# Patient Record
Sex: FEMALE | Race: WHITE | Employment: PART TIME | URBAN - METROPOLITAN AREA
[De-identification: names, ages, dates, MRNs, and addresses within clinical notes are randomized per-mention and may not be internally consistent; named-entity substitution may affect disease eponyms.]

---

## 2017-01-16 ENCOUNTER — OFFICE VISIT (OUTPATIENT)
Dept: FAMILY MEDICINE CLINIC | Age: 47
End: 2017-01-16

## 2017-01-16 VITALS
BODY MASS INDEX: 30.3 KG/M2 | DIASTOLIC BLOOD PRESSURE: 74 MMHG | WEIGHT: 171 LBS | OXYGEN SATURATION: 97 % | TEMPERATURE: 97.2 F | HEART RATE: 74 BPM | HEIGHT: 63 IN | SYSTOLIC BLOOD PRESSURE: 128 MMHG | RESPIRATION RATE: 18 BRPM

## 2017-01-16 DIAGNOSIS — F41.9 ANXIETY: ICD-10-CM

## 2017-01-16 DIAGNOSIS — G43.809 OTHER MIGRAINE WITHOUT STATUS MIGRAINOSUS, NOT INTRACTABLE: Primary | ICD-10-CM

## 2017-01-16 DIAGNOSIS — Z12.39 BREAST CANCER SCREENING, HIGH RISK PATIENT: ICD-10-CM

## 2017-01-16 DIAGNOSIS — G43.809 OTHER MIGRAINE WITHOUT STATUS MIGRAINOSUS, NOT INTRACTABLE: ICD-10-CM

## 2017-01-16 LAB
ALBUMIN SERPL-MCNC: 4.7 G/DL (ref 3.9–4.9)
ALP BLD-CCNC: 48 U/L (ref 40–130)
ALT SERPL-CCNC: 28 U/L (ref 0–33)
ANION GAP SERPL CALCULATED.3IONS-SCNC: 12 MEQ/L (ref 7–13)
AST SERPL-CCNC: 23 U/L (ref 0–35)
BASOPHILS ABSOLUTE: 0 K/UL (ref 0–0.2)
BASOPHILS RELATIVE PERCENT: 0.5 %
BILIRUB SERPL-MCNC: 0.3 MG/DL (ref 0–1.2)
BUN BLDV-MCNC: 14 MG/DL (ref 6–20)
CALCIUM SERPL-MCNC: 9.1 MG/DL (ref 8.6–10.2)
CHLORIDE BLD-SCNC: 102 MEQ/L (ref 98–107)
CHOLESTEROL, TOTAL: 191 MG/DL (ref 0–199)
CO2: 25 MEQ/L (ref 22–29)
CREAT SERPL-MCNC: 0.69 MG/DL (ref 0.5–0.9)
EOSINOPHILS ABSOLUTE: 0.2 K/UL (ref 0–0.7)
EOSINOPHILS RELATIVE PERCENT: 2.3 %
GFR AFRICAN AMERICAN: >60
GFR NON-AFRICAN AMERICAN: >60
GLOBULIN: 2.4 G/DL (ref 2.3–3.5)
GLUCOSE BLD-MCNC: 89 MG/DL (ref 74–109)
HCT VFR BLD CALC: 40.6 % (ref 37–47)
HDLC SERPL-MCNC: 77 MG/DL (ref 40–59)
HEMOGLOBIN: 13.5 G/DL (ref 12–16)
LDL CHOLESTEROL CALCULATED: 94 MG/DL (ref 0–129)
LYMPHOCYTES ABSOLUTE: 1.9 K/UL (ref 1–4.8)
LYMPHOCYTES RELATIVE PERCENT: 27.4 %
MCH RBC QN AUTO: 31 PG (ref 27–31.3)
MCHC RBC AUTO-ENTMCNC: 33.4 % (ref 33–37)
MCV RBC AUTO: 92.9 FL (ref 82–100)
MONOCYTES ABSOLUTE: 0.4 K/UL (ref 0.2–0.8)
MONOCYTES RELATIVE PERCENT: 6.4 %
NEUTROPHILS ABSOLUTE: 4.3 K/UL (ref 1.4–6.5)
NEUTROPHILS RELATIVE PERCENT: 63.4 %
PDW BLD-RTO: 13.3 % (ref 11.5–14.5)
PLATELET # BLD: 196 K/UL (ref 130–400)
POTASSIUM SERPL-SCNC: 4.2 MEQ/L (ref 3.5–5.1)
RBC # BLD: 4.37 M/UL (ref 4.2–5.4)
SODIUM BLD-SCNC: 139 MEQ/L (ref 132–144)
TOTAL PROTEIN: 7.1 G/DL (ref 6.4–8.1)
TRIGL SERPL-MCNC: 101 MG/DL (ref 0–200)
WBC # BLD: 6.8 K/UL (ref 4.8–10.8)

## 2017-01-16 PROCEDURE — G8427 DOCREV CUR MEDS BY ELIG CLIN: HCPCS | Performed by: FAMILY MEDICINE

## 2017-01-16 PROCEDURE — G8484 FLU IMMUNIZE NO ADMIN: HCPCS | Performed by: FAMILY MEDICINE

## 2017-01-16 PROCEDURE — 99213 OFFICE O/P EST LOW 20 MIN: CPT | Performed by: FAMILY MEDICINE

## 2017-01-16 PROCEDURE — G8419 CALC BMI OUT NRM PARAM NOF/U: HCPCS | Performed by: FAMILY MEDICINE

## 2017-01-16 PROCEDURE — 1036F TOBACCO NON-USER: CPT | Performed by: FAMILY MEDICINE

## 2017-01-16 RX ORDER — ACYCLOVIR 400 MG/1
400 TABLET ORAL
Qty: 50 TABLET | Refills: 0 | Status: CANCELLED | OUTPATIENT
Start: 2017-01-16 | End: 2017-01-26

## 2017-01-16 RX ORDER — ALPRAZOLAM 1 MG/1
1 TABLET ORAL 2 TIMES DAILY PRN
Qty: 60 TABLET | Refills: 2 | Status: SHIPPED | OUTPATIENT
Start: 2017-01-16 | End: 2017-06-14 | Stop reason: SDUPTHER

## 2017-01-16 RX ORDER — FLUTICASONE PROPIONATE 50 MCG
1 SPRAY, SUSPENSION (ML) NASAL DAILY
Qty: 1 BOTTLE | Refills: 5 | Status: SHIPPED | OUTPATIENT
Start: 2017-01-16

## 2017-01-16 RX ORDER — CEFDINIR 300 MG/1
300 CAPSULE ORAL 2 TIMES DAILY
Qty: 20 CAPSULE | Refills: 0 | Status: SHIPPED | OUTPATIENT
Start: 2017-01-16 | End: 2017-01-26

## 2017-01-16 RX ORDER — OXYCODONE HYDROCHLORIDE AND ACETAMINOPHEN 5; 325 MG/1; MG/1
1 TABLET ORAL EVERY 6 HOURS PRN
Qty: 30 TABLET | Refills: 0 | Status: SHIPPED | OUTPATIENT
Start: 2017-01-16 | End: 2017-03-20 | Stop reason: SDUPTHER

## 2017-01-16 ASSESSMENT — ENCOUNTER SYMPTOMS
RHINORRHEA: 0
COUGH: 0
EYES NEGATIVE: 1
CHEST TIGHTNESS: 0
RESPIRATORY NEGATIVE: 1
GASTROINTESTINAL NEGATIVE: 1

## 2017-01-17 RX ORDER — DEXLANSOPRAZOLE 60 MG/1
CAPSULE, DELAYED RELEASE ORAL
Qty: 30 CAPSULE | Refills: 5 | Status: CANCELLED | OUTPATIENT
Start: 2017-01-17

## 2017-01-17 RX ORDER — LANSOPRAZOLE 30 MG/1
30 CAPSULE, DELAYED RELEASE ORAL DAILY
Qty: 90 CAPSULE | Refills: 3 | Status: SHIPPED | OUTPATIENT
Start: 2017-01-17 | End: 2018-02-27

## 2017-01-20 ENCOUNTER — TELEPHONE (OUTPATIENT)
Dept: FAMILY MEDICINE CLINIC | Age: 47
End: 2017-01-20

## 2017-01-20 RX ORDER — VALACYCLOVIR HYDROCHLORIDE 1 G/1
1000 TABLET, FILM COATED ORAL 3 TIMES DAILY
Qty: 21 TABLET | Refills: 3 | Status: SHIPPED | OUTPATIENT
Start: 2017-01-20 | End: 2017-11-30 | Stop reason: SDUPTHER

## 2017-01-20 RX ORDER — DEXLANSOPRAZOLE 60 MG/1
60 CAPSULE, DELAYED RELEASE ORAL DAILY
Qty: 30 CAPSULE | Refills: 5 | Status: SHIPPED | OUTPATIENT
Start: 2017-01-20 | End: 2018-06-01 | Stop reason: SDUPTHER

## 2017-01-30 ENCOUNTER — HOSPITAL ENCOUNTER (OUTPATIENT)
Dept: WOMENS IMAGING | Age: 47
Discharge: HOME | End: 2017-01-30
Payer: COMMERCIAL

## 2017-01-30 DIAGNOSIS — Z12.39 BREAST CANCER SCREENING, HIGH RISK PATIENT: ICD-10-CM

## 2017-01-30 PROCEDURE — G0202 SCR MAMMO BI INCL CAD: HCPCS

## 2017-03-20 RX ORDER — OXYCODONE HYDROCHLORIDE AND ACETAMINOPHEN 5; 325 MG/1; MG/1
1 TABLET ORAL EVERY 6 HOURS PRN
Qty: 30 TABLET | Refills: 0 | Status: SHIPPED | OUTPATIENT
Start: 2017-03-20 | End: 2017-06-14 | Stop reason: SDUPTHER

## 2017-03-27 ENCOUNTER — TELEPHONE (OUTPATIENT)
Dept: OBGYN | Age: 47
End: 2017-03-27

## 2017-03-27 DIAGNOSIS — N83.201 RIGHT OVARIAN CYST: Primary | ICD-10-CM

## 2017-03-28 ENCOUNTER — TELEPHONE (OUTPATIENT)
Dept: OBGYN | Age: 47
End: 2017-03-28

## 2017-04-03 ENCOUNTER — HOSPITAL ENCOUNTER (OUTPATIENT)
Dept: ULTRASOUND IMAGING | Age: 47
Discharge: HOME | End: 2017-04-03
Payer: COMMERCIAL

## 2017-04-03 DIAGNOSIS — N83.201 RIGHT OVARIAN CYST: ICD-10-CM

## 2017-04-03 PROCEDURE — 76830 TRANSVAGINAL US NON-OB: CPT

## 2017-04-03 PROCEDURE — 76856 US EXAM PELVIC COMPLETE: CPT

## 2017-04-27 ENCOUNTER — OFFICE VISIT (OUTPATIENT)
Dept: OBGYN | Age: 47
End: 2017-04-27

## 2017-04-27 VITALS
HEIGHT: 63 IN | DIASTOLIC BLOOD PRESSURE: 76 MMHG | SYSTOLIC BLOOD PRESSURE: 118 MMHG | BODY MASS INDEX: 29.59 KG/M2 | WEIGHT: 167 LBS

## 2017-04-27 DIAGNOSIS — Z11.51 SPECIAL SCREENING EXAMINATION FOR HUMAN PAPILLOMAVIRUS (HPV): ICD-10-CM

## 2017-04-27 DIAGNOSIS — Z01.419 NORMAL GYNECOLOGIC EXAMINATION: Primary | ICD-10-CM

## 2017-04-27 DIAGNOSIS — Z12.31 SCREENING MAMMOGRAM, ENCOUNTER FOR: ICD-10-CM

## 2017-04-27 PROCEDURE — G8419 CALC BMI OUT NRM PARAM NOF/U: HCPCS | Performed by: OBSTETRICS & GYNECOLOGY

## 2017-04-27 PROCEDURE — 1036F TOBACCO NON-USER: CPT | Performed by: OBSTETRICS & GYNECOLOGY

## 2017-04-27 PROCEDURE — G8427 DOCREV CUR MEDS BY ELIG CLIN: HCPCS | Performed by: OBSTETRICS & GYNECOLOGY

## 2017-04-27 PROCEDURE — 99396 PREV VISIT EST AGE 40-64: CPT | Performed by: OBSTETRICS & GYNECOLOGY

## 2017-05-10 DIAGNOSIS — Z01.419 NORMAL GYNECOLOGIC EXAMINATION: ICD-10-CM

## 2017-05-10 DIAGNOSIS — Z11.51 SPECIAL SCREENING EXAMINATION FOR HUMAN PAPILLOMAVIRUS (HPV): ICD-10-CM

## 2017-05-12 ENCOUNTER — TELEPHONE (OUTPATIENT)
Dept: OBGYN | Age: 47
End: 2017-05-12

## 2017-05-16 DIAGNOSIS — F41.9 ANXIETY: ICD-10-CM

## 2017-05-16 RX ORDER — OXYCODONE HYDROCHLORIDE AND ACETAMINOPHEN 5; 325 MG/1; MG/1
1 TABLET ORAL EVERY 6 HOURS PRN
Qty: 30 TABLET | Refills: 0 | Status: CANCELLED | OUTPATIENT
Start: 2017-05-16

## 2017-05-16 RX ORDER — ALPRAZOLAM 1 MG/1
1 TABLET ORAL 2 TIMES DAILY PRN
Qty: 60 TABLET | Refills: 2 | Status: CANCELLED | OUTPATIENT
Start: 2017-05-16

## 2017-06-14 ENCOUNTER — OFFICE VISIT (OUTPATIENT)
Dept: FAMILY MEDICINE CLINIC | Age: 47
End: 2017-06-14

## 2017-06-14 VITALS
BODY MASS INDEX: 30.65 KG/M2 | WEIGHT: 173 LBS | RESPIRATION RATE: 18 BRPM | HEIGHT: 63 IN | SYSTOLIC BLOOD PRESSURE: 118 MMHG | DIASTOLIC BLOOD PRESSURE: 64 MMHG | TEMPERATURE: 97.2 F | HEART RATE: 74 BPM | OXYGEN SATURATION: 97 %

## 2017-06-14 DIAGNOSIS — G43.809 OTHER TYPE OF MIGRAINE: Primary | ICD-10-CM

## 2017-06-14 DIAGNOSIS — F41.9 ANXIETY: ICD-10-CM

## 2017-06-14 PROCEDURE — 99213 OFFICE O/P EST LOW 20 MIN: CPT | Performed by: FAMILY MEDICINE

## 2017-06-14 PROCEDURE — G8427 DOCREV CUR MEDS BY ELIG CLIN: HCPCS | Performed by: FAMILY MEDICINE

## 2017-06-14 PROCEDURE — G8419 CALC BMI OUT NRM PARAM NOF/U: HCPCS | Performed by: FAMILY MEDICINE

## 2017-06-14 PROCEDURE — 1036F TOBACCO NON-USER: CPT | Performed by: FAMILY MEDICINE

## 2017-06-14 RX ORDER — CITALOPRAM 20 MG/1
20 TABLET ORAL DAILY
Qty: 30 TABLET | Refills: 3 | Status: SHIPPED | OUTPATIENT
Start: 2017-06-14 | End: 2017-09-05

## 2017-06-14 RX ORDER — ALPRAZOLAM 1 MG/1
1 TABLET ORAL 2 TIMES DAILY PRN
Qty: 60 TABLET | Refills: 2 | Status: SHIPPED | OUTPATIENT
Start: 2017-06-14 | End: 2017-10-31 | Stop reason: SDUPTHER

## 2017-06-14 RX ORDER — OXYCODONE HYDROCHLORIDE AND ACETAMINOPHEN 5; 325 MG/1; MG/1
1 TABLET ORAL EVERY 6 HOURS PRN
Qty: 30 TABLET | Refills: 0 | Status: SHIPPED | OUTPATIENT
Start: 2017-06-14 | End: 2017-09-05 | Stop reason: SDUPTHER

## 2017-06-14 ASSESSMENT — ENCOUNTER SYMPTOMS
EYES NEGATIVE: 1
RHINORRHEA: 0
CHEST TIGHTNESS: 0
GASTROINTESTINAL NEGATIVE: 1
RESPIRATORY NEGATIVE: 1
COUGH: 0

## 2017-07-12 RX ORDER — VALACYCLOVIR HYDROCHLORIDE 1 G/1
1000 TABLET, FILM COATED ORAL 3 TIMES DAILY
Qty: 21 TABLET | Refills: 1 | Status: SHIPPED | OUTPATIENT
Start: 2017-07-12 | End: 2017-07-19

## 2017-09-05 ENCOUNTER — TELEPHONE (OUTPATIENT)
Dept: FAMILY MEDICINE CLINIC | Age: 47
End: 2017-09-05

## 2017-09-05 ENCOUNTER — OFFICE VISIT (OUTPATIENT)
Dept: FAMILY MEDICINE CLINIC | Age: 47
End: 2017-09-05

## 2017-09-05 VITALS
DIASTOLIC BLOOD PRESSURE: 84 MMHG | HEART RATE: 74 BPM | OXYGEN SATURATION: 98 % | WEIGHT: 175 LBS | HEIGHT: 63 IN | RESPIRATION RATE: 18 BRPM | SYSTOLIC BLOOD PRESSURE: 130 MMHG | TEMPERATURE: 98.1 F | BODY MASS INDEX: 31.01 KG/M2

## 2017-09-05 DIAGNOSIS — G43.009 NONINTRACTABLE MIGRAINE, UNSPECIFIED MIGRAINE TYPE: ICD-10-CM

## 2017-09-05 DIAGNOSIS — F32.A DEPRESSION, UNSPECIFIED DEPRESSION TYPE: ICD-10-CM

## 2017-09-05 DIAGNOSIS — F41.9 ANXIETY: Primary | ICD-10-CM

## 2017-09-05 PROCEDURE — 99213 OFFICE O/P EST LOW 20 MIN: CPT | Performed by: FAMILY MEDICINE

## 2017-09-05 RX ORDER — BUPROPION HYDROCHLORIDE 150 MG/1
150 TABLET ORAL EVERY MORNING
Qty: 30 TABLET | Refills: 3 | Status: SHIPPED | OUTPATIENT
Start: 2017-09-05 | End: 2017-09-05 | Stop reason: SDUPTHER

## 2017-09-05 RX ORDER — BUPROPION HYDROCHLORIDE 150 MG/1
150 TABLET ORAL EVERY MORNING
Qty: 30 TABLET | Refills: 3 | Status: SHIPPED | OUTPATIENT
Start: 2017-09-05 | End: 2017-10-31 | Stop reason: SDUPTHER

## 2017-09-05 RX ORDER — CELECOXIB 200 MG/1
200 CAPSULE ORAL DAILY
Qty: 60 CAPSULE | Refills: 3 | Status: SHIPPED | OUTPATIENT
Start: 2017-09-05 | End: 2018-02-27

## 2017-09-05 RX ORDER — OXYCODONE HYDROCHLORIDE AND ACETAMINOPHEN 5; 325 MG/1; MG/1
TABLET ORAL
Qty: 28 TABLET | Refills: 0 | Status: SHIPPED | OUTPATIENT
Start: 2017-09-05 | End: 2017-10-31 | Stop reason: SDUPTHER

## 2017-09-05 RX ORDER — CELECOXIB 200 MG/1
200 CAPSULE ORAL DAILY
Qty: 60 CAPSULE | Refills: 3 | Status: SHIPPED | OUTPATIENT
Start: 2017-09-05 | End: 2017-09-05 | Stop reason: SDUPTHER

## 2017-09-05 ASSESSMENT — ENCOUNTER SYMPTOMS
EYES NEGATIVE: 1
RESPIRATORY NEGATIVE: 1
GASTROINTESTINAL NEGATIVE: 1
COUGH: 0
RHINORRHEA: 0
CHEST TIGHTNESS: 0

## 2017-10-31 ENCOUNTER — OFFICE VISIT (OUTPATIENT)
Dept: FAMILY MEDICINE CLINIC | Age: 47
End: 2017-10-31

## 2017-10-31 VITALS
RESPIRATION RATE: 18 BRPM | BODY MASS INDEX: 27.94 KG/M2 | DIASTOLIC BLOOD PRESSURE: 80 MMHG | OXYGEN SATURATION: 97 % | TEMPERATURE: 97.8 F | HEIGHT: 67 IN | HEART RATE: 74 BPM | WEIGHT: 178 LBS | SYSTOLIC BLOOD PRESSURE: 132 MMHG

## 2017-10-31 DIAGNOSIS — G43.809 OTHER MIGRAINE WITHOUT STATUS MIGRAINOSUS, NOT INTRACTABLE: Primary | ICD-10-CM

## 2017-10-31 DIAGNOSIS — B07.9 VIRAL WARTS, UNSPECIFIED TYPE: ICD-10-CM

## 2017-10-31 DIAGNOSIS — F41.9 ANXIETY: ICD-10-CM

## 2017-10-31 PROBLEM — G43.909 MIGRAINE: Status: ACTIVE | Noted: 2017-10-31

## 2017-10-31 PROCEDURE — 17110 DESTRUCTION B9 LES UP TO 14: CPT | Performed by: FAMILY MEDICINE

## 2017-10-31 PROCEDURE — 99213 OFFICE O/P EST LOW 20 MIN: CPT | Performed by: FAMILY MEDICINE

## 2017-10-31 RX ORDER — OXYCODONE HYDROCHLORIDE AND ACETAMINOPHEN 5; 325 MG/1; MG/1
TABLET ORAL
Qty: 28 TABLET | Refills: 0 | Status: SHIPPED | OUTPATIENT
Start: 2017-10-31 | End: 2018-03-07 | Stop reason: SDUPTHER

## 2017-10-31 RX ORDER — ALPRAZOLAM 1 MG/1
1 TABLET ORAL 2 TIMES DAILY PRN
Qty: 60 TABLET | Refills: 2 | Status: SHIPPED | OUTPATIENT
Start: 2017-10-31 | End: 2018-03-07 | Stop reason: SDUPTHER

## 2017-10-31 RX ORDER — BUPROPION HYDROCHLORIDE 300 MG/1
300 TABLET ORAL EVERY MORNING
Qty: 90 TABLET | Refills: 3 | Status: SHIPPED | OUTPATIENT
Start: 2017-10-31 | End: 2018-03-07 | Stop reason: SDUPTHER

## 2017-10-31 ASSESSMENT — ENCOUNTER SYMPTOMS
CHEST TIGHTNESS: 0
EYES NEGATIVE: 1
COUGH: 0
RESPIRATORY NEGATIVE: 1
GASTROINTESTINAL NEGATIVE: 1
RHINORRHEA: 0

## 2017-10-31 NOTE — PROGRESS NOTES
Patient is seen in follow up for   Chief Complaint   Patient presents with    Medication Check     3 mon CSM for xanax & percocet     Skin Lesion     finger rt hand      HPIhere for wart on her finger for a year needs treated and for refills on percocet for headaches one every three days and takes xanax at night for anxiety and sleep. Failed topamax and immitrex. Past Medical History:   Diagnosis Date    Allergic rhinitis     Anxiety     Depression     GERD (gastroesophageal reflux disease)     Herpes simplex virus type 1 (HSV-1) dermatitis     oral only. Patient Active Problem List    Diagnosis Date Noted    Migraine 10/31/2017    Breast mass 07/06/2015    Anxiety     Allergic rhinitis     GERD (gastroesophageal reflux disease)     Depression      Past Surgical History:   Procedure Laterality Date    BREAST LUMPECTOMY Left 12/2014    DILATION AND CURETT[facility]US  09/21/2016     [facility]    TONSILLECTOMY AND ADENOIDECTOMY      UPPER GASTROINTESTINAL ENDOSCOPY  9/9/08    DR Breonna Lozoya    UPPER GASTROINTESTINAL ENDOSCOPY  8/27/10    DR CUMMINS     No family history on file. Social History     Social History    Marital status:      Spouse name: N/A    Number of children: N/A    Years of education: N/A     Social History Main Topics    Smoking status: Former Smoker    Smokeless tobacco: Never Used    Alcohol use 0.0 oz/week      Comment: occ    Drug use: No    Sexual activity: Yes     Partners: Male     Other Topics Concern    None     Social History Narrative    None     Current Outpatient Prescriptions   Medication Sig Dispense Refill    oxyCODONE-acetaminophen (PERCOCET) 5-325 MG per tablet Take one as needed for migraine. . 28 tablet 0    buPROPion (WELLBUTRIN XL) 300 MG extended release tablet Take 1 tablet by mouth every morning 90 tablet 3    ALPRAZolam (XANAX) 1 MG tablet Take 1 tablet by mouth 2 times daily as needed for Sleep or Anxiety 60 tablet 2    celecoxib (CELEBREX) 200 MG capsule Take 1 capsule by mouth daily 60 capsule 3    dexlansoprazole (DEXILANT) 60 MG CPDR delayed release capsule Take 1 capsule by mouth daily 30 capsule 5    lansoprazole (PREVACID) 30 MG delayed release capsule Take 1 capsule by mouth daily 90 capsule 3    fluticasone (FLONASE) 50 MCG/ACT nasal spray 1 spray by Nasal route daily 1 Bottle 5     No current facility-administered medications for this visit. Current Outpatient Prescriptions on File Prior to Visit   Medication Sig Dispense Refill    celecoxib (CELEBREX) 200 MG capsule Take 1 capsule by mouth daily 60 capsule 3    dexlansoprazole (DEXILANT) 60 MG CPDR delayed release capsule Take 1 capsule by mouth daily 30 capsule 5    lansoprazole (PREVACID) 30 MG delayed release capsule Take 1 capsule by mouth daily 90 capsule 3    fluticasone (FLONASE) 50 MCG/ACT nasal spray 1 spray by Nasal route daily 1 Bottle 5     No current facility-administered medications on file prior to visit. Allergies   Allergen Reactions    Doxycycline Other (See Comments)     Heart burn    Prochlorperazine Edisylate      ANXIETY     Health Maintenance   Topic Date Due    HIV screen  02/14/1987    DTaP/Tdap/Td vaccine (1 - Tdap) 02/14/1991    Flu vaccine (1) 09/01/2017    Cervical cancer screen  05/04/2020    Lipid screen  01/16/2022       Review of Systems    Review of Systems   Constitutional: Negative for activity change, appetite change, fatigue and fever. HENT: Negative for congestion and rhinorrhea. Eyes: Negative. Respiratory: Negative. Negative for cough and chest tightness. Cardiovascular: Negative. Gastrointestinal: Negative. Endocrine: Negative. Genitourinary: Negative. Musculoskeletal: Negative. Skin: Negative. Neurological: Positive for headaches. Negative for dizziness, light-headedness and numbness. Hematological: Negative. Psychiatric/Behavioral: Negative.         Physical Exam  Vitals:

## 2017-11-30 ENCOUNTER — TELEPHONE (OUTPATIENT)
Dept: FAMILY MEDICINE CLINIC | Age: 47
End: 2017-11-30

## 2017-11-30 RX ORDER — VALACYCLOVIR HYDROCHLORIDE 1 G/1
1000 TABLET, FILM COATED ORAL 3 TIMES DAILY
Qty: 21 TABLET | Refills: 3 | Status: SHIPPED | OUTPATIENT
Start: 2017-11-30 | End: 2017-12-07

## 2017-11-30 RX ORDER — VALACYCLOVIR HYDROCHLORIDE 1 G/1
1000 TABLET, FILM COATED ORAL 3 TIMES DAILY
Qty: 21 TABLET | Refills: 3 | Status: SHIPPED | OUTPATIENT
Start: 2017-11-30 | End: 2017-11-30 | Stop reason: SDUPTHER

## 2018-02-12 ENCOUNTER — TELEPHONE (OUTPATIENT)
Dept: FAMILY MEDICINE CLINIC | Age: 48
End: 2018-02-12

## 2018-02-12 DIAGNOSIS — Z12.39 BREAST CANCER SCREENING, HIGH RISK PATIENT: Primary | ICD-10-CM

## 2018-02-21 ENCOUNTER — HOSPITAL ENCOUNTER (OUTPATIENT)
Dept: WOMENS IMAGING | Age: 48
Discharge: HOME | End: 2018-02-23
Payer: COMMERCIAL

## 2018-02-21 DIAGNOSIS — Z12.39 BREAST CANCER SCREENING, HIGH RISK PATIENT: ICD-10-CM

## 2018-02-21 PROCEDURE — 77067 SCR MAMMO BI INCL CAD: CPT

## 2018-02-27 ENCOUNTER — OFFICE VISIT (OUTPATIENT)
Dept: OBGYN CLINIC | Age: 48
End: 2018-02-27
Payer: COMMERCIAL

## 2018-02-27 VITALS
HEIGHT: 63 IN | WEIGHT: 168 LBS | DIASTOLIC BLOOD PRESSURE: 82 MMHG | BODY MASS INDEX: 29.77 KG/M2 | SYSTOLIC BLOOD PRESSURE: 124 MMHG

## 2018-02-27 DIAGNOSIS — R92.2 DENSE BREAST: ICD-10-CM

## 2018-02-27 DIAGNOSIS — Z01.419 PAP SMEAR, AS PART OF ROUTINE GYNECOLOGICAL EXAMINATION: Primary | ICD-10-CM

## 2018-02-27 DIAGNOSIS — Z12.31 SCREENING MAMMOGRAM, ENCOUNTER FOR: ICD-10-CM

## 2018-02-27 DIAGNOSIS — Z11.51 SCREENING FOR HUMAN PAPILLOMAVIRUS: ICD-10-CM

## 2018-02-27 DIAGNOSIS — N89.8 VAGINAL DISCHARGE: ICD-10-CM

## 2018-02-27 PROCEDURE — 99396 PREV VISIT EST AGE 40-64: CPT | Performed by: OBSTETRICS & GYNECOLOGY

## 2018-02-27 PROCEDURE — 99213 OFFICE O/P EST LOW 20 MIN: CPT | Performed by: OBSTETRICS & GYNECOLOGY

## 2018-02-27 RX ORDER — VALACYCLOVIR HYDROCHLORIDE 1 G/1
1000 TABLET, FILM COATED ORAL 2 TIMES DAILY
COMMUNITY
End: 2018-12-26 | Stop reason: SDUPTHER

## 2018-02-27 ASSESSMENT — PATIENT HEALTH QUESTIONNAIRE - PHQ9
SUM OF ALL RESPONSES TO PHQ9 QUESTIONS 1 & 2: 1
2. FEELING DOWN, DEPRESSED OR HOPELESS: 1
SUM OF ALL RESPONSES TO PHQ QUESTIONS 1-9: 1
1. LITTLE INTEREST OR PLEASURE IN DOING THINGS: 0

## 2018-02-27 ASSESSMENT — ENCOUNTER SYMPTOMS
BLOOD IN STOOL: 0
EYES NEGATIVE: 1
ABDOMINAL DISTENTION: 0
VOMITING: 0
NAUSEA: 0
CONSTIPATION: 0
ABDOMINAL PAIN: 0
RECTAL PAIN: 0
ALLERGIC/IMMUNOLOGIC NEGATIVE: 1
RESPIRATORY NEGATIVE: 1
ANAL BLEEDING: 0
DIARRHEA: 0

## 2018-02-27 NOTE — PROGRESS NOTES
Subjective:      Patient ID: Sandra Spann is a 57 y.o. female    Annual exam.  No cycles s/p ablation. Pap performed and screening mammogram ordered. STD screening offered. Monthly SBE encouraged. F/U annual or prn. Pt also wished to discuss mid-cycle vaginal discharge and dense breast tissue today at her visit extending her appointment time by 15 minutes. Patient states she gets clear vaginal discharge once a month, occasionally irritating. No bleeding s/p ablation, but c/w mid-cycle ovulatory discharge by hx. Reassured WNL. Also discussed dense breast tissue, mammograms, how mammograms are generally read by comparison and SBE. All questions answered. F/U annual exam or prn. Vitals:  /82   Ht 5' 3\" (1.6 m)   Wt 168 lb (76.2 kg)   BMI 29.76 kg/m²   Past Medical History:   Diagnosis Date    Allergic rhinitis     Anxiety     Depression     GERD (gastroesophageal reflux disease)     Herpes simplex virus type 1 (HSV-1) dermatitis     oral only. Past Surgical History:   Procedure Laterality Date    BREAST LUMPECTOMY Left 12/2014    [facility]21/2016     [facility]    TONSILLECTOMY AND ADENOIDECTOMY      UPPER GASTROINTESTINAL ENDOSCOPY  9/9/08    DR Jo Alba    UPPER GASTROINTESTINAL ENDOSCOPY  8/27/10    DR Jo Alba     Allergies:  Doxycycline and Prochlorperazine edisylate  Current Outpatient Prescriptions   Medication Sig Dispense Refill    valACYclovir (VALTREX) 1 g tablet Take 1,000 mg by mouth 2 times daily      oxyCODONE-acetaminophen (PERCOCET) 5-325 MG per tablet Take one as needed for migraine. . 28 tablet 0    buPROPion (WELLBUTRIN XL) 300 MG extended release tablet Take 1 tablet by mouth every morning 90 tablet 3    ALPRAZolam (XANAX) 1 MG tablet Take 1 tablet by mouth 2 times daily as needed for Sleep or Anxiety 60 tablet 2    dexlansoprazole (DEXILANT) 60 MG CPDR delayed release capsule Take 1 capsule by mouth daily 30 capsule 5    fluticasone (FLONASE) 50 MCG/ACT nasal spray 1 spray by Nasal route daily 1 Bottle 5     No current facility-administered medications for this visit. Social History     Social History    Marital status:      Spouse name: N/A    Number of children: N/A    Years of education: N/A     Occupational History    Not on file. Social History Main Topics    Smoking status: Former Smoker    Smokeless tobacco: Never Used    Alcohol use 0.0 oz/week      Comment: occ    Drug use: No    Sexual activity: Yes     Partners: Male     Other Topics Concern    Not on file     Social History Narrative    No narrative on file     Family History   Problem Relation Age of Onset    No Known Problems Mother     No Known Problems Father     No Known Problems Paternal Grandfather     No Known Problems Paternal Grandmother     No Known Problems Maternal Grandmother     No Known Problems Maternal Grandfather     No Known Problems Brother     No Known Problems Sister     No Known Problems Other     Breast Cancer Neg Hx     Cancer Neg Hx     Colon Cancer Neg Hx     Diabetes Neg Hx     Eclampsia Neg Hx     Hypertension Neg Hx     Ovarian Cancer Neg Hx      Labor Neg Hx     Spont Abortions Neg Hx     Stroke Neg Hx        Review of Systems   Constitutional: Negative. Negative for activity change, appetite change, chills, diaphoresis, fatigue, fever and unexpected weight change. HENT: Negative. Eyes: Negative. Respiratory: Negative. Cardiovascular: Negative. Gastrointestinal: Negative for abdominal distention, abdominal pain, anal bleeding, blood in stool, constipation, diarrhea, nausea, rectal pain and vomiting. Endocrine: Negative. Genitourinary: Positive for vaginal discharge (Mid-cycle clear).  Negative for decreased urine volume, difficulty urinating, dyspareunia, dysuria, enuresis, flank pain, frequency, genital sores, hematuria, menstrual problem, pelvic pain, urgency, vaginal bleeding and vaginal pain. Musculoskeletal: Negative. Skin: Negative. Allergic/Immunologic: Negative. Neurological: Negative. Hematological: Negative. Psychiatric/Behavioral: Negative. Objective:     Physical Exam   Constitutional: She is oriented to person, place, and time. She appears well-developed and well-nourished. HENT:   Head: Normocephalic. Neck: Normal range of motion. Neck supple. No thyromegaly present. Cardiovascular: Normal rate, regular rhythm and normal heart sounds. Pulmonary/Chest: Effort normal and breath sounds normal. No respiratory distress. She has no wheezes. She has no rales. She exhibits no tenderness. Abdominal: Soft. Bowel sounds are normal. She exhibits no distension and no mass. There is no tenderness. There is no rebound and no guarding. Hernia confirmed negative in the right inguinal area and confirmed negative in the left inguinal area. Genitourinary: Rectum normal, vagina normal and uterus normal. No breast swelling, tenderness, discharge or bleeding. No labial fusion. There is no rash, tenderness, lesion or injury on the right labia. There is no rash, tenderness, lesion or injury on the left labia. Uterus is not deviated, not enlarged, not fixed and not tender. Cervix exhibits no motion tenderness, no discharge and no friability. Right adnexum displays no mass, no tenderness and no fullness. Left adnexum displays no mass, no tenderness and no fullness. No erythema, tenderness or bleeding in the vagina. No foreign body in the vagina. No signs of injury around the vagina. No vaginal discharge found. Musculoskeletal: Normal range of motion. She exhibits no edema or tenderness. Lymphadenopathy:     She has no cervical adenopathy. Right: No inguinal adenopathy present. Left: No inguinal adenopathy present. Neurological: She is alert and oriented to person, place, and time. Skin: Skin is warm and dry. No rash noted.  No

## 2018-03-05 DIAGNOSIS — Z11.51 SCREENING FOR HUMAN PAPILLOMAVIRUS: ICD-10-CM

## 2018-03-05 DIAGNOSIS — Z01.419 PAP SMEAR, AS PART OF ROUTINE GYNECOLOGICAL EXAMINATION: ICD-10-CM

## 2018-03-07 ENCOUNTER — OFFICE VISIT (OUTPATIENT)
Dept: FAMILY MEDICINE CLINIC | Age: 48
End: 2018-03-07
Payer: COMMERCIAL

## 2018-03-07 VITALS
HEIGHT: 67 IN | OXYGEN SATURATION: 97 % | BODY MASS INDEX: 26.37 KG/M2 | HEART RATE: 74 BPM | DIASTOLIC BLOOD PRESSURE: 78 MMHG | WEIGHT: 168 LBS | TEMPERATURE: 98.1 F | RESPIRATION RATE: 18 BRPM | SYSTOLIC BLOOD PRESSURE: 128 MMHG

## 2018-03-07 DIAGNOSIS — Z00.00 PHYSICAL EXAM, ANNUAL: Primary | ICD-10-CM

## 2018-03-07 DIAGNOSIS — B96.89 ACUTE BACTERIAL SINUSITIS: ICD-10-CM

## 2018-03-07 DIAGNOSIS — F41.9 ANXIETY: ICD-10-CM

## 2018-03-07 DIAGNOSIS — Z00.00 PHYSICAL EXAM, ANNUAL: ICD-10-CM

## 2018-03-07 DIAGNOSIS — G43.809 OTHER MIGRAINE WITHOUT STATUS MIGRAINOSUS, NOT INTRACTABLE: ICD-10-CM

## 2018-03-07 DIAGNOSIS — J01.90 ACUTE BACTERIAL SINUSITIS: ICD-10-CM

## 2018-03-07 LAB
ALBUMIN SERPL-MCNC: 4.5 G/DL (ref 3.9–4.9)
ALP BLD-CCNC: 64 U/L (ref 40–130)
ALT SERPL-CCNC: 14 U/L (ref 0–33)
ANION GAP SERPL CALCULATED.3IONS-SCNC: 13 MEQ/L (ref 7–13)
AST SERPL-CCNC: 16 U/L (ref 0–35)
BASOPHILS ABSOLUTE: 0 K/UL (ref 0–0.2)
BASOPHILS RELATIVE PERCENT: 0.5 %
BILIRUB SERPL-MCNC: 0.4 MG/DL (ref 0–1.2)
BUN BLDV-MCNC: 16 MG/DL (ref 6–20)
CALCIUM SERPL-MCNC: 9.3 MG/DL (ref 8.6–10.2)
CHLORIDE BLD-SCNC: 99 MEQ/L (ref 98–107)
CHOLESTEROL, TOTAL: 178 MG/DL (ref 0–199)
CO2: 27 MEQ/L (ref 22–29)
CREAT SERPL-MCNC: 0.71 MG/DL (ref 0.5–0.9)
EOSINOPHILS ABSOLUTE: 0.1 K/UL (ref 0–0.7)
EOSINOPHILS RELATIVE PERCENT: 2.1 %
GFR AFRICAN AMERICAN: >60
GFR NON-AFRICAN AMERICAN: >60
GLOBULIN: 2.8 G/DL (ref 2.3–3.5)
GLUCOSE BLD-MCNC: 92 MG/DL (ref 74–109)
HCT VFR BLD CALC: 42.5 % (ref 37–47)
HDLC SERPL-MCNC: 64 MG/DL (ref 40–59)
HEMOGLOBIN: 14.1 G/DL (ref 12–16)
LDL CHOLESTEROL CALCULATED: 102 MG/DL (ref 0–129)
LYMPHOCYTES ABSOLUTE: 1.6 K/UL (ref 1–4.8)
LYMPHOCYTES RELATIVE PERCENT: 24.2 %
MCH RBC QN AUTO: 31.4 PG (ref 27–31.3)
MCHC RBC AUTO-ENTMCNC: 33.3 % (ref 33–37)
MCV RBC AUTO: 94.4 FL (ref 82–100)
MONOCYTES ABSOLUTE: 0.5 K/UL (ref 0.2–0.8)
MONOCYTES RELATIVE PERCENT: 7 %
NEUTROPHILS ABSOLUTE: 4.3 K/UL (ref 1.4–6.5)
NEUTROPHILS RELATIVE PERCENT: 66.2 %
PDW BLD-RTO: 13.8 % (ref 11.5–14.5)
PLATELET # BLD: 261 K/UL (ref 130–400)
POTASSIUM SERPL-SCNC: 4.4 MEQ/L (ref 3.5–5.1)
RBC # BLD: 4.5 M/UL (ref 4.2–5.4)
SODIUM BLD-SCNC: 139 MEQ/L (ref 132–144)
TOTAL PROTEIN: 7.3 G/DL (ref 6.4–8.1)
TRIGL SERPL-MCNC: 58 MG/DL (ref 0–200)
WBC # BLD: 6.5 K/UL (ref 4.8–10.8)

## 2018-03-07 PROCEDURE — 99396 PREV VISIT EST AGE 40-64: CPT | Performed by: FAMILY MEDICINE

## 2018-03-07 RX ORDER — ALPRAZOLAM 1 MG/1
1 TABLET ORAL 2 TIMES DAILY PRN
Qty: 60 TABLET | Refills: 2 | Status: SHIPPED | OUTPATIENT
Start: 2018-03-07 | End: 2018-10-15 | Stop reason: SDUPTHER

## 2018-03-07 RX ORDER — CEFDINIR 300 MG/1
300 CAPSULE ORAL 2 TIMES DAILY
Qty: 20 CAPSULE | Refills: 0 | Status: SHIPPED | OUTPATIENT
Start: 2018-03-07 | End: 2018-03-07 | Stop reason: CLARIF

## 2018-03-07 RX ORDER — BUPROPION HYDROCHLORIDE 300 MG/1
300 TABLET ORAL EVERY MORNING
Qty: 90 TABLET | Refills: 3 | Status: SHIPPED | OUTPATIENT
Start: 2018-03-07 | End: 2019-04-28 | Stop reason: SDUPTHER

## 2018-03-07 RX ORDER — OXYCODONE HYDROCHLORIDE AND ACETAMINOPHEN 5; 325 MG/1; MG/1
TABLET ORAL
Qty: 28 TABLET | Refills: 0 | Status: SHIPPED | OUTPATIENT
Start: 2018-03-07 | End: 2018-07-18 | Stop reason: SDUPTHER

## 2018-03-07 RX ORDER — CEFDINIR 300 MG/1
300 CAPSULE ORAL 2 TIMES DAILY
Qty: 20 CAPSULE | Refills: 0 | Status: SHIPPED | OUTPATIENT
Start: 2018-03-07 | End: 2018-03-17

## 2018-03-07 ASSESSMENT — ENCOUNTER SYMPTOMS
CHEST TIGHTNESS: 0
RHINORRHEA: 0
GASTROINTESTINAL NEGATIVE: 1
COUGH: 1
EYES NEGATIVE: 1
SINUS PRESSURE: 1
SORE THROAT: 1

## 2018-03-07 NOTE — PROGRESS NOTES
Used    Alcohol use 0.0 oz/week      Comment: occ    Drug use: No    Sexual activity: Yes     Partners: Male     Other Topics Concern    None     Social History Narrative    None     Current Outpatient Prescriptions   Medication Sig Dispense Refill    cefdinir (OMNICEF) 300 MG capsule Take 1 capsule by mouth 2 times daily for 10 days 20 capsule 0    ALPRAZolam (XANAX) 1 MG tablet Take 1 tablet by mouth 2 times daily as needed for Sleep or Anxiety for up to 30 days. 60 tablet 2    buPROPion (WELLBUTRIN XL) 300 MG extended release tablet Take 1 tablet by mouth every morning 90 tablet 3    oxyCODONE-acetaminophen (PERCOCET) 5-325 MG per tablet Take one as needed for migraine. . 28 tablet 0    valACYclovir (VALTREX) 1 g tablet Take 1,000 mg by mouth 2 times daily      dexlansoprazole (DEXILANT) 60 MG CPDR delayed release capsule Take 1 capsule by mouth daily 30 capsule 5    fluticasone (FLONASE) 50 MCG/ACT nasal spray 1 spray by Nasal route daily 1 Bottle 5     No current facility-administered medications for this visit. Current Outpatient Prescriptions on File Prior to Visit   Medication Sig Dispense Refill    valACYclovir (VALTREX) 1 g tablet Take 1,000 mg by mouth 2 times daily      dexlansoprazole (DEXILANT) 60 MG CPDR delayed release capsule Take 1 capsule by mouth daily 30 capsule 5    fluticasone (FLONASE) 50 MCG/ACT nasal spray 1 spray by Nasal route daily 1 Bottle 5     No current facility-administered medications on file prior to visit.       Allergies   Allergen Reactions    Doxycycline Other (See Comments)     Heart burn    Prochlorperazine Edisylate      ANXIETY     Health Maintenance   Topic Date Due    HIV screen  02/14/1987    DTaP/Tdap/Td vaccine (1 - Tdap) 02/14/1991    Cervical cancer screen  03/01/2021    Lipid screen  01/16/2022    Flu vaccine  Completed       Review of Systems    Review of Systems   Constitutional: Negative for activity change, appetite change, fatigue and

## 2018-06-01 RX ORDER — DEXLANSOPRAZOLE 60 MG/1
60 CAPSULE, DELAYED RELEASE ORAL DAILY
Qty: 30 CAPSULE | Refills: 5 | Status: SHIPPED | OUTPATIENT
Start: 2018-06-01 | End: 2018-10-15 | Stop reason: SDUPTHER

## 2018-07-18 ENCOUNTER — OFFICE VISIT (OUTPATIENT)
Dept: INTERNAL MEDICINE CLINIC | Age: 48
End: 2018-07-18
Payer: COMMERCIAL

## 2018-07-18 VITALS
HEART RATE: 78 BPM | OXYGEN SATURATION: 98 % | DIASTOLIC BLOOD PRESSURE: 64 MMHG | WEIGHT: 170.5 LBS | TEMPERATURE: 98.5 F | SYSTOLIC BLOOD PRESSURE: 108 MMHG | BODY MASS INDEX: 26.76 KG/M2 | RESPIRATION RATE: 16 BRPM | HEIGHT: 67 IN

## 2018-07-18 DIAGNOSIS — Z79.899 LONG-TERM USE OF HIGH-RISK MEDICATION: ICD-10-CM

## 2018-07-18 DIAGNOSIS — G43.809 OTHER MIGRAINE WITHOUT STATUS MIGRAINOSUS, NOT INTRACTABLE: Primary | ICD-10-CM

## 2018-07-18 PROCEDURE — 99213 OFFICE O/P EST LOW 20 MIN: CPT | Performed by: NURSE PRACTITIONER

## 2018-07-18 RX ORDER — OXYCODONE HYDROCHLORIDE AND ACETAMINOPHEN 5; 325 MG/1; MG/1
1 TABLET ORAL DAILY PRN
Qty: 30 TABLET | Refills: 0 | Status: SHIPPED | OUTPATIENT
Start: 2018-07-18 | End: 2018-10-15 | Stop reason: SDUPTHER

## 2018-07-18 RX ORDER — ALPRAZOLAM 1 MG/1
TABLET ORAL
Refills: 2 | COMMUNITY
Start: 2018-05-16 | End: 2018-12-15 | Stop reason: SDUPTHER

## 2018-07-18 RX ORDER — BUTALBITAL, ACETAMINOPHEN AND CAFFEINE 50; 325; 40 MG/1; MG/1; MG/1
1 TABLET ORAL EVERY 4 HOURS PRN
Qty: 60 TABLET | Refills: 0 | Status: SHIPPED | OUTPATIENT
Start: 2018-07-18 | End: 2018-10-15 | Stop reason: ALTCHOICE

## 2018-07-18 ASSESSMENT — ENCOUNTER SYMPTOMS
VISUAL CHANGE: 0
BLURRED VISION: 0
WHEEZING: 0
SINUS PRESSURE: 0
EYE ITCHING: 0
EYE WATERING: 0
SORE THROAT: 0
VOMITING: 0
SWOLLEN GLANDS: 0
FACIAL SWEATING: 0
DIARRHEA: 0
EYE REDNESS: 0
EYE DISCHARGE: 0
CHEST TIGHTNESS: 0
COUGH: 0
RHINORRHEA: 0
SCALP TENDERNESS: 0
CONSTIPATION: 0
ABDOMINAL PAIN: 0
BACK PAIN: 0
EYE PAIN: 0
SHORTNESS OF BREATH: 0
PHOTOPHOBIA: 1
NAUSEA: 1

## 2018-07-21 LAB
6-ACETYLMORPHINE: NOT DETECTED
7-AMINOCLONAZEPAM: NOT DETECTED
ALPHA-OH-ALPRAZOLAM: NOT DETECTED
ALPRAZOLAM: NOT DETECTED
AMPHETAMINE: NOT DETECTED
BARBITURATES: NOT DETECTED
BENZOYLECGONINE: NOT DETECTED
BUPRENORPHINE: NOT DETECTED
CARISOPRODOL: NOT DETECTED
CLONAZEPAM: NOT DETECTED
CODEINE: NOT DETECTED
CREATININE URINE: 86.4 MG/DL (ref 20–400)
DIAZEPAM: NOT DETECTED
EER PAIN MGT DRUG PANEL, HIGH RES/EMIT U: NORMAL
ETHYL GLUCURONIDE: PRESENT
FENTANYL: NOT DETECTED
HYDROCODONE: NOT DETECTED
HYDROMORPHONE: NOT DETECTED
LORAZEPAM: NOT DETECTED
MARIJUANA METABOLITE: NOT DETECTED
MDA: NOT DETECTED
MDEA: NOT DETECTED
MDMA URINE: NOT DETECTED
MEPERIDINE: NOT DETECTED
METHADONE: NOT DETECTED
METHAMPHETAMINE: NOT DETECTED
METHYLPHENIDATE: NOT DETECTED
MIDAZOLAM: NOT DETECTED
MORPHINE: NOT DETECTED
NORBUPRENORPHINE, FREE: NOT DETECTED
NORDIAZEPAM: NOT DETECTED
NORFENTANYL: NOT DETECTED
NORHYDROCODONE, URINE: NOT DETECTED
NOROXYCODONE: NOT DETECTED
NOROXYMORPHONE, URINE: NOT DETECTED
OXAZEPAM: NOT DETECTED
OXYCODONE: NOT DETECTED
OXYMORPHONE: NOT DETECTED
PAIN MANAGEMENT DRUG PANEL: NORMAL
PCP: NOT DETECTED
PHENTERMINE: NOT DETECTED
PROPOXYPHENE: NOT DETECTED
TAPENTADOL, URINE: NOT DETECTED
TAPENTADOL-O-SULFATE, URINE: NOT DETECTED
TEMAZEPAM: NOT DETECTED
TRAMADOL: NOT DETECTED
ZOLPIDEM: NOT DETECTED

## 2018-07-30 ENCOUNTER — TELEPHONE (OUTPATIENT)
Dept: INTERNAL MEDICINE CLINIC | Age: 48
End: 2018-07-30

## 2018-10-12 ENCOUNTER — NURSE ONLY (OUTPATIENT)
Dept: FAMILY MEDICINE CLINIC | Age: 48
End: 2018-10-12
Payer: COMMERCIAL

## 2018-10-12 DIAGNOSIS — Z23 NEED FOR INFLUENZA VACCINATION: Primary | ICD-10-CM

## 2018-10-12 PROCEDURE — 90688 IIV4 VACCINE SPLT 0.5 ML IM: CPT | Performed by: FAMILY MEDICINE

## 2018-10-12 PROCEDURE — 90471 IMMUNIZATION ADMIN: CPT | Performed by: FAMILY MEDICINE

## 2018-10-15 ENCOUNTER — OFFICE VISIT (OUTPATIENT)
Dept: INTERNAL MEDICINE CLINIC | Age: 48
End: 2018-10-15
Payer: COMMERCIAL

## 2018-10-15 VITALS
TEMPERATURE: 98.4 F | WEIGHT: 169 LBS | BODY MASS INDEX: 29.95 KG/M2 | HEART RATE: 77 BPM | RESPIRATION RATE: 14 BRPM | HEIGHT: 63 IN | OXYGEN SATURATION: 98 % | DIASTOLIC BLOOD PRESSURE: 70 MMHG | SYSTOLIC BLOOD PRESSURE: 112 MMHG

## 2018-10-15 DIAGNOSIS — G43.809 OTHER MIGRAINE WITHOUT STATUS MIGRAINOSUS, NOT INTRACTABLE: ICD-10-CM

## 2018-10-15 DIAGNOSIS — F41.9 ANXIETY: ICD-10-CM

## 2018-10-15 PROCEDURE — 99213 OFFICE O/P EST LOW 20 MIN: CPT | Performed by: FAMILY MEDICINE

## 2018-10-15 RX ORDER — TOPIRAMATE 50 MG/1
100 TABLET, FILM COATED ORAL 2 TIMES DAILY
Refills: 3 | COMMUNITY
Start: 2018-10-04

## 2018-10-15 RX ORDER — TRAZODONE HYDROCHLORIDE 50 MG/1
TABLET ORAL NIGHTLY
Refills: 3 | COMMUNITY
Start: 2018-10-04

## 2018-10-15 RX ORDER — ALPRAZOLAM 1 MG/1
1 TABLET ORAL 2 TIMES DAILY PRN
Qty: 60 TABLET | Refills: 2 | Status: SHIPPED | OUTPATIENT
Start: 2018-10-15 | End: 2018-11-14

## 2018-10-15 RX ORDER — DEXLANSOPRAZOLE 60 MG/1
60 CAPSULE, DELAYED RELEASE ORAL DAILY
Qty: 30 CAPSULE | Refills: 5 | Status: SHIPPED | OUTPATIENT
Start: 2018-10-15 | End: 2019-01-17 | Stop reason: SDUPTHER

## 2018-10-15 RX ORDER — OXYCODONE HYDROCHLORIDE AND ACETAMINOPHEN 5; 325 MG/1; MG/1
1 TABLET ORAL DAILY PRN
Qty: 30 TABLET | Refills: 0 | Status: SHIPPED | OUTPATIENT
Start: 2018-10-15 | End: 2019-03-14 | Stop reason: SDUPTHER

## 2018-10-15 RX ORDER — SULFAMETHOXAZOLE AND TRIMETHOPRIM 800; 160 MG/1; MG/1
TABLET ORAL
Refills: 1 | COMMUNITY
Start: 2018-10-04 | End: 2020-03-09 | Stop reason: CLARIF

## 2018-10-15 NOTE — PROGRESS NOTES
Patient is seen in follow up for   Chief Complaint   Patient presents with    Medication Check     3 month follow up on CSM, percocet and xanax refill. Contract - 10/31/17 tox screen 18.  Health Maintenance     declines HIV screen     Migraine     States Fioricet didn't help      HPIhere for follow u op on migraines seeing Neurology. Past Medical History:   Diagnosis Date    Allergic rhinitis     Anxiety     Depression     GERD (gastroesophageal reflux disease)     Herpes simplex virus type 1 (HSV-1) dermatitis     oral only.      Patient Active Problem List    Diagnosis Date Noted    Migraine 10/31/2017    Breast mass 2015    Anxiety     Allergic rhinitis     GERD (gastroesophageal reflux disease)     Depression      Past Surgical History:   Procedure Laterality Date    BREAST LUMPECTOMY Left 2014    DIL[facility]URETT[facility]TERUS  2016     [facility]    TONSILLECTOMY AND ADENOIDECTOMY      UPPER GASTROINTESTINAL ENDOSCOPY  08    DR Tiffany Jackson    UPPER GASTROINTESTINAL ENDOSCOPY  8/27/10    DR Tiffany Jackson     Family History   Problem Relation Age of Onset    No Known Problems Mother     No Known Problems Father     No Known Problems Paternal Grandfather     No Known Problems Paternal Grandmother     No Known Problems Maternal Grandmother     No Known Problems Maternal Grandfather     No Known Problems Brother     No Known Problems Sister     No Known Problems Other     Breast Cancer Neg Hx     Cancer Neg Hx     Colon Cancer Neg Hx     Diabetes Neg Hx     Eclampsia Neg Hx     Hypertension Neg Hx     Ovarian Cancer Neg Hx      Labor Neg Hx     Spont Abortions Neg Hx     Stroke Neg Hx      Social History     Social History    Marital status:      Spouse name: N/A    Number of children: N/A    Years of education: N/A     Social History Main Topics    Smoking status: Former Smoker    Smokeless tobacco: Never Used    Alcohol use 0.0 oz/week Comment: occ    Drug use: No    Sexual activity: Yes     Partners: Male     Other Topics Concern    None     Social History Narrative    None     Current Outpatient Prescriptions   Medication Sig Dispense Refill    traZODone (DESYREL) 50 MG tablet TK 1 T PO QHS  3    topiramate (TOPAMAX) 50 MG tablet TK 1 T PO QHS  3    sulfamethoxazole-trimethoprim (BACTRIM DS;SEPTRA DS) 800-160 MG per tablet TK 1 T PO BID  1    oxyCODONE-acetaminophen (PERCOCET) 5-325 MG per tablet Take 1 tablet by mouth daily as needed (migraine) for up to 30 days. Take one as needed for migraine. Aftab Filler Date: 10/15/18 30 tablet 0    ALPRAZolam (XANAX) 1 MG tablet Take 1 tablet by mouth 2 times daily as needed for Sleep or Anxiety for up to 30 days. . 60 tablet 2    dexlansoprazole (DEXILANT) 60 MG CPDR delayed release capsule Take 1 capsule by mouth daily 30 capsule 5    ALPRAZolam (XANAX) 1 MG tablet TAKE ONE TABLET BY MOUTH TWICE DAILY AS NEEDED FOR SLEEP OR ANXIETY FOR UP TO 30 DAYS  2    buPROPion (WELLBUTRIN XL) 300 MG extended release tablet Take 1 tablet by mouth every morning 90 tablet 3    valACYclovir (VALTREX) 1 g tablet Take 1,000 mg by mouth 2 times daily      fluticasone (FLONASE) 50 MCG/ACT nasal spray 1 spray by Nasal route daily 1 Bottle 5     No current facility-administered medications for this visit. Current Outpatient Prescriptions on File Prior to Visit   Medication Sig Dispense Refill    ALPRAZolam (XANAX) 1 MG tablet TAKE ONE TABLET BY MOUTH TWICE DAILY AS NEEDED FOR SLEEP OR ANXIETY FOR UP TO 30 DAYS  2    buPROPion (WELLBUTRIN XL) 300 MG extended release tablet Take 1 tablet by mouth every morning 90 tablet 3    valACYclovir (VALTREX) 1 g tablet Take 1,000 mg by mouth 2 times daily      fluticasone (FLONASE) 50 MCG/ACT nasal spray 1 spray by Nasal route daily 1 Bottle 5     No current facility-administered medications on file prior to visit.       Allergies   Allergen Reactions    Musculoskeletal: Normal range of motion. Lymphadenopathy:     She has no cervical adenopathy. Neurological: She is alert and oriented to person, place, and time. No cranial nerve deficit. Coordination normal.   Skin: Skin is warm and dry. Psychiatric: She has a normal mood and affect. Assessment   Diagnosis Orders   1. Other migraine without status migrainosus, not intractable  oxyCODONE-acetaminophen (PERCOCET) 5-325 MG per tablet   2. Anxiety  ALPRAZolam (XANAX) 1 MG tablet     Problem List     Anxiety    Relevant Medications    buPROPion (WELLBUTRIN XL) 300 MG extended release tablet    ALPRAZolam (XANAX) 1 MG tablet    traZODone (DESYREL) 50 MG tablet    ALPRAZolam (XANAX) 1 MG tablet    Migraine    Relevant Medications    buPROPion (WELLBUTRIN XL) 300 MG extended release tablet    traZODone (DESYREL) 50 MG tablet    topiramate (TOPAMAX) 50 MG tablet    oxyCODONE-acetaminophen (PERCOCET) 5-325 MG per tablet          Plan  No orders of the defined types were placed in this encounter. Orders Placed This Encounter   Medications    oxyCODONE-acetaminophen (PERCOCET) 5-325 MG per tablet     Sig: Take 1 tablet by mouth daily as needed (migraine) for up to 30 days. Take one as needed for migraine. Alisha Page Date: 10/15/18     Dispense:  30 tablet     Refill:  0    ALPRAZolam (XANAX) 1 MG tablet     Sig: Take 1 tablet by mouth 2 times daily as needed for Sleep or Anxiety for up to 30 days. .     Dispense:  60 tablet     Refill:  2    dexlansoprazole (DEXILANT) 60 MG CPDR delayed release capsule     Sig: Take 1 capsule by mouth daily     Dispense:  30 capsule     Refill:  5     Controlled Substances Monitoring:     RX Monitoring 10/15/2018   Attestation The Prescription Monitoring Report for this patient was reviewed today. Documentation Possible medication side effects, risk of tolerance/dependence & alternative treatments discussed. ;No signs of potential drug abuse or diversion identified.

## 2018-12-15 DIAGNOSIS — F41.9 ANXIETY: Primary | ICD-10-CM

## 2018-12-15 RX ORDER — VALACYCLOVIR HYDROCHLORIDE 1 G/1
TABLET, FILM COATED ORAL
Qty: 21 TABLET | Refills: 1 | Status: CANCELLED | OUTPATIENT
Start: 2018-12-15

## 2018-12-18 RX ORDER — ALPRAZOLAM 1 MG/1
TABLET ORAL
Qty: 60 TABLET | Refills: 1 | Status: SHIPPED | OUTPATIENT
Start: 2018-12-18 | End: 2019-03-14 | Stop reason: SDUPTHER

## 2018-12-24 NOTE — TELEPHONE ENCOUNTER
Patient called for a refill on cold sore treatment Rx. could not locate on med.  list    [facility], Mario Ballard
rx sent
good minus

## 2018-12-26 RX ORDER — VALACYCLOVIR HYDROCHLORIDE 1 G/1
1000 TABLET, FILM COATED ORAL 3 TIMES DAILY
Qty: 21 TABLET | Refills: 3 | Status: SHIPPED | OUTPATIENT
Start: 2018-12-26 | End: 2019-01-02

## 2019-01-17 RX ORDER — DEXLANSOPRAZOLE 60 MG/1
60 CAPSULE, DELAYED RELEASE ORAL DAILY
Qty: 90 CAPSULE | Refills: 1 | Status: SHIPPED | OUTPATIENT
Start: 2019-01-17 | End: 2019-07-30 | Stop reason: SDUPTHER

## 2019-03-05 ENCOUNTER — OFFICE VISIT (OUTPATIENT)
Dept: OBGYN CLINIC | Age: 49
End: 2019-03-05
Payer: COMMERCIAL

## 2019-03-05 VITALS
DIASTOLIC BLOOD PRESSURE: 66 MMHG | WEIGHT: 155 LBS | BODY MASS INDEX: 27.46 KG/M2 | HEIGHT: 63 IN | SYSTOLIC BLOOD PRESSURE: 104 MMHG

## 2019-03-05 DIAGNOSIS — Z11.51 SCREENING FOR HUMAN PAPILLOMAVIRUS: ICD-10-CM

## 2019-03-05 DIAGNOSIS — Z01.419 WOMEN'S ANNUAL ROUTINE GYNECOLOGICAL EXAMINATION: Primary | ICD-10-CM

## 2019-03-05 DIAGNOSIS — Z12.31 SCREENING MAMMOGRAM, ENCOUNTER FOR: ICD-10-CM

## 2019-03-05 PROCEDURE — 99396 PREV VISIT EST AGE 40-64: CPT | Performed by: OBSTETRICS & GYNECOLOGY

## 2019-03-05 RX ORDER — METHYLPHENIDATE HYDROCHLORIDE 10 MG/1
TABLET ORAL
Refills: 0 | COMMUNITY
Start: 2019-02-20 | End: 2020-03-09 | Stop reason: CLARIF

## 2019-03-05 ASSESSMENT — PATIENT HEALTH QUESTIONNAIRE - PHQ9
SUM OF ALL RESPONSES TO PHQ QUESTIONS 1-9: 0
1. LITTLE INTEREST OR PLEASURE IN DOING THINGS: 0
2. FEELING DOWN, DEPRESSED OR HOPELESS: 0
SUM OF ALL RESPONSES TO PHQ QUESTIONS 1-9: 0
SUM OF ALL RESPONSES TO PHQ9 QUESTIONS 1 & 2: 0

## 2019-03-05 ASSESSMENT — ENCOUNTER SYMPTOMS
BLOOD IN STOOL: 0
RESPIRATORY NEGATIVE: 1
CONSTIPATION: 0
ABDOMINAL PAIN: 0
ANAL BLEEDING: 0
NAUSEA: 0
RECTAL PAIN: 0
VOMITING: 0
ABDOMINAL DISTENTION: 0
DIARRHEA: 0
EYES NEGATIVE: 1
ALLERGIC/IMMUNOLOGIC NEGATIVE: 1

## 2019-03-13 DIAGNOSIS — Z11.51 SCREENING FOR HUMAN PAPILLOMAVIRUS: ICD-10-CM

## 2019-03-13 DIAGNOSIS — Z01.419 WOMEN'S ANNUAL ROUTINE GYNECOLOGICAL EXAMINATION: ICD-10-CM

## 2019-03-14 ENCOUNTER — OFFICE VISIT (OUTPATIENT)
Dept: INTERNAL MEDICINE CLINIC | Age: 49
End: 2019-03-14
Payer: COMMERCIAL

## 2019-03-14 ENCOUNTER — TELEPHONE (OUTPATIENT)
Dept: FAMILY MEDICINE CLINIC | Age: 49
End: 2019-03-14

## 2019-03-14 ENCOUNTER — HOSPITAL ENCOUNTER (OUTPATIENT)
Dept: WOMENS IMAGING | Age: 49
Discharge: HOME | End: 2019-03-16
Payer: COMMERCIAL

## 2019-03-14 VITALS
RESPIRATION RATE: 14 BRPM | DIASTOLIC BLOOD PRESSURE: 68 MMHG | SYSTOLIC BLOOD PRESSURE: 106 MMHG | BODY MASS INDEX: 27.64 KG/M2 | WEIGHT: 156 LBS | OXYGEN SATURATION: 99 % | HEART RATE: 60 BPM | TEMPERATURE: 97.9 F | HEIGHT: 63 IN

## 2019-03-14 DIAGNOSIS — F41.9 ANXIETY: ICD-10-CM

## 2019-03-14 DIAGNOSIS — G43.809 OTHER MIGRAINE WITHOUT STATUS MIGRAINOSUS, NOT INTRACTABLE: ICD-10-CM

## 2019-03-14 DIAGNOSIS — Z12.31 SCREENING MAMMOGRAM, ENCOUNTER FOR: ICD-10-CM

## 2019-03-14 LAB
ALBUMIN SERPL-MCNC: 4.7 G/DL (ref 3.5–4.6)
ALP BLD-CCNC: 55 U/L (ref 40–130)
ALT SERPL-CCNC: 10 U/L (ref 0–33)
ANION GAP SERPL CALCULATED.3IONS-SCNC: 18 MEQ/L (ref 9–15)
AST SERPL-CCNC: 16 U/L (ref 0–35)
BASOPHILS ABSOLUTE: 0 K/UL (ref 0–0.2)
BASOPHILS RELATIVE PERCENT: 0.8 %
BILIRUB SERPL-MCNC: 0.3 MG/DL (ref 0.2–0.7)
BUN BLDV-MCNC: 18 MG/DL (ref 6–20)
CALCIUM SERPL-MCNC: 9.4 MG/DL (ref 8.5–9.9)
CHLORIDE BLD-SCNC: 108 MEQ/L (ref 95–107)
CHOLESTEROL, TOTAL: 154 MG/DL (ref 0–199)
CO2: 19 MEQ/L (ref 20–31)
CREAT SERPL-MCNC: 0.81 MG/DL (ref 0.5–0.9)
EOSINOPHILS ABSOLUTE: 0.1 K/UL (ref 0–0.7)
EOSINOPHILS RELATIVE PERCENT: 2.6 %
GFR AFRICAN AMERICAN: >60
GFR NON-AFRICAN AMERICAN: >60
GLOBULIN: 2.7 G/DL (ref 2.3–3.5)
GLUCOSE BLD-MCNC: 78 MG/DL (ref 70–99)
HCT VFR BLD CALC: 40.4 % (ref 37–47)
HDLC SERPL-MCNC: 56 MG/DL (ref 40–59)
HEMOGLOBIN: 13.4 G/DL (ref 12–16)
HIV AG/AB: NORMAL
LDL CHOLESTEROL CALCULATED: 86 MG/DL (ref 0–129)
LYMPHOCYTES ABSOLUTE: 1.4 K/UL (ref 1–4.8)
LYMPHOCYTES RELATIVE PERCENT: 34.1 %
MCH RBC QN AUTO: 31.5 PG (ref 27–31.3)
MCHC RBC AUTO-ENTMCNC: 33.1 % (ref 33–37)
MCV RBC AUTO: 95.1 FL (ref 82–100)
MONOCYTES ABSOLUTE: 0.3 K/UL (ref 0.2–0.8)
MONOCYTES RELATIVE PERCENT: 7.2 %
NEUTROPHILS ABSOLUTE: 2.3 K/UL (ref 1.4–6.5)
NEUTROPHILS RELATIVE PERCENT: 55.3 %
PDW BLD-RTO: 13.9 % (ref 11.5–14.5)
PLATELET # BLD: 231 K/UL (ref 130–400)
POTASSIUM SERPL-SCNC: 4.4 MEQ/L (ref 3.4–4.9)
RBC # BLD: 4.25 M/UL (ref 4.2–5.4)
SODIUM BLD-SCNC: 145 MEQ/L (ref 135–144)
TOTAL PROTEIN: 7.4 G/DL (ref 6.3–8)
TRIGL SERPL-MCNC: 58 MG/DL (ref 0–150)
WBC # BLD: 4.2 K/UL (ref 4.8–10.8)

## 2019-03-14 PROCEDURE — 77067 SCR MAMMO BI INCL CAD: CPT

## 2019-03-14 PROCEDURE — 99396 PREV VISIT EST AGE 40-64: CPT | Performed by: FAMILY MEDICINE

## 2019-03-14 RX ORDER — ALPRAZOLAM 1 MG/1
TABLET ORAL
Qty: 60 TABLET | Refills: 1 | Status: SHIPPED | OUTPATIENT
Start: 2019-03-14 | End: 2020-04-02 | Stop reason: SDUPTHER

## 2019-03-14 RX ORDER — ALPRAZOLAM 1 MG/1
1 TABLET ORAL NIGHTLY PRN
COMMUNITY
End: 2020-12-04 | Stop reason: SDUPTHER

## 2019-03-14 RX ORDER — OXYCODONE HYDROCHLORIDE AND ACETAMINOPHEN 5; 325 MG/1; MG/1
1 TABLET ORAL DAILY PRN
Qty: 30 TABLET | Refills: 0 | Status: SHIPPED | OUTPATIENT
Start: 2019-03-14 | End: 2019-09-25 | Stop reason: SDUPTHER

## 2019-03-14 ASSESSMENT — ENCOUNTER SYMPTOMS
GASTROINTESTINAL NEGATIVE: 1
CHEST TIGHTNESS: 0
COUGH: 0
RESPIRATORY NEGATIVE: 1
RHINORRHEA: 0
EYES NEGATIVE: 1

## 2019-07-30 RX ORDER — DEXLANSOPRAZOLE 60 MG/1
60 CAPSULE, DELAYED RELEASE ORAL DAILY
Qty: 90 CAPSULE | Refills: 1 | Status: SHIPPED | OUTPATIENT
Start: 2019-07-30 | End: 2019-12-30 | Stop reason: SDUPTHER

## 2019-09-25 ENCOUNTER — OFFICE VISIT (OUTPATIENT)
Dept: FAMILY MEDICINE CLINIC | Age: 49
End: 2019-09-25
Payer: COMMERCIAL

## 2019-09-25 VITALS
OXYGEN SATURATION: 94 % | WEIGHT: 159 LBS | RESPIRATION RATE: 16 BRPM | TEMPERATURE: 98.4 F | HEIGHT: 64 IN | DIASTOLIC BLOOD PRESSURE: 64 MMHG | HEART RATE: 63 BPM | BODY MASS INDEX: 27.14 KG/M2 | SYSTOLIC BLOOD PRESSURE: 104 MMHG

## 2019-09-25 DIAGNOSIS — Z23 NEED FOR INFLUENZA VACCINATION: ICD-10-CM

## 2019-09-25 DIAGNOSIS — G89.29 CHRONIC LEFT-SIDED LOW BACK PAIN WITH RIGHT-SIDED SCIATICA: ICD-10-CM

## 2019-09-25 DIAGNOSIS — G43.809 OTHER MIGRAINE WITHOUT STATUS MIGRAINOSUS, NOT INTRACTABLE: Primary | ICD-10-CM

## 2019-09-25 DIAGNOSIS — Z79.899 ENCOUNTER FOR LONG-TERM CURRENT USE OF MEDICATION: ICD-10-CM

## 2019-09-25 DIAGNOSIS — M54.41 CHRONIC LEFT-SIDED LOW BACK PAIN WITH RIGHT-SIDED SCIATICA: ICD-10-CM

## 2019-09-25 PROCEDURE — 90686 IIV4 VACC NO PRSV 0.5 ML IM: CPT | Performed by: FAMILY MEDICINE

## 2019-09-25 PROCEDURE — 90471 IMMUNIZATION ADMIN: CPT | Performed by: FAMILY MEDICINE

## 2019-09-25 PROCEDURE — 99213 OFFICE O/P EST LOW 20 MIN: CPT | Performed by: FAMILY MEDICINE

## 2019-09-25 RX ORDER — OXYCODONE HYDROCHLORIDE AND ACETAMINOPHEN 5; 325 MG/1; MG/1
1 TABLET ORAL DAILY PRN
Qty: 30 TABLET | Refills: 0 | Status: SHIPPED | OUTPATIENT
Start: 2019-09-25 | End: 2019-10-25

## 2019-09-25 ASSESSMENT — ENCOUNTER SYMPTOMS
GASTROINTESTINAL NEGATIVE: 1
BACK PAIN: 1
COUGH: 0
RESPIRATORY NEGATIVE: 1
EYES NEGATIVE: 1
RHINORRHEA: 0
CHEST TIGHTNESS: 0

## 2019-09-25 NOTE — PROGRESS NOTES
this visit. Current Outpatient Medications on File Prior to Visit   Medication Sig Dispense Refill    dexlansoprazole (DEXILANT) 60 MG CPDR delayed release capsule Take 1 capsule by mouth daily 90 capsule 1    buPROPion (WELLBUTRIN XL) 300 MG extended release tablet Take 1 tablet by mouth every morning 90 tablet 3    ALPRAZolam (XANAX) 1 MG tablet Take 1 mg by mouth nightly as needed for Sleep or Anxiety.  methylphenidate (RITALIN) 10 MG tablet TK 1 T PO  QAM AND 1 T AT LUNCHTIME  0    traZODone (DESYREL) 50 MG tablet TK 1 T PO QHS  3    topiramate (TOPAMAX) 50 MG tablet TK 1 T PO QHS  3    sulfamethoxazole-trimethoprim (BACTRIM DS;SEPTRA DS) 800-160 MG per tablet TK 1 T PO BID  1    fluticasone (FLONASE) 50 MCG/ACT nasal spray 1 spray by Nasal route daily 1 Bottle 5     No current facility-administered medications on file prior to visit. Allergies   Allergen Reactions    Doxycycline Other (See Comments)     Heart burn    Prochlorperazine Edisylate      ANXIETY     Health Maintenance   Topic Date Due    Cervical cancer screen  03/11/2022    DTaP/Tdap/Td vaccine (2 - Td) 12/25/2023    Lipid screen  03/14/2024    Flu vaccine  Completed    HIV screen  Completed    Pneumococcal 0-64 years Vaccine  Aged Out       Review of Systems     Review of Systems   Constitutional: Negative for activity change, appetite change, fatigue and fever. HENT: Negative for congestion and rhinorrhea. Eyes: Negative. Respiratory: Negative. Negative for cough and chest tightness. Cardiovascular: Negative. Gastrointestinal: Negative. Endocrine: Negative. Genitourinary: Negative. Musculoskeletal: Positive for arthralgias and back pain. Skin: Negative. Neurological: Negative for dizziness, light-headedness and numbness. Hematological: Negative. Psychiatric/Behavioral: Negative.         Physical Exam  Vitals:    09/25/19 1135   BP: 104/64   Site: Left Upper Arm   Position: Sitting

## 2019-09-26 DIAGNOSIS — Z79.899 ENCOUNTER FOR LONG-TERM CURRENT USE OF MEDICATION: ICD-10-CM

## 2019-09-30 LAB
6-ACETYLMORPHINE: NOT DETECTED
7-AMINOCLONAZEPAM: NOT DETECTED
ALPHA-OH-ALPRAZOLAM: NOT DETECTED
ALPRAZOLAM: NOT DETECTED
AMPHETAMINE: NOT DETECTED
BARBITURATES: NOT DETECTED
BENZOYLECGONINE: NOT DETECTED
BUPRENORPHINE: NOT DETECTED
CARISOPRODOL: NOT DETECTED
CLONAZEPAM: NOT DETECTED
CODEINE: NOT DETECTED
CREATININE URINE: 82.6 MG/DL (ref 20–400)
DIAZEPAM: NOT DETECTED
EER PAIN MGT DRUG PANEL, HIGH RES/EMIT U: NORMAL
ETHYL GLUCURONIDE: NOT DETECTED
FENTANYL: NOT DETECTED
HYDROCODONE: NOT DETECTED
HYDROMORPHONE: NOT DETECTED
LORAZEPAM: NOT DETECTED
MARIJUANA METABOLITE: NOT DETECTED
MDA: NOT DETECTED
MDEA: NOT DETECTED
MDMA URINE: NOT DETECTED
MEPERIDINE: NOT DETECTED
METHADONE: NOT DETECTED
METHAMPHETAMINE: NOT DETECTED
METHYLPHENIDATE: PRESENT
MIDAZOLAM: NOT DETECTED
MORPHINE: NOT DETECTED
NORBUPRENORPHINE, FREE: NOT DETECTED
NORDIAZEPAM: NOT DETECTED
NORFENTANYL: NOT DETECTED
NORHYDROCODONE, URINE: NOT DETECTED
NOROXYCODONE: NOT DETECTED
NOROXYMORPHONE, URINE: NOT DETECTED
OXAZEPAM: NOT DETECTED
OXYCODONE: NOT DETECTED
OXYMORPHONE: NOT DETECTED
PAIN MANAGEMENT DRUG PANEL: NORMAL
PCP: NOT DETECTED
PHENTERMINE: NOT DETECTED
PROPOXYPHENE: NOT DETECTED
TAPENTADOL, URINE: NOT DETECTED
TAPENTADOL-O-SULFATE, URINE: NOT DETECTED
TEMAZEPAM: NOT DETECTED
TRAMADOL: NOT DETECTED
ZOLPIDEM: NOT DETECTED

## 2019-12-31 RX ORDER — DEXLANSOPRAZOLE 60 MG/1
60 CAPSULE, DELAYED RELEASE ORAL DAILY
Qty: 90 CAPSULE | Refills: 3 | Status: SHIPPED | OUTPATIENT
Start: 2019-12-31 | End: 2021-01-21 | Stop reason: SDUPTHER

## 2020-03-09 ENCOUNTER — OFFICE VISIT (OUTPATIENT)
Dept: OBGYN CLINIC | Age: 50
End: 2020-03-09
Payer: COMMERCIAL

## 2020-03-09 VITALS
SYSTOLIC BLOOD PRESSURE: 124 MMHG | WEIGHT: 159 LBS | BODY MASS INDEX: 28.17 KG/M2 | DIASTOLIC BLOOD PRESSURE: 68 MMHG | HEIGHT: 63 IN

## 2020-03-09 PROCEDURE — 99396 PREV VISIT EST AGE 40-64: CPT | Performed by: OBSTETRICS & GYNECOLOGY

## 2020-03-09 RX ORDER — VALACYCLOVIR HYDROCHLORIDE 500 MG/1
500 TABLET, FILM COATED ORAL 2 TIMES DAILY
Qty: 4 TABLET | Refills: 3 | Status: SHIPPED | OUTPATIENT
Start: 2020-03-09 | End: 2020-03-22 | Stop reason: SDUPTHER

## 2020-03-09 RX ORDER — DEXTROAMPHETAMINE SULFATE, DEXTROAMPHETAMINE SACCHARATE, AMPHETAMINE SULFATE AND AMPHETAMINE ASPARTATE 7.5; 7.5; 7.5; 7.5 MG/1; MG/1; MG/1; MG/1
CAPSULE, EXTENDED RELEASE ORAL
COMMUNITY
Start: 2020-01-24

## 2020-03-09 ASSESSMENT — ENCOUNTER SYMPTOMS
ABDOMINAL DISTENTION: 0
DIARRHEA: 0
ABDOMINAL PAIN: 0
BLOOD IN STOOL: 0
NAUSEA: 0
ANAL BLEEDING: 0
RECTAL PAIN: 0
VOMITING: 0
EYES NEGATIVE: 1
ALLERGIC/IMMUNOLOGIC NEGATIVE: 1
RESPIRATORY NEGATIVE: 1
CONSTIPATION: 0

## 2020-03-09 ASSESSMENT — PATIENT HEALTH QUESTIONNAIRE - PHQ9
2. FEELING DOWN, DEPRESSED OR HOPELESS: 0
SUM OF ALL RESPONSES TO PHQ QUESTIONS 1-9: 0
1. LITTLE INTEREST OR PLEASURE IN DOING THINGS: 0
SUM OF ALL RESPONSES TO PHQ9 QUESTIONS 1 & 2: 0
SUM OF ALL RESPONSES TO PHQ QUESTIONS 1-9: 0

## 2020-03-09 NOTE — PROGRESS NOTES
level: Not on file   Occupational History    Not on file   Social Needs    Financial resource strain: Not on file    Food insecurity     Worry: Not on file     Inability: Not on file    Transportation needs     Medical: Not on file     Non-medical: Not on file   Tobacco Use    Smoking status: Former Smoker    Smokeless tobacco: Never Used   Substance and Sexual Activity    Alcohol use: Yes     Alcohol/week: 0.0 standard drinks     Comment: occ    Drug use: No    Sexual activity: Yes     Partners: Male   Lifestyle    Physical activity     Days per week: Not on file     Minutes per session: Not on file    Stress: Not on file   Relationships    Social connections     Talks on phone: Not on file     Gets together: Not on file     Attends Zoroastrian service: Not on file     Active member of club or organization: Not on file     Attends meetings of clubs or organizations: Not on file     Relationship status: Not on file    Intimate partner violence     Fear of current or ex partner: Not on file     Emotionally abused: Not on file     Physically abused: Not on file     Forced sexual activity: Not on file   Other Topics Concern    Not on file   Social History Narrative    Not on file     Family History   Problem Relation Age of Onset    No Known Problems Mother     No Known Problems Father     No Known Problems Paternal Grandfather     No Known Problems Paternal Grandmother     No Known Problems Maternal Grandmother     No Known Problems Maternal Grandfather     No Known Problems Brother     No Known Problems Sister     No Known Problems Other     Breast Cancer Neg Hx     Cancer Neg Hx     Colon Cancer Neg Hx     Diabetes Neg Hx     Eclampsia Neg Hx     Hypertension Neg Hx     Ovarian Cancer Neg Hx      Labor Neg Hx     Spont Abortions Neg Hx     Stroke Neg Hx        Review of Systems   Constitutional: Negative.   Negative for activity change, appetite change, chills, diaphoresis, tenderness or bleeding. Cervix: No cervical motion tenderness, discharge or friability. Uterus: Not deviated, not enlarged, not fixed and not tender. Adnexa:         Right: No mass, tenderness or fullness. Left: No mass, tenderness or fullness. Rectum: Normal.   Musculoskeletal: Normal range of motion. General: No tenderness. Lymphadenopathy:      Cervical: No cervical adenopathy. Skin:     General: Skin is warm and dry. Coloration: Skin is not pale. Findings: No erythema or rash. Neurological:      Mental Status: She is alert and oriented to person, place, and time. Psychiatric:         Behavior: Behavior normal.         Thought Content: Thought content normal.         Judgment: Judgment normal.         Assessment:      Diagnosis Orders   1. Women's annual routine gynecological examination  PAP SMEAR   2. Special screening examination for human papillomavirus (HPV)  PAP SMEAR   3. Screening mammogram, encounter for  SOCO DIGITAL SCREEN W CAD BILATERAL         Plan:      Medications placedthis encounter:  No orders of the defined types were placed in this encounter. Orders placedthis encounter:  Orders Placed This Encounter   Procedures    SOCO DIGITAL SCREEN W CAD BILATERAL     Standing Status:   Future     Standing Expiration Date:   3/9/2021    PAP SMEAR     Standing Status:   Future     Standing Expiration Date:   3/9/2021     Order Specific Question:   Collection Type     Answer: Thin Prep     Order Specific Question:   Prior Abnormal Pap Test     Answer:   No     Order Specific Question:   Screening or Diagnostic     Answer:   Screening     Order Specific Question:   HPV Requested? Answer:   Yes     Order Specific Question:   High Risk Patient     Answer:   N/A         Follow up:  Return in about 1 year (around 3/9/2021) for Annual.   Repeat Annual GYN exam every 1 year. Cervical Cytology exam starts age 24.    If Negative Cytology; follow-up screening per current guidelines. Mammograms yearly starting at age 36. Calcium and Vitamin D dosing reviewed ( age appropriate ). Colonoscopy and bone density screening discussed ( age appropriate ). Birth control and STD prevention discussed ( age appropriate ). Gardisil counseling completed for all patients 7-33 yo. Routine health maintenance ( per PCP and guidelines ). The patient was asked if she would like a chaperone present for her intimate exam. She  Declines the chaperone.  Alla Garcia

## 2020-03-16 ENCOUNTER — TELEPHONE (OUTPATIENT)
Dept: OBGYN CLINIC | Age: 50
End: 2020-03-16

## 2020-03-19 ENCOUNTER — HOSPITAL ENCOUNTER (OUTPATIENT)
Dept: WOMENS IMAGING | Age: 50
Discharge: HOME | End: 2020-03-21
Payer: COMMERCIAL

## 2020-03-19 PROCEDURE — 77063 BREAST TOMOSYNTHESIS BI: CPT

## 2020-03-23 RX ORDER — VALACYCLOVIR HYDROCHLORIDE 500 MG/1
1000 TABLET, FILM COATED ORAL 2 TIMES DAILY PRN
Qty: 21 TABLET | Refills: 3 | Status: SHIPPED | OUTPATIENT
Start: 2020-03-23 | End: 2020-06-22

## 2020-04-02 ENCOUNTER — TELEPHONE (OUTPATIENT)
Dept: FAMILY MEDICINE CLINIC | Age: 50
End: 2020-04-02

## 2020-04-02 ENCOUNTER — VIRTUAL VISIT (OUTPATIENT)
Dept: FAMILY MEDICINE CLINIC | Age: 50
End: 2020-04-02
Payer: COMMERCIAL

## 2020-04-02 PROCEDURE — 99213 OFFICE O/P EST LOW 20 MIN: CPT | Performed by: FAMILY MEDICINE

## 2020-04-02 RX ORDER — ALPRAZOLAM 1 MG/1
TABLET ORAL
Qty: 60 TABLET | Refills: 1 | Status: SHIPPED | OUTPATIENT
Start: 2020-04-02 | End: 2020-05-03

## 2020-04-02 RX ORDER — OXYCODONE HYDROCHLORIDE AND ACETAMINOPHEN 5; 325 MG/1; MG/1
1 TABLET ORAL DAILY PRN
Qty: 30 TABLET | Refills: 0 | Status: SHIPPED | OUTPATIENT
Start: 2020-04-02 | End: 2020-05-02

## 2020-04-02 ASSESSMENT — ENCOUNTER SYMPTOMS
RHINORRHEA: 0
RESPIRATORY NEGATIVE: 1
COUGH: 0
CHEST TIGHTNESS: 0
EYES NEGATIVE: 1
GASTROINTESTINAL NEGATIVE: 1

## 2020-04-02 NOTE — PROGRESS NOTES
Patient is seen in follow up for   Chief Complaint   Patient presents with    Migraine    ADHD   doxy. me visit   Migraine    This is a chronic problem. The current episode started more than 1 year ago. The problem occurs constantly. The pain is located in the bilateral region. Pertinent negatives include no coughing, dizziness, fever, numbness or rhinorrhea. Anxiety controlled  Past Medical History:   Diagnosis Date    ADD (attention deficit disorder)     Allergic rhinitis     Anxiety     Depression     GERD (gastroesophageal reflux disease)     Herpes simplex virus type 1 (HSV-1) dermatitis     oral only.     Migraines      Patient Active Problem List    Diagnosis Date Noted    Migraine 10/31/2017    Breast mass 2015    Anxiety     Allergic rhinitis     GERD (gastroesophageal reflux disease)     Depression     Elderly primigravida, antepartum 2011     Past Surgical History:   Procedure Laterality Date    BREAST LUMPECTOMY Left 2014    [facility]    TONSILLECTOMY AND ADENOIDECTOMY      UPPER GASTROINTESTINAL ENDOSCOPY  08    DR Mustapha Manuel    UPPER GASTROINTESTINAL ENDOSCOPY  8/27/10    DR Mustapha Manuel     Family History   Problem Relation Age of Onset    No Known Problems Mother     No Known Problems Father     No Known Problems Paternal Grandfather     No Known Problems Paternal Grandmother     No Known Problems Maternal Grandmother     No Known Problems Maternal Grandfather     No Known Problems Brother     No Known Problems Sister     No Known Problems Other     Breast Cancer Neg Hx     Cancer Neg Hx     Colon Cancer Neg Hx     Diabetes Neg Hx     Eclampsia Neg Hx     Hypertension Neg Hx     Ovarian Cancer Neg Hx      Labor Neg Hx     Spont Abortions Neg Hx     Stroke Neg Hx      Social History     Socioeconomic History    Marital status:      Spouse name: Not on file    Number of children: Not on file    Years of education: Not on file    Highest education level: Not on file   Occupational History    Not on file   Social Needs    Financial resource strain: Not on file    Food insecurity     Worry: Not on file     Inability: Not on file    Transportation needs     Medical: Not on file     Non-medical: Not on file   Tobacco Use    Smoking status: Former Smoker    Smokeless tobacco: Never Used   Substance and Sexual Activity    Alcohol use: Yes     Alcohol/week: 0.0 standard drinks     Comment: occ    Drug use: No    Sexual activity: Yes     Partners: Male   Lifestyle    Physical activity     Days per week: Not on file     Minutes per session: Not on file    Stress: Not on file   Relationships    Social connections     Talks on phone: Not on file     Gets together: Not on file     Attends Jewish service: Not on file     Active member of club or organization: Not on file     Attends meetings of clubs or organizations: Not on file     Relationship status: Not on file    Intimate partner violence     Fear of current or ex partner: Not on file     Emotionally abused: Not on file     Physically abused: Not on file     Forced sexual activity: Not on file   Other Topics Concern    Not on file   Social History Narrative    Not on file     Current Outpatient Medications   Medication Sig Dispense Refill    ALPRAZolam (XANAX) 1 MG tablet TAKE ONE TABLET BY MOUTH TWICE DAILY AS NEEDED FOR SLEEP OR ANXIETY FOR UP TO 30 DAYS 60 tablet 1    oxyCODONE-acetaminophen (PERCOCET) 5-325 MG per tablet Take 1 tablet by mouth daily as needed (migraine) for up to 30 days. Take one as needed for migraine.  30 tablet 0    ADDERALL XR 30 MG capsule TK ONE C PO QD IN THE MORNING UP ON AWAKENING      VALACYCLOVIR HCL PO       dexlansoprazole (DEXILANT) 60 MG CPDR delayed release capsule Take 1 capsule by mouth daily 90 capsule 3    buPROPion (WELLBUTRIN XL) 300 MG extended release tablet Take 1 tablet by mouth every morning 90 tablet 3    ALPRAZolam (XANAX) 1 MG tablet Take 1 mg by mouth nightly as needed for Sleep or Anxiety.  traZODone (DESYREL) 50 MG tablet TK 1 T PO QHS  3    topiramate (TOPAMAX) 50 MG tablet TK 1 T PO QHS  3    fluticasone (FLONASE) 50 MCG/ACT nasal spray 1 spray by Nasal route daily (Patient not taking: Reported on 3/9/2020) 1 Bottle 5     No current facility-administered medications for this visit. Current Outpatient Medications on File Prior to Visit   Medication Sig Dispense Refill    ADDERALL XR 30 MG capsule TK ONE C PO QD IN THE MORNING UP ON AWAKENING      VALACYCLOVIR HCL PO       dexlansoprazole (DEXILANT) 60 MG CPDR delayed release capsule Take 1 capsule by mouth daily 90 capsule 3    buPROPion (WELLBUTRIN XL) 300 MG extended release tablet Take 1 tablet by mouth every morning 90 tablet 3    ALPRAZolam (XANAX) 1 MG tablet Take 1 mg by mouth nightly as needed for Sleep or Anxiety.  traZODone (DESYREL) 50 MG tablet TK 1 T PO QHS  3    topiramate (TOPAMAX) 50 MG tablet TK 1 T PO QHS  3    fluticasone (FLONASE) 50 MCG/ACT nasal spray 1 spray by Nasal route daily (Patient not taking: Reported on 3/9/2020) 1 Bottle 5     No current facility-administered medications on file prior to visit. Allergies   Allergen Reactions    Doxycycline Other (See Comments)     Heart burn    Prochlorperazine Edisylate      ANXIETY     Health Maintenance   Topic Date Due    Cervical cancer screen  03/11/2022    DTaP/Tdap/Td vaccine (2 - Td) 12/25/2023    Lipid screen  03/14/2024    Flu vaccine  Completed    HIV screen  Completed    Hepatitis A vaccine  Aged Out    Hepatitis B vaccine  Aged Out    Hib vaccine  Aged Out    Meningococcal (ACWY) vaccine  Aged Out    Pneumococcal 0-64 years Vaccine  Aged Out       Review of Systems     Review of Systems   Constitutional: Negative for activity change, appetite change, fatigue and fever. HENT: Negative for congestion and rhinorrhea. Eyes: Negative. Respiratory: Negative. Negative for cough and chest tightness. Cardiovascular: Negative. Gastrointestinal: Negative. Endocrine: Negative. Genitourinary: Negative. Musculoskeletal: Negative. Skin: Negative. Neurological: Negative for dizziness, light-headedness and numbness. Hematological: Negative. Psychiatric/Behavioral: Negative. Physical Exam  There were no vitals filed for this visit. Appears in no apparent distress  Physical Exam    Assessment   Diagnosis Orders   1. Anxiety  ALPRAZolam (XANAX) 1 MG tablet   2. Other migraine without status migrainosus, not intractable  oxyCODONE-acetaminophen (PERCOCET) 5-325 MG per tablet     Problem List     Anxiety    Relevant Medications    traZODone (DESYREL) 50 MG tablet    ALPRAZolam (XANAX) 1 MG tablet    buPROPion (WELLBUTRIN XL) 300 MG extended release tablet    ALPRAZolam (XANAX) 1 MG tablet    Migraine    Relevant Medications    traZODone (DESYREL) 50 MG tablet    topiramate (TOPAMAX) 50 MG tablet    buPROPion (WELLBUTRIN XL) 300 MG extended release tablet    oxyCODONE-acetaminophen (PERCOCET) 5-325 MG per tablet      Rafe Bamberger is a 52 y.o. female evaluated via telephone on 4/2/2020. Consent:  She and/or health care decision maker is aware that that she may receive a bill for this telephone service, depending on her insurance coverage, and has provided verbal consent to proceed: Yes      Documentation:  I communicated with the patient and/or health care decision maker about see above  . Details of this discussion including any medical advice provided: see below      I affirm this is a Patient Initiated Episode with an Established Patient who has not had a related appointment within my department in the past 7 days or scheduled within the next 24 hours.     Total Time: minutes: 11-20 minutes    Note: not billable if this call serves to triage the patient into an appointment for the relevant concern      Albert Carroll       Plan  No orders of the defined types were placed in this encounter. Orders Placed This Encounter   Medications    ALPRAZolam (XANAX) 1 MG tablet     Sig: TAKE ONE TABLET BY MOUTH TWICE DAILY AS NEEDED FOR SLEEP OR ANXIETY FOR UP TO 30 DAYS     Dispense:  60 tablet     Refill:  1    oxyCODONE-acetaminophen (PERCOCET) 5-325 MG per tablet     Sig: Take 1 tablet by mouth daily as needed (migraine) for up to 30 days. Take one as needed for migraine. Dispense:  30 tablet     Refill:  0     Reduce doses taken as pain becomes manageable     No follow-ups on file.   Noris Quintana MD

## 2020-04-20 RX ORDER — BUPROPION HYDROCHLORIDE 300 MG/1
TABLET ORAL
Qty: 90 TABLET | Refills: 3 | Status: SHIPPED | OUTPATIENT
Start: 2020-04-20 | End: 2021-03-23

## 2020-06-22 RX ORDER — VALACYCLOVIR HYDROCHLORIDE 500 MG/1
TABLET, FILM COATED ORAL
Qty: 21 TABLET | Refills: 3 | Status: SHIPPED | OUTPATIENT
Start: 2020-06-22

## 2020-12-04 RX ORDER — ALPRAZOLAM 1 MG/1
1 TABLET ORAL NIGHTLY PRN
Qty: 60 TABLET | Refills: 2 | Status: SHIPPED | OUTPATIENT
Start: 2020-12-04 | End: 2021-01-03

## 2021-01-22 RX ORDER — DEXLANSOPRAZOLE 60 MG/1
60 CAPSULE, DELAYED RELEASE ORAL DAILY
Qty: 90 CAPSULE | Refills: 3 | Status: SHIPPED | OUTPATIENT
Start: 2021-01-22 | End: 2021-01-25 | Stop reason: SDUPTHER

## 2021-01-25 RX ORDER — DEXLANSOPRAZOLE 60 MG/1
60 CAPSULE, DELAYED RELEASE ORAL DAILY
Qty: 90 CAPSULE | Refills: 3 | Status: SHIPPED | OUTPATIENT
Start: 2021-01-25

## 2021-03-22 ENCOUNTER — OFFICE VISIT (OUTPATIENT)
Dept: OBGYN CLINIC | Age: 51
End: 2021-03-22
Payer: COMMERCIAL

## 2021-03-22 ENCOUNTER — TELEPHONE (OUTPATIENT)
Dept: OBGYN CLINIC | Age: 51
End: 2021-03-22

## 2021-03-22 VITALS
WEIGHT: 164 LBS | SYSTOLIC BLOOD PRESSURE: 124 MMHG | BODY MASS INDEX: 29.06 KG/M2 | DIASTOLIC BLOOD PRESSURE: 72 MMHG | HEIGHT: 63 IN

## 2021-03-22 DIAGNOSIS — R10.2 PELVIC CRAMPING: ICD-10-CM

## 2021-03-22 DIAGNOSIS — Z11.51 SCREENING FOR HUMAN PAPILLOMAVIRUS: ICD-10-CM

## 2021-03-22 DIAGNOSIS — Z12.31 SCREENING MAMMOGRAM, ENCOUNTER FOR: ICD-10-CM

## 2021-03-22 DIAGNOSIS — Z01.419 WOMEN'S ANNUAL ROUTINE GYNECOLOGICAL EXAMINATION: Primary | ICD-10-CM

## 2021-03-22 PROCEDURE — 99396 PREV VISIT EST AGE 40-64: CPT | Performed by: OBSTETRICS & GYNECOLOGY

## 2021-03-22 PROCEDURE — 99212 OFFICE O/P EST SF 10 MIN: CPT | Performed by: OBSTETRICS & GYNECOLOGY

## 2021-03-22 ASSESSMENT — ENCOUNTER SYMPTOMS
RESPIRATORY NEGATIVE: 1
ANAL BLEEDING: 0
VOMITING: 0
NAUSEA: 0
ABDOMINAL DISTENTION: 0
DIARRHEA: 0
BLOOD IN STOOL: 0
CONSTIPATION: 0
RECTAL PAIN: 0
EYES NEGATIVE: 1
ABDOMINAL PAIN: 0
ALLERGIC/IMMUNOLOGIC NEGATIVE: 1

## 2021-03-22 ASSESSMENT — VISUAL ACUITY: OU: 1

## 2021-03-22 NOTE — TELEPHONE ENCOUNTER
Upon check out pt stated that she was suppose to have a pelvic US ordered. Please look into and order if appropriated.

## 2021-03-22 NOTE — PROGRESS NOTES
Subjective:      Patient ID: Norbert Talbot is a 54 y.o. female    Annual exam.  No GYN complaints. Normal cycles. Pap performed and screening mammogram ordered. STD screening offered. Monthly SBE encouraged. F/U annual or prn. Pt also wished to discuss pelvic cramping  extending her appointment time by 10 minutes. Patient states she has noticed increasing pelvic cramping throughout the month. S/P ablation in past.  No bleeding.  and monogamous. Denies abnormal vaginal discharge. Denies urinary complaints. No constipation or diarrhea. No hormones. States she was previously dx with fibroids and ovarian cysts. Ordered pelvic US. F/U results. Recommended screening colonoscopy. Will discuss further options after US results available. All questions answered. Vitals:  /72   Ht 5' 3\" (1.6 m)   Wt 164 lb (74.4 kg)   BMI 29.05 kg/m²   Past Medical History:   Diagnosis Date    ADD (attention deficit disorder)     Allergic rhinitis     Anxiety     Depression     GERD (gastroesophageal reflux disease)     Herpes simplex virus type 1 (HSV-1) dermatitis     oral only.     Migraines      Past Surgical History:   Procedure Laterality Date    BREAST LUMPECTOMY Left 12/2014    DIL[facility]21/[facility]    TONSILLECTOMY AND ADENOIDECTOMY      UPPER GASTROINTESTINAL ENDOSCOPY  9/9/08    DR Kaitlin Sidhu    UPPER GASTROINTESTINAL ENDOSCOPY  8/27/10    DR Kaitlin Sidhu     Allergies:  Doxycycline and Prochlorperazine edisylate  Current Outpatient Medications   Medication Sig Dispense Refill    dexlansoprazole (DEXILANT) 60 MG CPDR delayed release capsule Take 1 capsule by mouth daily 90 capsule 3    valACYclovir (VALTREX) 500 MG tablet TAKE 2 TABLETS BY MOUTH TWICE DAILY AS NEEDED FOR COLD SORES 21 tablet 3    buPROPion (WELLBUTRIN XL) 300 MG extended release tablet TAKE 1 TABLET BY MOUTH EVERY DAY IN THE MORNING 90 tablet 3    ADDERALL XR 30 MG capsule TK ONE C PO QD IN THE MORNING UP ON AWAKENING      traZODone (DESYREL) 50 MG tablet TK 1 T PO QHS  3    topiramate (TOPAMAX) 50 MG tablet TK 1 T PO QHS  3    fluticasone (FLONASE) 50 MCG/ACT nasal spray 1 spray by Nasal route daily 1 Bottle 5    VALACYCLOVIR HCL PO        No current facility-administered medications for this visit. Social History     Socioeconomic History    Marital status:      Spouse name: Not on file    Number of children: Not on file    Years of education: Not on file    Highest education level: Not on file   Occupational History    Not on file   Social Needs    Financial resource strain: Not on file    Food insecurity     Worry: Not on file     Inability: Not on file    Transportation needs     Medical: Not on file     Non-medical: Not on file   Tobacco Use    Smoking status: Former Smoker    Smokeless tobacco: Never Used   Substance and Sexual Activity    Alcohol use:  Yes     Alcohol/week: 0.0 standard drinks     Comment: occ    Drug use: No    Sexual activity: Yes     Partners: Male   Lifestyle    Physical activity     Days per week: Not on file     Minutes per session: Not on file    Stress: Not on file   Relationships    Social connections     Talks on phone: Not on file     Gets together: Not on file     Attends Holiness service: Not on file     Active member of club or organization: Not on file     Attends meetings of clubs or organizations: Not on file     Relationship status: Not on file    Intimate partner violence     Fear of current or ex partner: Not on file     Emotionally abused: Not on file     Physically abused: Not on file     Forced sexual activity: Not on file   Other Topics Concern    Not on file   Social History Narrative    Not on file     Family History   Problem Relation Age of Onset    No Known Problems Mother     No Known Problems Father     No Known Problems Paternal Grandfather     No Known Problems Paternal Grandmother     No Known Problems Maternal Grandmother     No Known Problems Maternal Grandfather     No Known Problems Brother     No Known Problems Sister     No Known Problems Other     Breast Cancer Neg Hx     Cancer Neg Hx     Colon Cancer Neg Hx     Diabetes Neg Hx     Eclampsia Neg Hx     Hypertension Neg Hx     Ovarian Cancer Neg Hx      Labor Neg Hx     Spont Abortions Neg Hx     Stroke Neg Hx        Review of Systems   Constitutional: Negative. Negative for activity change, appetite change, chills, diaphoresis, fatigue, fever and unexpected weight change. HENT: Negative. Eyes: Negative. Respiratory: Negative. Cardiovascular: Negative. Gastrointestinal: Negative for abdominal distention, abdominal pain, anal bleeding, blood in stool, constipation, diarrhea, nausea, rectal pain and vomiting. Endocrine: Negative. Genitourinary: Positive for pelvic pain (Cramping). Negative for decreased urine volume, difficulty urinating, dyspareunia, dysuria, enuresis, flank pain, frequency, genital sores, hematuria, menstrual problem, urgency, vaginal bleeding, vaginal discharge and vaginal pain. Musculoskeletal: Negative. Skin: Negative. Allergic/Immunologic: Negative. Neurological: Negative. Hematological: Negative. Psychiatric/Behavioral: Negative. Objective:     Physical Exam  Constitutional:       Appearance: She is well-developed. HENT:      Head: Normocephalic. Eyes:      General: Lids are normal. Vision grossly intact. Neck:      Musculoskeletal: Normal range of motion and neck supple. Thyroid: No thyromegaly. Cardiovascular:      Rate and Rhythm: Normal rate and regular rhythm. Heart sounds: Normal heart sounds. Pulmonary:      Effort: Pulmonary effort is normal. No respiratory distress. Breath sounds: Normal breath sounds. No wheezing or rales. Chest:      Chest wall: No tenderness.       Breasts:         Right: Normal. No swelling, bleeding, inverted nipple, mass, nipple discharge, skin change or tenderness. Left: Normal. No swelling, bleeding, inverted nipple, mass, nipple discharge, skin change or tenderness. Abdominal:      General: There is no distension. Palpations: Abdomen is soft. There is no mass. Tenderness: There is no abdominal tenderness. There is no guarding or rebound. Hernia: No hernia is present. There is no hernia in the left inguinal area or right inguinal area. Genitourinary:     General: Normal vulva. Pubic Area: No rash. Labia:         Right: No rash, tenderness, lesion or injury. Left: No rash, tenderness, lesion or injury. Urethra: No prolapse, urethral swelling or urethral lesion. Vagina: Normal. No signs of injury and foreign body. No vaginal discharge, erythema, tenderness or bleeding. Cervix: No cervical motion tenderness, discharge or friability. Uterus: Not deviated, not enlarged, not fixed and not tender. Adnexa:         Right: No mass, tenderness or fullness. Left: No mass, tenderness or fullness. Rectum: Normal.   Musculoskeletal: Normal range of motion. General: No tenderness. Lymphadenopathy:      Cervical: No cervical adenopathy. Upper Body:      Right upper body: No supraclavicular or axillary adenopathy. Left upper body: No supraclavicular or axillary adenopathy. Lower Body: No right inguinal adenopathy. No left inguinal adenopathy. Skin:     General: Skin is warm and dry. Coloration: Skin is not pale. Findings: No erythema or rash. Neurological:      Mental Status: She is alert and oriented to person, place, and time. Psychiatric:         Behavior: Behavior normal.         Thought Content: Thought content normal.         Judgment: Judgment normal.         Assessment:      Diagnosis Orders   1. Women's annual routine gynecological examination  PAP SMEAR   2.  Screening for human papillomavirus  PAP SMEAR   3. Screening mammogram, encounter for  SOCO DIGITAL SCREEN W OR WO CAD BILATERAL   4. Pelvic cramping  US NON OB TRANSVAGINAL    US PELVIS COMPLETE         Plan:      Medications placedthis encounter:  No orders of the defined types were placed in this encounter. Orders placedthis encounter:  Orders Placed This Encounter   Procedures    SOCO DIGITAL SCREEN W OR WO CAD BILATERAL     Standing Status:   Future     Standing Expiration Date:   3/18/2022    US NON OB TRANSVAGINAL     Standing Status:   Future     Standing Expiration Date:   3/22/2022    US PELVIS COMPLETE     Standing Status:   Future     Standing Expiration Date:   3/22/2022    PAP SMEAR     Standing Status:   Future     Standing Expiration Date:   3/18/2022     Order Specific Question:   Collection Type     Answer: Thin Prep     Order Specific Question:   Prior Abnormal Pap Test     Answer:   No     Order Specific Question:   Screening or Diagnostic     Answer:   Screening     Order Specific Question:   HPV Requested? Answer:   Yes     Order Specific Question:   High Risk Patient     Answer:   N/A         Follow up:  Return for Ultrasound, Results Review, Annual.   Repeat Annual GYN exam every 1 year. Cervical Cytology exam starts age 24. If Negative Cytology;  follow-up screening per current guidelines. Mammograms yearly starting at age 36. Calcium and Vitamin D dosing reviewed ( age appropriate ). Colonoscopy and bone density screening discussed ( age appropriate ). Birth control and STD prevention discussed ( age appropriate ). Gardisil counseling completed for all patients ( age appropriate ). Routine health maintenance ( per PCP and guidelines ).

## 2021-03-23 DIAGNOSIS — F41.9 ANXIETY: ICD-10-CM

## 2021-03-23 RX ORDER — BUPROPION HYDROCHLORIDE 300 MG/1
TABLET ORAL
Qty: 90 TABLET | Refills: 3 | Status: SHIPPED | OUTPATIENT
Start: 2021-03-23

## 2021-03-23 NOTE — TELEPHONE ENCOUNTER
Rx requested:  Requested Prescriptions     Pending Prescriptions Disp Refills    buPROPion (WELLBUTRIN XL) 300 MG extended release tablet [Pharmacy Med Name: BUPROPION HCL  MG TABLET] 90 tablet 3     Sig: TAKE 1 TABLET BY MOUTH EVERY DAY IN THE MORNING       Last Office Visit:   4/2/2020        Next Visit Date:  Future Appointments   Date Time Provider Jakub Balbuena   3/28/2022 10:00 AM Anitha Garcia MD [address]

## 2021-03-26 DIAGNOSIS — Z01.419 WOMEN'S ANNUAL ROUTINE GYNECOLOGICAL EXAMINATION: ICD-10-CM

## 2021-03-26 DIAGNOSIS — Z11.51 SCREENING FOR HUMAN PAPILLOMAVIRUS: ICD-10-CM

## 2021-03-26 NOTE — LETTER
[facility]  [address] [facility] [address]  Phone: [phone]  Fax: [phone]    Yuliana Patricia MD        March 30, 2021    [address] [place], [place] [id number]      Dear Emily Andersen: The results of your most recent Pap smear are normal. This means that no cancerous or precancerous cells were seen. We recommend that you come back in 1 year for your next routine Pap smear. If you have any questions or concerns, please don't hesitate to call.     Sincerely,        Yuliana Patricia MD

## 2021-04-19 ENCOUNTER — HOSPITAL ENCOUNTER (OUTPATIENT)
Dept: WOMENS IMAGING | Age: 51
Discharge: HOME | End: 2021-04-21
Payer: COMMERCIAL

## 2021-04-19 ENCOUNTER — TELEPHONE (OUTPATIENT)
Dept: OBGYN CLINIC | Age: 51
End: 2021-04-19

## 2021-04-19 ENCOUNTER — HOSPITAL ENCOUNTER (OUTPATIENT)
Dept: ULTRASOUND IMAGING | Age: 51
Discharge: HOME | End: 2021-04-21
Payer: COMMERCIAL

## 2021-04-19 VITALS — BODY MASS INDEX: 29.05 KG/M2 | HEIGHT: 63 IN

## 2021-04-19 DIAGNOSIS — Z12.31 SCREENING MAMMOGRAM, ENCOUNTER FOR: ICD-10-CM

## 2021-04-19 DIAGNOSIS — R10.2 PELVIC CRAMPING: ICD-10-CM

## 2021-04-19 PROCEDURE — 77063 BREAST TOMOSYNTHESIS BI: CPT

## 2021-04-19 PROCEDURE — 76856 US EXAM PELVIC COMPLETE: CPT

## 2021-04-19 PROCEDURE — 76830 TRANSVAGINAL US NON-OB: CPT

## 2021-04-19 NOTE — TELEPHONE ENCOUNTER
Pt is calling because she has been bleeding 5 days. She had an ablation 5 years ago. She is worried please advise.

## 2021-04-22 ENCOUNTER — OFFICE VISIT (OUTPATIENT)
Dept: OBGYN CLINIC | Age: 51
End: 2021-04-22
Payer: COMMERCIAL

## 2021-04-22 VITALS
WEIGHT: 165 LBS | SYSTOLIC BLOOD PRESSURE: 120 MMHG | BODY MASS INDEX: 29.23 KG/M2 | DIASTOLIC BLOOD PRESSURE: 70 MMHG | HEIGHT: 63 IN

## 2021-04-22 DIAGNOSIS — Z09 FOLLOW UP: Primary | ICD-10-CM

## 2021-04-22 DIAGNOSIS — N93.9 ABNORMAL UTERINE BLEEDING (AUB): ICD-10-CM

## 2021-04-22 DIAGNOSIS — R10.2 PELVIC CRAMPING: ICD-10-CM

## 2021-04-22 LAB
ANISOCYTOSIS: ABNORMAL
APTT: 28 SEC (ref 24.4–36.8)
BASOPHILS ABSOLUTE: 0 K/UL (ref 0–0.2)
BASOPHILS RELATIVE PERCENT: 0.9 %
BILIRUBIN, POC: NORMAL
BLOOD URINE, POC: NORMAL
CLARITY, POC: NORMAL
COLOR, POC: NORMAL
EOSINOPHILS ABSOLUTE: 0.1 K/UL (ref 0–0.7)
EOSINOPHILS RELATIVE PERCENT: 3 %
FOLLICLE STIMULATING HORMONE: 120.8 MIU/ML
GLUCOSE URINE, POC: NORMAL
GONADOTROPIN, CHORIONIC (HCG) QUANT: 4.8 MIU/ML
HCT VFR BLD CALC: 39.5 % (ref 37–47)
HEMOGLOBIN: 13.4 G/DL (ref 12–16)
INR BLD: 0.9
KETONES, POC: NORMAL
LEUKOCYTE EST, POC: NORMAL
LUTEINIZING HORMONE: 71.2 MIU/ML
LYMPHOCYTES ABSOLUTE: 1.5 K/UL (ref 1–4.8)
LYMPHOCYTES RELATIVE PERCENT: 31 %
MCH RBC QN AUTO: 31.5 PG (ref 27–31.3)
MCHC RBC AUTO-ENTMCNC: 33.9 % (ref 33–37)
MCV RBC AUTO: 92.9 FL (ref 82–100)
MICROCYTES: ABNORMAL
MONOCYTES ABSOLUTE: 0.1 K/UL (ref 0.2–0.8)
MONOCYTES RELATIVE PERCENT: 2.9 %
MYELOCYTE PERCENT: 1 %
NEUTROPHILS ABSOLUTE: 3 K/UL (ref 1.4–6.5)
NEUTROPHILS RELATIVE PERCENT: 62 %
NITRITE, POC: NORMAL
PDW BLD-RTO: 13.7 % (ref 11.5–14.5)
PH, POC: 7
PLATELET # BLD: 248 K/UL (ref 130–400)
PLATELET SLIDE REVIEW: NORMAL
PROTEIN, POC: NORMAL
PROTHROMBIN TIME: 12.7 SEC (ref 12.3–14.9)
RBC # BLD: 4.25 M/UL (ref 4.2–5.4)
SPECIFIC GRAVITY, POC: 1.02
STOMATOCYTES: ABNORMAL
TSH REFLEX: 1.23 UIU/ML (ref 0.44–3.86)
UROBILINOGEN, POC: NORMAL
WBC # BLD: 4.7 K/UL (ref 4.8–10.8)

## 2021-04-22 PROCEDURE — 81002 URINALYSIS NONAUTO W/O SCOPE: CPT | Performed by: OBSTETRICS & GYNECOLOGY

## 2021-04-22 PROCEDURE — 99213 OFFICE O/P EST LOW 20 MIN: CPT | Performed by: OBSTETRICS & GYNECOLOGY

## 2021-04-22 ASSESSMENT — ENCOUNTER SYMPTOMS
EYES NEGATIVE: 1
ANAL BLEEDING: 0
CONSTIPATION: 0
RECTAL PAIN: 0
DIARRHEA: 0
ABDOMINAL DISTENTION: 0
ALLERGIC/IMMUNOLOGIC NEGATIVE: 1
VOMITING: 0
RESPIRATORY NEGATIVE: 1
ABDOMINAL PAIN: 0
NAUSEA: 0
BLOOD IN STOOL: 0

## 2021-04-22 NOTE — PATIENT INSTRUCTIONS
EMBX---      You have been scheduled for an Endometrial Biopsy (EMBX). An EMBX is a procedure that involves using a soft, straw-like device to suction a small sample of lining from the uterus. An EMBX is done to find the cause of heavy, prolonged, or irregular uterine bleeding. It is also done to find the cause of uterine bleeding in women who have gone through menopause. Do I need anything prior to my EMBX? 1. No unprotected intercourse for 3-4 weeks prior to procedure  2. Take 800mg of Motrin 1 hour prior to your procedure  3. If you start your period you can still have the procedure if you are having light bleeding such as the first or last day of your cycle. What can I expect when I go home? 1. You may have pink or red tinged discharge after the procedure for up to 24 hours. 2.  This procedure may cause your period to start. 3.  If you have heavy bleeding (filling an overnight pad in 1 hour or less for 3 hrs), a fever of 100 degrees (or higher), or abdominal pain that is debilitating, please call the office immediately at [phone]. ENDOSEE - OFFICE HYSTEROSCOPY    Endosee Office Hysteroscopy is a diagnostic tool that lets your doctor see the inside of your uterus quickly and easily right in her office, without the need for general anesthesia in an operating room. Eric Jimenez helps find the cause of several common issues women face such as abnormal bleeding and infertility concerns. Before using Endosee, your doctor will need to look at your health history, but most women are able to have the procedure without a problem. Do I need to do anything prior to my Endosee? 1. Ultrasound of pelvis complete and Ultrasound non-ob transvaginal to be done before the Endosee. Ultrasounds will be scheduled at check-out along with Endosee and follow-up office visit for results 2 weeks after Endosee. 2.  No unprotected intercourse for 3 weeks prior to procedure  3.   Take 800mg of Motrin 1 hour prior to your procedure  4. If you start your period, you can still have the Endosee done if your bleeding is very light. If your cycle is heavy please call to reschedule. 5.  Avoid vaginal medications or creams & douching for 24 hours before the procedure. 6.  Please come prepared to leave a urine sample prior to your procedure. What can I expect when I go home? 1. You may have some spotting or light discharge for up to 24 hours. 2.  You can resume all normal activities. 3.  The procedure may start your period. *If a biopsy is done, you will need to schedule a follow up appointment for 2 weeks following your procedure to get your results from the physician.

## 2021-04-22 NOTE — PROGRESS NOTES
 Anxiety     Depression     GERD (gastroesophageal reflux disease)     Herpes simplex virus type 1 (HSV-1) dermatitis     oral only.  Migraines      Past Surgical History:   Procedure Laterality Date    BREAST BIOPSY      BREAST LUMPECTOMY Left 12/2014    DILATION AND CURETT[facility] UTERUS  09/21/2016     [facility]    TONSILLECTOMY AND ADENOIDECTOMY      UPPER GASTROINTESTINAL ENDOSCOPY  9/9/08    DR Ashley Frances    UPPER GASTROINTESTINAL ENDOSCOPY  8/27/10    DR Ashley Frances     Allergies:  Doxycycline and Prochlorperazine edisylate  Current Outpatient Medications   Medication Sig Dispense Refill    buPROPion (WELLBUTRIN XL) 300 MG extended release tablet TAKE 1 TABLET BY MOUTH EVERY DAY IN THE MORNING 90 tablet 3    dexlansoprazole (DEXILANT) 60 MG CPDR delayed release capsule Take 1 capsule by mouth daily 90 capsule 3    valACYclovir (VALTREX) 500 MG tablet TAKE 2 TABLETS BY MOUTH TWICE DAILY AS NEEDED FOR COLD SORES 21 tablet 3    ADDERALL XR 30 MG capsule TK ONE C PO QD IN THE MORNING UP ON AWAKENING      VALACYCLOVIR HCL PO       traZODone (DESYREL) 50 MG tablet TK 1 T PO QHS  3    topiramate (TOPAMAX) 50 MG tablet TK 1 T PO QHS  3    fluticasone (FLONASE) 50 MCG/ACT nasal spray 1 spray by Nasal route daily 1 Bottle 5     No current facility-administered medications for this visit. Social History     Socioeconomic History    Marital status:      Spouse name: Not on file    Number of children: Not on file    Years of education: Not on file    Highest education level: Not on file   Occupational History    Not on file   Social Needs    Financial resource strain: Not on file    Food insecurity     Worry: Not on file     Inability: Not on file    Transportation needs     Medical: Not on file     Non-medical: Not on file   Tobacco Use    Smoking status: Former Smoker    Smokeless tobacco: Never Used   Substance and Sexual Activity    Alcohol use:  Yes     Alcohol/week: 0.0 standard flank pain, frequency, genital sores, hematuria, menstrual problem, pelvic pain (Occasional cramping), urgency, vaginal bleeding, vaginal discharge and vaginal pain. Musculoskeletal: Negative. Skin: Negative. Allergic/Immunologic: Negative. Neurological: Negative. Hematological: Negative. Psychiatric/Behavioral: Negative. Objective:     Physical Exam  Constitutional:       General: She is not in acute distress. Appearance: She is well-developed. She is not diaphoretic. HENT:      Head: Normocephalic and atraumatic. Eyes:      Conjunctiva/sclera: Conjunctivae normal.   Neck:      Musculoskeletal: Normal range of motion and neck supple. Cardiovascular:      Rate and Rhythm: Normal rate and regular rhythm. Pulmonary:      Effort: Pulmonary effort is normal. No respiratory distress. Musculoskeletal: Normal range of motion. General: No tenderness or deformity. Skin:     General: Skin is warm and dry. Coloration: Skin is not pale. Neurological:      Mental Status: She is alert and oriented to person, place, and time. Motor: No abnormal muscle tone. Coordination: Coordination normal.   Psychiatric:         Behavior: Behavior normal.         Thought Content: Thought content normal.         Judgment: Judgment normal.         Assessment:          Diagnosis Orders   1. Follow up     2. Pelvic cramping     3. Abnormal uterine bleeding (AUB)          Plan:      Medications placedthis encounter:  No orders of the defined types were placed in this encounter. Orders placedthis encounter:  No orders of the defined types were placed in this encounter. Follow up:  Return for Endometrial Biopsy, Results Review.

## 2021-05-12 ENCOUNTER — PROCEDURE VISIT (OUTPATIENT)
Dept: OBGYN CLINIC | Age: 51
End: 2021-05-12
Payer: COMMERCIAL

## 2021-05-12 VITALS
SYSTOLIC BLOOD PRESSURE: 102 MMHG | WEIGHT: 164 LBS | HEIGHT: 63 IN | DIASTOLIC BLOOD PRESSURE: 60 MMHG | BODY MASS INDEX: 29.06 KG/M2

## 2021-05-12 DIAGNOSIS — Z32.02 URINE PREGNANCY TEST NEGATIVE: ICD-10-CM

## 2021-05-12 DIAGNOSIS — N93.9 ABNORMAL UTERINE BLEEDING (AUB): Primary | ICD-10-CM

## 2021-05-12 LAB
HCG, URINE, POC: NEGATIVE
Lab: NORMAL
NEGATIVE QC PASS/FAIL: NORMAL
POSITIVE QC PASS/FAIL: NORMAL

## 2021-05-12 PROCEDURE — 81025 URINE PREGNANCY TEST: CPT | Performed by: OBSTETRICS & GYNECOLOGY

## 2021-05-12 PROCEDURE — 99213 OFFICE O/P EST LOW 20 MIN: CPT | Performed by: OBSTETRICS & GYNECOLOGY

## 2021-05-12 ASSESSMENT — ENCOUNTER SYMPTOMS
RECTAL PAIN: 0
VOMITING: 0
NAUSEA: 0
DIARRHEA: 0
BLOOD IN STOOL: 0
RESPIRATORY NEGATIVE: 1
ABDOMINAL PAIN: 0
ANAL BLEEDING: 0
ABDOMINAL DISTENTION: 0
CONSTIPATION: 0
EYES NEGATIVE: 1
ALLERGIC/IMMUNOLOGIC NEGATIVE: 1

## 2021-05-12 NOTE — PATIENT INSTRUCTIONS
Patient Education        Learning About Hysterectomy Surgery  What is a hysterectomy? A hysterectomy is surgery to take out the uterus. Sometimes the ovaries and fallopian tubes also are removed at the same time. How is this surgery done? There are many ways to do the surgery. The type you have depends on your medical condition. It also depends on your overall health. Talk with your doctor about which type is right for you. Abdominal surgery  This is done through a cut that the doctor makes in the lower belly. The cut is called an incision. It may be across the bikini line or straight up and down. The doctor takes out the uterus and the cervix. This procedure is most often done when the woman might have cancer. It's also often done when the uterus is hard to remove. This may be because of severe endometriosis, a lot of scar tissue (adhesions), or a very large uterus. Vaginal surgery  This is done through the vagina. The doctor makes a small cut in the vagina instead of the belly. This method isn't used when there's a chance that cancer may be in the uterus, cervix, or ovaries. It's used when the uterus is normal in size and easy to remove. Laparoscopic surgery  The doctor puts a lighted tube (laparoscope) through small cuts in the belly. The doctor can see your organs with the scope. The doctor can insert surgical tools to cut the tissue that holds your uterus in place. Then the uterus is removed. It may be removed through small cuts in the belly. (This is called laparoscopic abdominal hysterectomy.) Or it may be removed through the vagina. (This is called laparoscopically assisted vaginal hysterectomy.)  What can you expect after your surgery? Recovery can take from 4 to 6 weeks. It depends on which type of surgery you have. You will need help around the house while you get better. You won't be able to do any heavy lifting. And you will have to take it easy for a few weeks.  It's common to feel more tired professional. [address] any warranty or liability for your use of this information. Patient Education        Laparoscopic Hysterectomy: Before Your Surgery  What is a laparoscopic hysterectomy? A hysterectomy is surgery to take out the uterus. In some cases, the ovaries and fallopian tubes also are taken out at the same time. The doctor makes one or more small cuts in the belly. These cuts are called incisions. They let the doctor insert tools to do the surgery. One of these tools is a tube with a light on it. It's called a laparoscope, or scope. The scope and the other tools allow the doctor to free the uterus. The doctor then removes the uterus through the small cuts. In a total hysterectomy, the doctor takes out the uterus and the cervix. In a supracervical hysterectomy, only the uterus is taken out. Most women go home in 1 to 2 days. You may need about 4 to 6 weeks to fully recover. After the surgery, you will not have periods. You will not be able to get pregnant. If there is a chance that you will want to have a baby, talk to your doctor about other treatment options. Your doctor may advise you to take hormone pills if your ovaries are removed. Your doctor will talk to you about the risks and benefits of hormones. He or she will also tell you how long to take them. This surgery probably won't lower your interest in sex. In fact, some women enjoy sex more. This may be because they no longer have to worry about birth control or heavy bleeding. Follow-up care is a key part of your treatment and safety. Be sure to make and go to all appointments, and call your doctor if you are having problems. It's also a good idea to know your test results and keep a list of the medicines you take. How do you prepare for surgery? Surgery can be stressful. This information will help you understand what you can expect. And it will help you safely prepare for surgery.   Preparing for surgery    · Be sure you have someone to take you home. Anesthesia and pain medicine will make it unsafe for you to drive or get home on your own.     · Understand exactly what surgery is planned, along with the risks, benefits, and other options.     · If you take aspirin or some other blood thinner, ask your doctor if you should stop taking it before your surgery. Make sure that you understand exactly what your doctor wants you to do. These medicines increase the risk of bleeding.     · Tell your doctor ALL the medicines, vitamins, supplements, and herbal remedies you take. Some may increase the risk of problems during your surgery. Your doctor will tell you if you should stop taking any of them before the surgery and how soon to do it.     · Make sure your doctor and the hospital have a copy of your advance directive. If you don't have one, you may want to prepare one. It lets others know your health care wishes. It's a good thing to have before any type of surgery or procedure. What happens on the day of surgery? · Follow the instructions exactly about when to stop eating and drinking. If you don't, your surgery may be canceled. If your doctor told you to take your medicines on the day of surgery, take them with only a sip of water.     · Take a bath or shower before you come in for your surgery. Do not apply lotions, perfumes, deodorants, or nail polish.     · Do not shave the surgical site yourself.     · Take off all jewelry and piercings. And take out contact lenses, if you wear them. At the hospital or surgery center   · Bring a picture ID.     · The area for surgery is often marked to make sure there are no errors.     · You will be kept comfortable and safe by your anesthesia provider. You will be asleep during the surgery.     · The surgery will take about 2 to 4 hours. When should you call your doctor?    · You have questions or concerns.     · You don't understand how to prepare for your surgery.     · You become ill before the surgery (such as fever, flu, or a cold).     · You need to reschedule or have changed your mind about having the surgery. Where can you learn more? Go to [email or web address] and sign in to your [employer] account. Enter D130 in the [facility] box to learn more about \"Laparoscopic Hysterectomy: Before Your Surgery. \"     If you do not have an account, please click on the \"Sign Up Now\" link. Current as of: July 17, 2020               Content Version: 12.8  © 2006-2021 [employer]. Care instructions adapted under license by [facility] ([facility]). If you have questions about a medical condition or this instruction, always ask your healthcare professional. [address] any warranty or liability for your use of this information. Patient Education        Laparoscopic Hysterectomy: What to Expect at Home  Your Recovery     A laparoscopic hysterectomy is surgery to take out the uterus. Your doctor put a lighted tube and surgical tools through small cuts in your belly to remove the uterus. You can expect to feel better and stronger each day. But you might need pain medicine for a week or two. You may get tired easily or have less energy than usual. The tiredness may last for several weeks after surgery. You will probably notice that your belly is swollen and puffy. This is common. The swelling will take several weeks to go down. You may take about 4 to 6 weeks to fully recover. It's important to avoid lifting while you are recovering so that you can heal.  This care sheet gives you a general idea about how long it will take for you to recover. But each person recovers at a different pace. Follow the steps below to get better as quickly as possible. How can you care for yourself at home? Activity    · Rest when you feel tired.     · Be active.  Walking is a good choice.     · Allow the area to heal. Don't move quickly or lift anything heavy until you are feeling better.     · You may shower 24 to 48 hours after surgery, if your doctor okays it. Pat the incision dry. Do not take a bath for the first 2 weeks, or until your doctor tells you it is okay.     · Ask your doctor when it is okay for you to have sex. Diet    · You can eat your normal diet. If your stomach is upset, try bland, low-fat foods like plain rice, broiled chicken, toast, and yogurt.     · If your bowel movements are not regular right after surgery, try to avoid constipation and straining. Drink plenty of water. Your doctor may suggest fiber, a stool softener, or a mild laxative. Medicines    · Your doctor will tell you if and when you can restart your medicines. He or she will also give you instructions about taking any new medicines.     · If you take aspirin or some other blood thinner, ask your doctor if and when to start taking it again. Make sure that you understand exactly what your doctor wants you to do.     · Be safe with medicines. Read and follow all instructions on the label. ? If the doctor gave you a prescription medicine for pain, take it as prescribed. ? If you are not taking a prescription pain medicine, ask your doctor if you can take an over-the-counter medicine.     · If your doctor prescribed antibiotics, take them as directed. Do not stop taking them just because you feel better. You need to take the full course of antibiotics. Incision care    · You may have stitches over the cuts (incisions) the doctor made in your belly.     · If you have strips of tape on the cut (incision) the doctor made, leave the tape on for a week or until it falls off.     · Wash the area daily with warm, soapy water, and pat it dry. Don't use hydrogen peroxide or alcohol. They can slow healing.     · You may cover the area with a gauze bandage if it oozes fluid or rubs against clothing.     · Change the bandage every day.    Other instructions    · Now\" link. Current as of: July 17, 2020               Content Version: 12.8  © 2006-2021 [employer]. Care instructions adapted under license by [facility] ([facility]). If you have questions about a medical condition or this instruction, always ask your healthcare professional. [address] any warranty or liability for your use of this information. Patient Education        Vaginal Hysterectomy: Before Your Surgery  What is a vaginal hysterectomy? Vaginal hysterectomy is surgery to remove the uterus. It is done through a cut (incision) in the vagina. The cervix is often taken out at the same time. The doctor may also take out your ovaries and fallopian tubes. After the surgery, you will no longer have periods. You also will not be able to get pregnant. Your doctor may advise you to take hormone pills if your ovaries were removed. Your doctor will talk to you about the risks and benefits of hormones. He or she will also tell you how long to take them. This surgery probably won't lower your interest in sex. In fact, some women enjoy sex more. This may be because they no longer have to worry about birth control or heavy bleeding. Some women go home the day of surgery and some will stay in the hospital 1 to 2 days after surgery. You may feel better each day. But it may take 4 to 6 weeks to fully recover. Follow-up care is a key part of your treatment and safety. Be sure to make and go to all appointments, and call your doctor if you are having problems. It's also a good idea to know your test results and keep a list of the medicines you take. How do you prepare for surgery? Surgery can be stressful. This information will help you understand what you can expect. And it will help you safely prepare for surgery. Preparing for surgery    · Be sure you have someone to take you home.  Anesthesia and pain medicine will make it unsafe for you to drive or get home on your own.     · or have changed your mind about having the surgery. Where can you learn more? Go to [email or web address] and sign in to your [employer] account. Enter A511 in the [facility] box to learn more about \"Vaginal Hysterectomy: Before Your Surgery. \"     If you do not have an account, please click on the \"Sign Up Now\" link. Current as of: July 17, 2020               Content Version: 12.8  © 2006-2021 [employer]. Care instructions adapted under license by [facility] ([facility]). If you have questions about a medical condition or this instruction, always ask your healthcare professional. [place] [address] any warranty or liability for your use of this information. Patient Education        Vaginal Hysterectomy: What to Expect at Home  Your Recovery     A vaginal hysterectomy removes the uterus through the vagina. Your doctor made a cut (incision) in your vagina and removed the uterus. You can expect to feel better and stronger each day. But you might need pain medicine for a week or two. You may get tired easily or have less energy than usual. This may last for several weeks after surgery. You will probably notice that your belly is swollen and puffy. This is common. The swelling will take several weeks to go down. You may take 4 to 6 weeks to fully recover. It's important to avoid lifting while you are recovering so that you can heal.  This care sheet gives you a general idea about how long it will take for you to recover. But each person recovers at a different pace. Follow the steps below to get better as quickly as possible. How can you care for yourself at home? Activity    · Rest when you feel tired. Getting enough sleep will help you recover.     · Try to walk each day. Start by walking a little more than you did the day before. Bit by bit, increase the amount you walk.  Walking boosts blood flow and helps prevent pneumonia and constipation.     · Avoid lifting anything that would make you strain. This may include a child, heavy grocery bags and milk containers, a heavy briefcase or backpack, cat litter or dog food bags, or a vacuum .     · Avoid strenuous activities, such as biking, jogging, weight lifting, or aerobic exercise, until your doctor says it is okay.     · You may shower. If you have incisions in your belly, pat them dry when you are done. Do not take a bath for the first 2 weeks or until your doctor tells you it is okay.     · Ask your doctor when you can drive again.     · You will probably need to take 2 to 4 weeks off from work. It depends on the type of work you do and how you feel.     · Your doctor will tell you when you can have sex again. Diet    · You can eat your normal diet. If your stomach is upset, try bland, low-fat foods like plain rice, broiled chicken, toast, and yogurt.     · Drink plenty of fluids (unless your doctor tells you not to).     · You may notice that your bowel movements are not regular right after your surgery. This is common. Try to avoid constipation and straining with bowel movements. You may want to take a fiber supplement every day. If you have not had a bowel movement after a couple of days, ask your doctor about taking a mild laxative. Medicines    · Your doctor will tell you if and when you can restart your medicines. He or she will also give you instructions about taking any new medicines.     · If you take aspirin or some other blood thinner, ask your doctor if and when to start taking it again. Make sure that you understand exactly what your doctor wants you to do.     · Take pain medicines exactly as directed. ? If the doctor gave you a prescription medicine for pain, take it as prescribed. ? If you are not taking a prescription pain medicine, ask your doctor if you can take an over-the-counter medicine.     · If your doctor prescribed antibiotics, take them as directed.  Do not stop taking them just because you feel better. You need to take the full course of antibiotics.     · If you think your pain medicine is making you sick to your stomach:  ? Take your medicine after meals (unless your doctor tells you not to). ? Ask your doctor for a different pain medicine. Incision care    · If you had surgery with a laparoscope, you may have stitches over the cuts (incisions) the doctor made in your belly. If you have strips of tape over the incisions, leave the tape on for a week or until it falls off. Or follow your doctor's instructions for removing the tape.     · Wash the area daily with warm, soapy water and pat it dry. Don't use hydrogen peroxide or alcohol, which can slow healing. You may cover the area with a gauze bandage if it weeps or rubs against clothing. Change the bandage every day.     · Keep the area clean and dry. Other instructions    · You may have some light vaginal bleeding. Wear sanitary pads if needed. Do not douche or use tampons. Follow-up care is a key part of your treatment and safety. Be sure to make and go to all appointments, and call your doctor if you are having problems. It's also a good idea to know your test results and keep a list of the medicines you take. When should you call for help? Call 911 anytime you think you may need emergency care. For example, call if:    · You passed out (lost consciousness).     · You have chest pain, are short of breath, or cough up blood.    Call your doctor now or seek immediate medical care if:    · You have bright red vaginal bleeding that soaks one or more pads in an hour, or you have large clots.     · You have vaginal discharge that has increased in amount or smells bad.     · You are sick to your stomach or cannot drink fluids.     · You have pain that does not get better after you take pain medicine.     · You have loose stitches, or your incision comes open.     · You have signs of infection, such as:  ? Increased pain, have periods. You won't be able to get pregnant. If there's a chance that you want to have a baby, talk to your doctor about treatment options. Some women go home the day of surgery. Others will stay in the hospital 1 to 2 days after surgery. You will need about 4 to 6 weeks to fully recover. The recovery time may be shorter for some people. Follow-up care is a key part of your treatment and safety. Be sure to make and go to all appointments, and call your doctor if you are having problems. It's also a good idea to know your test results and keep a list of the medicines you take. How do you prepare for surgery? Surgery can be stressful. This information will help you understand what you can expect. And it will help you safely prepare for surgery. Preparing for surgery    · Be sure you have someone to take you home. Anesthesia and pain medicine will make it unsafe for you to drive or get home on your own.     · Understand exactly what surgery is planned, along with the risks, benefits, and other options.     · If you take aspirin or some other blood thinner, ask your doctor if you should stop taking it before your surgery. Make sure that you understand exactly what your doctor wants you to do. These medicines increase the risk of bleeding.     · Tell your doctor ALL the medicines, vitamins, supplements, and herbal remedies you take. Some may increase the risk of problems during your surgery. Your doctor will tell you if you should stop taking any of them before the surgery and how soon to do it.     · Make sure your doctor and the hospital have a copy of your advance directive. If you don't have one, you may want to prepare one. It lets others know your health care wishes. It's a good thing to have before any type of surgery or procedure. What happens on the day of surgery? · Follow the instructions exactly about when to stop eating and drinking. If you don't, your surgery may be canceled.  If your doctor told you to take your medicines on the day of surgery, please take them with only a sip of water.     · Take a bath or shower before you come in for your surgery. Do not apply lotions, perfumes, deodorants, or nail polish.     · Do not shave the surgical site yourself.     · Take off all jewelry and piercings. And take out contact lenses, if you wear them. At the hospital or surgery center   · Bring a picture ID.     · You will be kept comfortable and safe by your anesthesia provider. You will be asleep during the surgery.     · The surgery will take about 2 to 4 hours. When should you call your doctor? · You have questions or concerns.     · You don't understand how to prepare for your surgery.     · You become ill before the surgery (such as fever, flu, or a cold).     · You need to reschedule or have changed your mind about having the surgery. Where can you learn more? Go to [email or web address]://Water [email or web address].[email or web address] org and sign in to your [employer] account. Enter D669 in the [employer] box to learn more about \"Laparoscopically Assisted Vaginal Hysterectomy: Before Your Surgery. \"     If you do not have an account, please click on the \"Sign Up Now\" link. Current as of: July 17, 2020               Content Version: 12.8  © 2006-2021 [employer]. Care instructions adapted under license by [facility] ([facility]). If you have questions about a medical condition or this instruction, always ask your healthcare professional. [place] [address] any warranty or liability for your use of this information. Patient Education        Laparoscopically Assisted Vaginal Hysterectomy: What to Expect at Home  Your Recovery     Laparoscopically assisted vaginal hysterectomy (LAVH) removes the uterus through the vagina. Your doctor used a lighted tube and surgical tools inserted through small cuts (incisions) in the belly. Then the doctor made a small cut in the vagina.  Your uterus was taken out through this cut. You can expect to feel better and stronger each day. But you might need pain medicine for a week or two. You may get tired easily or have less energy than usual. This may last for several weeks after surgery. You will probably notice that your belly is swollen and puffy. This is common. The swelling will take several weeks to go down. It may take about 4 to 6 weeks to fully recover. The recovery time may be shorter for some people. You may have some light vaginal bleeding. Don't have sex until the doctor says it's okay. Don't douche or put anything into your vagina, such as a tampon, until your doctor says it's okay. It's important to avoid lifting while you are recovering so that you can heal.  This care sheet gives you a general idea about how long it will take for you to recover. But each person recovers at a different pace. Follow the steps below to get better as quickly as possible. How can you care for yourself at home? Activity    · Rest when you feel tired. Getting enough sleep will help you recover.     · Try to walk each day. Start by walking a little more than you did the day before. Bit by bit, increase the amount you walk. Walking boosts blood flow and helps prevent pneumonia and constipation.     · Avoid lifting anything that would make you strain. This may include heavy grocery bags and milk containers, a heavy briefcase or backpack, cat litter or dog food bags, a vacuum , or a child.     · Avoid strenuous activities, such as biking, jogging, weight lifting, or aerobic exercise, until your doctor says it is okay.     · You may shower. Pat the cut (incision) dry. Do not take a bath for the first 2 weeks, or until your doctor tells you it is okay.     · Ask your doctor when you can drive again.     · You will probably need to take about 2 weeks off from work.  It depends on the type of work you do and how you feel.     · Your doctor will tell you when you can have cover the area with a gauze bandage if it weeps or rubs against clothing. Change the bandage every day.     · Keep the area clean and dry. Other instructions    · You may have some light vaginal bleeding. Wear sanitary pads if needed. Do not douche or use tampons. Follow-up care is a key part of your treatment and safety. Be sure to make and go to all appointments, and call your doctor if you are having problems. It's also a good idea to know your test results and keep a list of the medicines you take. When should you call for help? Call 911 anytime you think you may need emergency care. For example, call if:    · You passed out (lost consciousness).     · You have chest pain, are short of breath, or cough up blood. Call your doctor now or seek immediate medical care if:    · You have pain that does not get better after you take pain medicine.     · You cannot pass stools or gas.     · You have vaginal discharge that has increased in amount or smells bad.     · You are sick to your stomach or cannot drink fluids.     · You have loose stitches, or your incision comes open.     · Bright red blood has soaked through the bandage over your incision.     · You have signs of infection, such as:  ? Increased pain, swelling, warmth, or redness. ? Red streaks leading from the incision. ? Pus draining from the incision. ? A fever.     · You have bright red vaginal bleeding that soaks one or more pads in an hour, or you have large clots.     · You have signs of a blood clot in your leg (called a deep vein thrombosis), such as:  ? Pain in your calf, back of the knee, thigh, or groin. ? Redness and swelling in your leg. Watch closely for changes in your health, and be sure to contact your doctor if you have any problems. Where can you learn more? Go to https://chpepiceweb.ZeaKal. org and sign in to your IID account.  Enter T584 in the FieldLens box to learn more about \"Laparoscopically Assisted Vaginal Hysterectomy: What to Expect at Home. \"     If you do not have an account, please click on the \"Sign Up Now\" link. Current as of: July 17, 2020               Content Version: 12.8  © 2006-2021 [employer]. Care instructions adapted under license by [facility] ([facility]). If you have questions about a medical condition or this instruction, always ask your healthcare professional. [address] any warranty or liability for your use of this information. Patient Education        Abdominal Hysterectomy: Before Your Surgery  What is an abdominal hysterectomy? Abdominal hysterectomy is surgery to remove the uterus. The cervix is often taken out too. This is done through a cut in the lower belly. The cut is called an incision. The ovaries and fallopian tubes also may be removed. The doctor may make a horizontal incision. This cut goes from side-to-side and is done at the pubic hairline. It's called a \"bikini cut. \" Or the incision may be vertical. This type goes up and down between the belly button and the pubic bone. Both types leave a scar that most often fades with time. After the surgery, you will no longer have periods or be able to get pregnant. Your doctor may have you take hormones if your ovaries were removed. He or she will talk to you about the risks and benefits of hormones. You can also discuss how long you should take them. This surgery probably won't lower your interest in sex. In fact, some women enjoy sex more. This may be because they no longer have to worry about birth control or heavy bleeding. Most women go home in 1 to 2 days. You will likely feel better each day. But you may need about 4 to 6 weeks to fully recover. Follow-up care is a key part of your treatment and safety. Be sure to make and go to all appointments, and call your doctor if you are having problems.  It's also a good idea to know your test results and keep a list of the medicines you take. How do you prepare for surgery? Surgery can be stressful. This information will help you understand what you can expect. And it will help you safely prepare for surgery. Preparing for surgery    · Be sure you have someone to take you home. Anesthesia and pain medicine will make it unsafe for you to drive or get home on your own.     · Understand exactly what surgery is planned, along with the risks, benefits, and other options.     · If you take aspirin or some other blood thinner, ask your doctor if you should stop taking it before your surgery. Make sure that you understand exactly what your doctor wants you to do. These medicines increase the risk of bleeding.     · Tell your doctor ALL the medicines, vitamins, supplements, and herbal remedies you take. Some may increase the risk of problems during your surgery. Your doctor will tell you if you should stop taking any of them before the surgery and how soon to do it.     · Make sure your doctor and the hospital have a copy of your advance directive. If you don't have one, you may want to prepare one. It lets others know your health care wishes. It's a good thing to have before any type of surgery or procedure. What happens on the day of surgery? · Follow the instructions exactly about when to stop eating and drinking. If you don't, your surgery may be canceled. If your doctor told you to take your medicines on the day of surgery, take them with only a sip of water.     · Take a bath or shower before you come in for your surgery. Do not apply lotions, perfumes, deodorants, or nail polish.     · Do not shave the surgical site yourself.     · Take off all jewelry and piercings. And take out contact lenses, if you wear them. At the hospital or surgery center   · Bring a picture ID.     · You will be kept comfortable and safe by your anesthesia provider. The anesthesia may make you sleep.  Or it may just numb the area being worked on.     · The surgery takes about 1 to 2 hours. When should you call your doctor? · You have questions or concerns.     · You don't understand how to prepare for your surgery.     · You become ill before the surgery (such as fever, flu, or a cold).     · You need to reschedule or have changed your mind about having the surgery. Where can you learn more? Go to https://BubbleGabpepicewPowerUp Toys.HardDrones. org and sign in to your Touch Bionics account. Enter N413 in the Semblee_ box to learn more about \"Abdominal Hysterectomy: Before Your Surgery. \"     If you do not have an account, please click on the \"Sign Up Now\" link. Current as of: July 17, 2020               Content Version: 12.8  © 2006-2021 [employer]. Care instructions adapted under license by [facility] ([facility]). If you have questions about a medical condition or this instruction, always ask your healthcare professional. [place] [address] any warranty or liability for your use of this information. Patient Education        Abdominal Hysterectomy: What to Expect at Home  Your Recovery     An abdominal hysterectomy removes the uterus through a large cut (incision) in the belly. Your doctor made an incision in your lower belly and took out your uterus. You can expect to feel better and stronger each day. But you might need pain medicine for a week or two. You may get tired easily or have less energy than usual. This may last for several weeks after surgery. You will probably notice that your belly is swollen and puffy. This is common. The swelling will take several weeks to go down. It may take about 4 to 6 weeks to fully recover. It's important to avoid lifting while you are recovering so that you can heal.  This care sheet gives you a general idea about how long it will take for you to recover. But each person recovers at a different pace. Follow the steps below to get better as quickly as possible.   How can you care for yourself at home? Activity    · Rest when you feel tired. Getting enough sleep will help you recover.     · Try to walk each day. Start by walking a little more than you did the day before. Bit by bit, increase the amount you walk. Walking boosts blood flow and helps prevent pneumonia and constipation.     · Avoid lifting anything that would make you strain. This may include a child, heavy grocery bags and milk containers, a heavy briefcase or backpack, cat litter or dog food bags, or a vacuum .     · Avoid strenuous activities, such as biking, jogging, weight lifting, or aerobic exercise, until your doctor says it is okay.     · You may shower. Pat the cut (incision) dry. Do not take a bath for the first 2 weeks, or until your doctor tells you it is okay.     · Ask your doctor when you can drive again.     · You will probably need to take 2 to 4 weeks off from work. It depends on the type of work you do and how you feel.     · Your doctor will tell you when you can have sex again. Diet    · You can eat your normal diet. If your stomach is upset, try bland, low-fat foods like plain rice, broiled chicken, toast, and yogurt.     · Drink plenty of fluids (unless your doctor tells you not to).     · You may notice that your bowel movements are not regular right after your surgery. This is common. Try to avoid constipation and straining with bowel movements. You may want to take a fiber supplement every day. If you have not had a bowel movement after a couple of days, ask your doctor about taking a mild laxative. Medicines    · Your doctor will tell you if and when you can restart your medicines. You will also get instructions about taking any new medicines.     · If you take aspirin or some other blood thinner, ask your doctor if and when to start taking it again. Make sure that you understand exactly what your doctor wants you to do.     · Be safe with medicines. Take pain medicines exactly as directed.   ? If the doctor gave you a prescription medicine for pain, take it as prescribed. ? If you are not taking a prescription pain medicine, ask your doctor if you can take an over-the-counter medicine.     · If your doctor prescribed antibiotics, take them as directed. Do not stop taking them just because you feel better. You need to take the full course of antibiotics.     · If you think your pain medicine is making you sick to your stomach:  ? Take your medicine after meals (unless your doctor has told you not to). ? Ask your doctor for a different pain medicine. Incision care    · If you have strips of tape on the cut (incision) the doctor made, leave the tape on for a week or until it falls off. Or follow your doctor's instructions for removing the tape.     · Wash the area daily with warm, soapy water, and pat it dry. Don't use hydrogen peroxide or alcohol, which can slow healing. You may cover the area with a gauze bandage if it weeps or rubs against clothing. Change the bandage every day.     · Keep the area clean and dry. Other instructions    · You may have some light vaginal bleeding. Wear sanitary pads if needed. Do not douche or use tampons. Follow-up care is a key part of your treatment and safety. Be sure to make and go to all appointments, and call your doctor if you are having problems. It's also a good idea to know your test results and keep a list of the medicines you take. When should you call for help? Call 911 anytime you think you may need emergency care. For example, call if:    · You passed out (lost consciousness).     · You have chest pain, are short of breath, or cough up blood.    Call your doctor now or seek immediate medical care if:    · You have pain that does not get better after you take pain medicine.     · You cannot pass stools or gas.     · You have vaginal discharge that has increased in amount or smells bad.     · You are sick to your stomach or cannot drink fluids.     · You have loose stitches, or your incision comes open.     · Bright red blood has soaked through the bandage over your incision.     · You have signs of infection, such as:  ? Increased pain, swelling, warmth, or redness. ? Red streaks leading from the incision. ? Pus draining from the incision. ? A fever.     · You have bright red vaginal bleeding that soaks one or more pads in an hour, or you have large clots.     · You have signs of a blood clot in your leg (called a deep vein thrombosis), such as:  ? Pain in your calf, back of the knee, thigh, or groin. ? Redness and swelling in your leg. Watch closely for changes in your health, and be sure to contact your doctor if you have any problems. Where can you learn more? Go to https://LearnVestpeVirtualUeb.Saberr. org and sign in to your [email or web address] account. Enter M280 in the Extreme Reach (formerly BrandAds) box to learn more about \"Abdominal Hysterectomy: What to Expect at Home. \"     If you do not have an account, please click on the \"Sign Up Now\" link. Current as of: July 17, 2020               Content Version: 12.8  © 1929-8923 Healthwise, Incorporated. Care instructions adapted under license by [facility] [place] ([facility]). If you have questions about a medical condition or this instruction, always ask your healthcare professional. [address] any warranty or liability for your use of this information.

## 2021-05-12 NOTE — PROGRESS NOTES
Patient here initially for Endosee with bx for AUB ( see previous notes ). Reviewed US and labs today. US essentially negative with thin ECHO, which is reassuring. Labs indicate menopause. Patient states she does have HF and NS. States bleeding is more frequent now and often passing large clots. States she woke up one morning soaked in blood. Discussed procedure today and that results may be sub-optimal and/or difficult to tolerate because of previous ablation. Discussed other forms of bleeding control with risks and benefits as well. After lengthy discussion, patient opted to decline procedures today and requested consult for hysterectomy with Dr Elizabeth Green. All questions answered. F/U pre-op consult. Pt was seen with total face to face time of 20 minutes with more than 50% of the visit being counseling and education regarding encounter dx of AUB. See discussion /counseling details as stated above. Vitals:  /60   Ht 5' 3\" (1.6 m)   Wt 164 lb (74.4 kg)   BMI 29.05 kg/m²   Past Medical History:   Diagnosis Date    ADD (attention deficit disorder)     Allergic rhinitis     Anxiety     Depression     GERD (gastroesophageal reflux disease)     Herpes simplex virus type 1 (HSV-1) dermatitis     oral only.     Migraines      Past Surgical History:   Procedure Laterality Date    BREAST BIOPSY      BREAST LUMPECTOMY Left 12/2014    D[facility]    TONSILLECTOMY AND ADENOIDECTOMY      UPPER GASTROINTESTINAL ENDOSCOPY  9/9/08    DR Michael Rahman    UPPER GASTROINTESTINAL ENDOSCOPY  8/27/10    DR Michael Rahman     Allergies:  Doxycycline and Prochlorperazine edisylate  Current Outpatient Medications   Medication Sig Dispense Refill    buPROPion (WELLBUTRIN XL) 300 MG extended release tablet TAKE 1 TABLET BY MOUTH EVERY DAY IN THE MORNING 90 tablet 3    dexlansoprazole (DEXILANT) 60 MG CPDR delayed release capsule Take 1 capsule by mouth daily 90 capsule 3    valACYclovir (VALTREX) 500 MG tablet TAKE 2 TABLETS BY MOUTH TWICE DAILY AS NEEDED FOR COLD SORES 21 tablet 3    ADDERALL XR 30 MG capsule TK ONE C PO QD IN THE MORNING UP ON AWAKENING      VALACYCLOVIR HCL PO       traZODone (DESYREL) 50 MG tablet TK 1 T PO QHS  3    topiramate (TOPAMAX) 50 MG tablet TK 1 T PO QHS  3    fluticasone (FLONASE) 50 MCG/ACT nasal spray 1 spray by Nasal route daily 1 Bottle 5     No current facility-administered medications for this visit. Social History     Socioeconomic History    Marital status:      Spouse name: Not on file    Number of children: Not on file    Years of education: Not on file    Highest education level: Not on file   Occupational History    Not on file   Social Needs    Financial resource strain: Not on file    Food insecurity     Worry: Not on file     Inability: Not on file    Transportation needs     Medical: Not on file     Non-medical: Not on file   Tobacco Use    Smoking status: Former Smoker    Smokeless tobacco: Never Used   Substance and Sexual Activity    Alcohol use:  Yes     Alcohol/week: 0.0 standard drinks     Comment: occ    Drug use: No    Sexual activity: Yes     Partners: Male   Lifestyle    Physical activity     Days per week: Not on file     Minutes per session: Not on file    Stress: Not on file   Relationships    Social connections     Talks on phone: Not on file     Gets together: Not on file     Attends Zoroastrian service: Not on file     Active member of club or organization: Not on file     Attends meetings of clubs or organizations: Not on file     Relationship status: Not on file    Intimate partner violence     Fear of current or ex partner: Not on file     Emotionally abused: Not on file     Physically abused: Not on file     Forced sexual activity: Not on file   Other Topics Concern    Not on file   Social History Narrative    Not on file        Family History   Problem Relation Age of Onset    No Known Problems Mother     No Known Problems Father     No Known Problems Paternal Grandfather     No Known Problems Paternal Grandmother     No Known Problems Maternal Grandmother     No Known Problems Maternal Grandfather     No Known Problems Brother     No Known Problems Sister     No Known Problems Other     Breast Cancer Neg Hx     Cancer Neg Hx     Colon Cancer Neg Hx     Diabetes Neg Hx     Eclampsia Neg Hx     Hypertension Neg Hx     Ovarian Cancer Neg Hx      Labor Neg Hx     Spont Abortions Neg Hx     Stroke Neg Hx        Review of Systems   Constitutional: Negative. Negative for activity change, appetite change, chills, diaphoresis, fatigue, fever and unexpected weight change. HENT: Negative. Eyes: Negative. Respiratory: Negative. Cardiovascular: Negative. Gastrointestinal: Negative for abdominal distention, abdominal pain, anal bleeding, blood in stool, constipation, diarrhea, nausea, rectal pain and vomiting. Endocrine: Negative. Genitourinary: Positive for menstrual problem (AUB). Negative for decreased urine volume, difficulty urinating, dyspareunia, dysuria, enuresis, flank pain, frequency, genital sores, hematuria, pelvic pain, urgency, vaginal bleeding, vaginal discharge and vaginal pain. Musculoskeletal: Negative. Skin: Negative. Allergic/Immunologic: Negative. Neurological: Negative. Hematological: Negative. Psychiatric/Behavioral: Negative. Objective:     Physical Exam  Constitutional:       General: She is not in acute distress. Appearance: She is well-developed. She is not diaphoretic. HENT:      Head: Normocephalic and atraumatic. Eyes:      Conjunctiva/sclera: Conjunctivae normal.   Neck:      Musculoskeletal: Normal range of motion and neck supple. Cardiovascular:      Rate and Rhythm: Normal rate and regular rhythm. Pulmonary:      Effort: Pulmonary effort is normal. No respiratory distress. Musculoskeletal: Normal range of motion. General: No tenderness or deformity. Skin:     General: Skin is warm and dry. Coloration: Skin is not pale. Neurological:      Mental Status: She is alert and oriented to person, place, and time. Motor: No abnormal muscle tone. Coordination: Coordination normal.   Psychiatric:         Behavior: Behavior normal.         Thought Content: Thought content normal.         Judgment: Judgment normal.         Assessment:          Diagnosis Orders   1. Abnormal uterine bleeding (AUB)  POC Pregnancy Urine Qual    Ester Jarrett MD, OB-GYN, [address] [place] [address]   2. Urine pregnancy test negative          Plan:      Medications placedthis encounter:  No orders of the defined types were placed in this encounter. Orders placedthis encounter:  Orders Placed This Encounter   Procedures   Jose Zambrano MD, OB-GYN, [address] [place] [address]     Referral Priority:   Routine     Referral Type:   Eval and Treat     Referral Reason:   Specialty Services Required     Referred to Provider:   Genia Jimenez MD     Requested Specialty:   Obstetrics & Gynecology     Number of Visits Requested:   1    POC Pregnancy Urine Qual         Follow up:  Return for Annual, Consult  [facility] ( Surgery Consult ).

## 2021-05-26 ENCOUNTER — TELEPHONE (OUTPATIENT)
Dept: OBGYN CLINIC | Age: 51
End: 2021-05-26

## 2021-05-26 NOTE — TELEPHONE ENCOUNTER
Pt has been rescheduled for Dr. Prosper Kelsey twice. She would like to know is their someone else she can see, because her appt is now three weeks away.

## 2021-06-03 ENCOUNTER — OFFICE VISIT (OUTPATIENT)
Dept: OBGYN CLINIC | Age: 51
End: 2021-06-03
Payer: COMMERCIAL

## 2021-06-03 VITALS
BODY MASS INDEX: 29.94 KG/M2 | HEART RATE: 89 BPM | WEIGHT: 169 LBS | DIASTOLIC BLOOD PRESSURE: 84 MMHG | SYSTOLIC BLOOD PRESSURE: 116 MMHG

## 2021-06-03 DIAGNOSIS — N93.9 ABNORMAL UTERINE BLEEDING (AUB): Primary | ICD-10-CM

## 2021-06-03 DIAGNOSIS — N39.490 OVERFLOW STRESS URINARY INCONTINENCE IN FEMALE: ICD-10-CM

## 2021-06-03 DIAGNOSIS — N39.3 OVERFLOW STRESS URINARY INCONTINENCE IN FEMALE: ICD-10-CM

## 2021-06-03 PROCEDURE — 99214 OFFICE O/P EST MOD 30 MIN: CPT | Performed by: OBSTETRICS & GYNECOLOGY

## 2021-06-03 ASSESSMENT — ENCOUNTER SYMPTOMS
ABDOMINAL PAIN: 0
VOICE CHANGE: 0
NAUSEA: 0
ABDOMINAL DISTENTION: 0
CONSTIPATION: 0
WHEEZING: 0
BLOOD IN STOOL: 0
SHORTNESS OF BREATH: 0
TROUBLE SWALLOWING: 0
CHEST TIGHTNESS: 0
SORE THROAT: 0
COLOR CHANGE: 0
BACK PAIN: 0
COUGH: 0
VOMITING: 0

## 2021-06-03 NOTE — PROGRESS NOTES
Gokul Tanner (: 1970) is a 54 y.o. female, Established patient, here for evaluation of the following chief complaint(s): Patient is here for evaluation of abnormal uterine bleeding. She had an ablation several years ago had no bleeding whatsoever and then began bleeding a month or so ago and has not stopped although at this today it is very light. She has symptoms referable to menstrual cycle. She has an ultrasound which showed an enlarged uterus suggestive of adenomyosis and a thin endometrium. She also has stress urinary incontinence. 3 vaginal deliveries. SUBJECTIVE/OBJECTIVE:  HPI    Past Surgical History:   Procedure Laterality Date    BREAST BIOPSY      BREAST LUMPECTOMY Left 2014    DILATION AND CURETTAGE OF UTERUS  2016     [facility]    TONSILLECTOMY AND ADENOIDECTOMY      UPPER GASTROINTESTINAL ENDOSCOPY  08    DR Kartik Callahan    UPPER GASTROINTESTINAL ENDOSCOPY  8/27/10    DR CUMMINS        Review of Systems   Constitutional: Negative for activity change, appetite change, fatigue and unexpected weight change. HENT: Negative for dental problem, ear pain, hearing loss, nosebleeds, sore throat, trouble swallowing and voice change. Eyes: Negative for visual disturbance. Respiratory: Negative for cough, chest tightness, shortness of breath and wheezing. Cardiovascular: Negative for chest pain and palpitations. Gastrointestinal: Negative for abdominal distention, abdominal pain, blood in stool, constipation, nausea and vomiting. Endocrine: Negative for cold intolerance, heat intolerance, polydipsia, polyphagia and polyuria. Genitourinary: Positive for menstrual problem. Negative for difficulty urinating, dyspareunia, dysuria, flank pain, frequency, genital sores, hematuria, pelvic pain, urgency, vaginal bleeding, vaginal discharge and vaginal pain. Musculoskeletal: Negative for arthralgias, back pain, joint swelling and myalgias.    Skin: Negative for color change and rash. Allergic/Immunologic: Negative for environmental allergies, food allergies and immunocompromised state. Neurological: Negative for dizziness, seizures, syncope, speech difficulty, weakness, numbness and headaches. Hematological: Negative for adenopathy. Does not bruise/bleed easily. Psychiatric/Behavioral: Negative for agitation, behavioral problems, confusion, decreased concentration, dysphoric mood and suicidal ideas. The patient is not nervous/anxious and is not hyperactive. Physical Exam  Vitals reviewed. Exam conducted with a chaperone present. Constitutional:       Appearance: Normal appearance. She is well-developed and normal weight. HENT:      Head: Normocephalic and atraumatic. Eyes:      Pupils: Pupils are equal, round, and reactive to light. Neck:      Thyroid: No thyromegaly. Trachea: No tracheal deviation. Cardiovascular:      Rate and Rhythm: Normal rate and regular rhythm. Heart sounds: Normal heart sounds. Pulmonary:      Effort: Pulmonary effort is normal.      Breath sounds: Normal breath sounds. Chest:      Chest wall: No tenderness. Abdominal:      General: Bowel sounds are normal. There is no distension. Palpations: Abdomen is soft. There is no mass. Tenderness: There is no abdominal tenderness. There is no guarding or rebound. Hernia: There is no hernia in the left inguinal area. Genitourinary:     Labia:         Right: No rash, tenderness, lesion or injury. Left: No rash, tenderness, lesion or injury. Vagina: Normal. No foreign body. No vaginal discharge, erythema, tenderness or bleeding. Cervix: No cervical motion tenderness or discharge. Uterus: Not deviated, not enlarged, not fixed and not tender. Adnexa:         Right: No mass, tenderness or fullness. Left: No mass, tenderness or fullness.         Rectum: Normal. No mass, tenderness, anal fissure, external hemorrhoid or internal hemorrhoid. Normal anal tone. Musculoskeletal:         General: Normal range of motion. Cervical back: Normal range of motion. Lymphadenopathy:      Cervical: No cervical adenopathy. Neurological:      Mental Status: She is alert and oriented to person, place, and time. Vitals:    06/03/21 1036   BP: 116/84   Pulse: 89   Weight: 169 lb (76.7 kg)       ASSESSMENT/PLAN:  Abnormal uterine bleeding  Failed ablation  Stress urinary incontinence  Hypermobile urethra  Plan LAVH bilateral salpingectomy plan preservation of the ovaries and a transvaginal tape and a cystoscopy  Risk benefits alternatives of the procedure explained to her in detail      No follow-ups on file. 30 minutes spent with the patient discussing all relevant issues      An electronic signature was used to authenticate this note.

## 2021-06-14 RX ORDER — ALPRAZOLAM 1 MG/1
TABLET ORAL
Qty: 30 TABLET | OUTPATIENT
Start: 2021-06-14

## 2021-06-21 ENCOUNTER — OFFICE VISIT (OUTPATIENT)
Dept: FAMILY MEDICINE CLINIC | Age: 51
End: 2021-06-21
Payer: COMMERCIAL

## 2021-06-21 VITALS
BODY MASS INDEX: 30.05 KG/M2 | HEART RATE: 63 BPM | TEMPERATURE: 97.6 F | WEIGHT: 169.6 LBS | OXYGEN SATURATION: 98 % | HEIGHT: 63 IN | DIASTOLIC BLOOD PRESSURE: 70 MMHG | SYSTOLIC BLOOD PRESSURE: 116 MMHG

## 2021-06-21 DIAGNOSIS — Z01.818 PRE-OP EXAM: Primary | ICD-10-CM

## 2021-06-21 DIAGNOSIS — Z01.818 PRE-OP EXAM: ICD-10-CM

## 2021-06-21 LAB
HCT VFR BLD CALC: 41.8 % (ref 37–47)
HEMOGLOBIN: 14 G/DL (ref 12–16)
MCH RBC QN AUTO: 31.1 PG (ref 27–31.3)
MCHC RBC AUTO-ENTMCNC: 33.5 % (ref 33–37)
MCV RBC AUTO: 92.8 FL (ref 82–100)
PDW BLD-RTO: 13.9 % (ref 11.5–14.5)
PLATELET # BLD: 245 K/UL (ref 130–400)
RBC # BLD: 4.5 M/UL (ref 4.2–5.4)
WBC # BLD: 5.8 K/UL (ref 4.8–10.8)

## 2021-06-21 PROCEDURE — 93000 ELECTROCARDIOGRAM COMPLETE: CPT | Performed by: PHYSICIAN ASSISTANT

## 2021-06-21 SDOH — ECONOMIC STABILITY: FOOD INSECURITY: WITHIN THE PAST 12 MONTHS, YOU WORRIED THAT YOUR FOOD WOULD RUN OUT BEFORE YOU GOT MONEY TO BUY MORE.: NEVER TRUE

## 2021-06-21 SDOH — ECONOMIC STABILITY: FOOD INSECURITY: WITHIN THE PAST 12 MONTHS, THE FOOD YOU BOUGHT JUST DIDN'T LAST AND YOU DIDN'T HAVE MONEY TO GET MORE.: NEVER TRUE

## 2021-06-21 ASSESSMENT — SOCIAL DETERMINANTS OF HEALTH (SDOH): HOW HARD IS IT FOR YOU TO PAY FOR THE VERY BASICS LIKE FOOD, HOUSING, MEDICAL CARE, AND HEATING?: NOT HARD AT ALL

## 2021-06-21 ASSESSMENT — PATIENT HEALTH QUESTIONNAIRE - PHQ9
SUM OF ALL RESPONSES TO PHQ QUESTIONS 1-9: 0
2. FEELING DOWN, DEPRESSED OR HOPELESS: 0
SUM OF ALL RESPONSES TO PHQ9 QUESTIONS 1 & 2: 0
SUM OF ALL RESPONSES TO PHQ QUESTIONS 1-9: 0
1. LITTLE INTEREST OR PLEASURE IN DOING THINGS: 0
SUM OF ALL RESPONSES TO PHQ QUESTIONS 1-9: 0

## 2021-06-21 NOTE — PROGRESS NOTES
Preoperative Consultation      Pascual Matthews  YOB: 1970    Date of Service:  6/21/2021    Vitals:    06/21/21 0914   BP: 116/70   Site: Left Upper Arm   Position: Sitting   Cuff Size: Small Adult   Pulse: 63   Temp: 97.6 °F (36.4 °C)   SpO2: 98%   Weight: 169 lb 9.6 oz (76.9 kg)   Height: 5' 3\" (1.6 m)      Wt Readings from Last 2 Encounters:   06/21/21 169 lb 9.6 oz (76.9 kg)   06/03/21 169 lb (76.7 kg)     BP Readings from Last 3 Encounters:   06/21/21 116/70   06/03/21 116/84   05/12/21 102/60        Chief Complaint   Patient presents with    Pre-op Exam     Pt is here for pre-op examination. Allergies   Allergen Reactions    Doxycycline Other (See Comments)     Heart burn    Prochlorperazine Edisylate      ANXIETY     Outpatient Medications Marked as Taking for the 6/21/21 encounter (Office Visit) with KIRBY Pickering   Medication Sig Dispense Refill    buPROPion (WELLBUTRIN XL) 300 MG extended release tablet TAKE 1 TABLET BY MOUTH EVERY DAY IN THE MORNING 90 tablet 3    dexlansoprazole (DEXILANT) 60 MG CPDR delayed release capsule Take 1 capsule by mouth daily 90 capsule 3    valACYclovir (VALTREX) 500 MG tablet TAKE 2 TABLETS BY MOUTH TWICE DAILY AS NEEDED FOR COLD SORES 21 tablet 3    ADDERALL XR 30 MG capsule TK ONE C PO QD IN THE MORNING UP ON AWAKENING      traZODone (DESYREL) 50 MG tablet TK 1 T PO QHS  3    topiramate (TOPAMAX) 50 MG tablet 100 mg 2 times daily   3    fluticasone (FLONASE) 50 MCG/ACT nasal spray 1 spray by Nasal route daily 1 Bottle 5       This patient presents to the office today for a preoperative consultation at the request of surgeon, Dr. Corinne Rowan, who plans on performing LAVH bilateral salpingectomy with trasnvaginal tape and cystoscopy on June 28 at [facility]. The current problem began 2 months ago, and symptoms have been worsening with time. Conservative therapy: N/A.     Planned anesthesia: General   Known anesthesia problems: None   Bleeding risk: No recent or remote history of abnormal bleeding  Personal or FH of DVT/PE: No    Patient objection to receiving blood products: No    Patient Active Problem List   Diagnosis    Anxiety    Allergic rhinitis    GERD (gastroesophageal reflux disease)    Depression    Breast mass    Migraine    Elderly primigravida, antepartum    Pre-op exam       Past Medical History:   Diagnosis Date    ADD (attention deficit disorder)     Allergic rhinitis     Anxiety     Depression     GERD (gastroesophageal reflux disease)     Herpes simplex virus type 1 (HSV-1) dermatitis     oral only.     Migraines      Past Surgical History:   Procedure Laterality Date    BREAST BIOPSY      BREAST LUMPECTOMY Left 2014    [facility]TERUS  2016     [facility]    TONSILLECTOMY AND ADENOIDECTOMY      UPPER GASTROINTESTINAL ENDOSCOPY  08    DR Cabrera Serum    UPPER GASTROINTESTINAL ENDOSCOPY  8/27/10    DR Cabrera Serum     Family History   Problem Relation Age of Onset    No Known Problems Mother     No Known Problems Father     No Known Problems Paternal Grandfather     No Known Problems Paternal Grandmother     No Known Problems Maternal Grandmother     No Known Problems Maternal Grandfather     No Known Problems Brother     No Known Problems Sister     No Known Problems Other     Breast Cancer Neg Hx     Cancer Neg Hx     Colon Cancer Neg Hx     Diabetes Neg Hx     Eclampsia Neg Hx     Hypertension Neg Hx     Ovarian Cancer Neg Hx      Labor Neg Hx     Spont Abortions Neg Hx     Stroke Neg Hx      Social History     Socioeconomic History    Marital status:      Spouse name: Not on file    Number of children: Not on file    Years of education: Not on file    Highest education level: Not on file   Occupational History    Not on file   Tobacco Use    Smoking status: Former Smoker    Smokeless tobacco: Never Used   Substance and Sexual Activity    Alcohol use: Yes     Alcohol/week: 0.0 standard drinks     Comment: occ    Drug use: No    Sexual activity: Yes     Partners: Male   Other Topics Concern    Not on file   Social History Narrative    Not on file     Social Determinants of Health     Financial Resource Strain: Low Risk     Difficulty of Paying Living Expenses: Not hard at all   Food Insecurity: No Food Insecurity    Worried About Running Out of Food in the Last Year: Never true    [address] in the Last Year: Never true   Transportation Needs:     Lack of Transportation (Medical):  Lack of Transportation (Non-Medical):    Physical Activity:     Days of Exercise per Week:     Minutes of Exercise per Session:    Stress:     Feeling of Stress :    Social Connections:     Frequency of Communication with Friends and Family:     Frequency of Social Gatherings with Friends and Family:     Attends Taoism Services:     Active Member of Clubs or Organizations:     Attends Club or Organization Meetings:     Marital Status:    Intimate Partner Violence:     Fear of Current or Ex-Partner:     Emotionally Abused:     Physically Abused:     Sexually Abused:        Review of Systems  A comprehensive review of systems was negative except for what was noted in the HPI. Physical Exam   Constitutional: She is oriented to person, place, and time. She appears well-developed and well-nourished. No distress. HENT:   Head: Normocephalic and atraumatic. Mouth/Throat: Uvula is midline, oropharynx is clear and moist and mucous membranes are normal.   Eyes: Conjunctivae and EOM are normal. Pupils are equal, round, and reactive to light. Neck: Trachea normal and normal range of motion. Neck supple. No JVD present. Carotid bruit is not present. No mass and no thyromegaly present. Cardiovascular: Normal rate, regular rhythm, normal heart sounds and intact distal pulses. Exam reveals no gallop and no friction rub.   No murmur heard.  Pulmonary/Chest: Effort normal and breath sounds normal. No respiratory distress. She has no wheezes. She has no rales. Abdominal: Soft. Normal aorta and bowel sounds are normal. She exhibits no distension and no mass. There is no hepatosplenomegaly. No tenderness. Musculoskeletal: She exhibits no edema and no tenderness. Neurological: She is alert and oriented to person, place, and time. She has normal strength. No cranial nerve deficit or sensory deficit. Coordination and gait normal.   Skin: Skin is warm and dry. No rash noted. No erythema. Psychiatric: She has a normal mood and affect. Her behavior is normal.     EKG Interpretation:  normal EKG, normal sinus rhythm, unchanged from previous tracings.     Lab Review   Procedure visit on 05/12/2021   Component Date Value    HCG, Urine, POC 05/12/2021 Negative     Lot Number 05/12/2021 [id number]     Positive QC Pass/Fail 05/12/2021 Pass     Negative QC Pass/Fail 05/12/2021 Pass    Orders Only on 04/22/2021   Component Date Value    LH 04/22/2021 71.2     TSH 04/22/2021 1.230     hCG Quant 04/22/2021 4.8     Protime 04/22/2021 12.7     INR 04/22/2021 0.9     aPTT 04/22/2021 28.0     WBC 04/22/2021 4.7*    RBC 04/22/2021 4.25     Hemoglobin 04/22/2021 13.4     Hematocrit 04/22/2021 39.5     MCV 04/22/2021 92.9     MCH 04/22/2021 31.5*    MCHC 04/22/2021 33.9     RDW 04/22/2021 13.7     Platelets 71/09/3994 248     PLATELET SLIDE REVIEW 04/22/2021 Normal     Neutrophils % 04/22/2021 62.0     Lymphocytes % 04/22/2021 31.0     Monocytes % 04/22/2021 2.9     Eosinophils % 04/22/2021 3.0     Basophils % 04/22/2021 0.9     Neutrophils Absolute 04/22/2021 3.0     Lymphocytes Absolute 04/22/2021 1.5     Monocytes Absolute 04/22/2021 0.1*    Eosinophils Absolute 04/22/2021 0.1     Basophils Absolute 04/22/2021 0.0     Myelocyte Percent 04/22/2021 1     Anisocytosis 04/22/2021 1+     Microcytes 04/22/2021 1+     Stomatocytes 04/22/2021 1+     [address] 04/22/2021 120.8    Office Visit on 04/22/2021   Component Date Value    Glucose, UA POC 04/22/2021 neg     Bilirubin, UA 04/22/2021 neg     Ketones, UA 04/22/2021 neg     Spec Grav, UA 04/22/2021 1.020     Blood, UA POC 04/22/2021 neg     pH, UA 04/22/2021 7.0     Protein, UA POC 04/22/2021 neg     Urobilinogen, UA 04/22/2021 neg     Leukocytes, UA 04/22/2021 1+     Nitrite, UA 04/22/2021 neg    Orders Only on 02/15/2021   Component Date Value    WBC 02/15/2021 5.1     RBC 02/15/2021 4.75     Hemoglobin 02/15/2021 14.4     Hematocrit 02/15/2021 43.7     MCV 02/15/2021 92     MCHC 02/15/2021 33.0     Platelets 70/26/1427 252     RDW-CV 02/15/2021 12.9     Neutrophils % 02/15/2021 55.6     Immature Granulocytes % 02/15/2021 0.2     Lymphocytes 02/15/2021 32.3     Monocytes % 02/15/2021 7.5     Eosinophils % 02/15/2021 3.8     Basophils % 02/15/2021 0.6     Neutrophils 02/15/2021 2.81     Lymphocytes % 02/15/2021 1.63     Monocytes 02/15/2021 0.38     Eosinophils 02/15/2021 0.19     Basophils 02/15/2021 0.03     Protime 02/15/2021 11.8     INR 02/15/2021 1.0     VTE Exclusion D-Dimer 02/15/2021 301     hCG Qual 02/15/2021 NEGATIVE     Glucose 02/15/2021 101*    Sodium 02/15/2021 137     Potassium 02/15/2021 4.0     Chloride 02/15/2021 101     HCO3 02/15/2021 27     Anion Gap 02/15/2021 13     Urea Nitrogen 02/15/2021 15     CREATININE 02/15/2021 0.93     GFR Non- 02/15/2021 >60     GFR  02/15/2021 >60     Calcium 02/15/2021 10.1     Troponin I 02/15/2021 <0.02     BNP 02/15/2021 23     SARS-CoV-2, PCR 02/15/2021 NOT DETECTED     Specimen Source 02/15/2021 Nasal, Nasopharyngeal     WBC 02/17/2021 7.7     RBC 02/17/2021 4.61     Hemoglobin 02/17/2021 14.0     Hematocrit 02/17/2021 43.0     MCV 02/17/2021 93     MCHC 02/17/2021 32.6     Platelets 99/97/3434 257     RDW-CV 02/17/2021 13.2     Neutrophils % 02/17/2021 69.1     Immature Granulocytes % 02/17/2021 0.3     Lymphocytes 02/17/2021 22.3     Monocytes % 02/17/2021 5.6     Eosinophils % 02/17/2021 2.2     Basophils % 02/17/2021 0.5     Neutrophils 02/17/2021 5.31     Lymphocytes % 02/17/2021 1.71     Monocytes 02/17/2021 0.43     Eosinophils 02/17/2021 0.17     Basophils 02/17/2021 0.04     Protime 02/17/2021 10.9     INR 02/17/2021 0.9     Magnesium 02/17/2021 2.10     Glucose 02/17/2021 121*    Sodium 02/17/2021 141     Potassium 02/17/2021 4.4     Chloride 02/17/2021 105     HCO3 02/17/2021 27     Anion Gap 02/17/2021 13     Urea Nitrogen 02/17/2021 24*    CREATININE 02/17/2021 0.84     GFR Non- 02/17/2021 >60     GFR  02/17/2021 >60     Calcium 02/17/2021 9.5     Albumin 02/17/2021 4.3     Alkaline Phosphatase 02/17/2021 68     Total Protein 02/17/2021 7.2     AST 02/17/2021 17     Total Bilirubin 02/17/2021 0.2     ALT 02/17/2021 17     Troponin I 02/17/2021 <0.02     Troponin I 02/18/2021 <0.02     Troponin I 02/18/2021 CANCELED     WBC 02/18/2021 7.5     RBC 02/18/2021 4.63     Hemoglobin 02/18/2021 14.2     Hematocrit 02/18/2021 43.5     MCV 02/18/2021 94     MCHC 02/18/2021 32.6     Platelets 45/12/4509 243     RDW-CV 02/18/2021 13.2     Troponin I 02/18/2021 <0.02     Troponin I 02/18/2021 <0.02     Glucose 02/18/2021 160*    Sodium 02/18/2021 138     Potassium 02/18/2021 4.3     Chloride 02/18/2021 104     HCO3 02/18/2021 26     Anion Gap 02/18/2021 12     Urea Nitrogen 02/18/2021 24*    CREATININE 02/18/2021 0.81     GFR Non- 02/18/2021 >60     GFR  02/18/2021 >60     Calcium 02/18/2021 9.5     Troponin I 02/18/2021 <0.02     Hemoglobin A1C 02/18/2021 5.9     eAG 02/18/2021 123            Assessment:       54 y.o. patient with planned surgery as above.     Known risk factors for perioperative complications: None  Current medications which may produce withdrawal symptoms if withheld perioperatively: none      Plan:     1. Preoperative workup as follows: ECG, hemoglobin, hematocrit  2. Change in medication regimen before surgery: None  3. Prophylaxis for cardiac events with perioperative beta-blockers: Not indicated  ACC/AHA indications for pre-operative beta-blocker use:    · Vascular surgery with history of postitive stress test  · Intermediate or high risk surgery with history of CAD   · Intermediate or high risk surgery with multiple clinical predictors of CAD- 2 of the following: history of compensated or prior heart failure, history of cerebrovascular disease, DM, or renal insufficiency    Routine administration of higher-dose, long-acting metoprolol in beta-blockernaïve patients on the day of surgery, and in the absence of dose titration is associated with an overall increase in mortality. Beta-blockers should be started days to weeks prior to surgery and titrated to pulse < 70.  4. Deep vein thrombosis prophylaxis: regimen to be chosen by surgical team  5.  Please review above information for surgical clearance

## 2021-06-21 NOTE — PATIENT INSTRUCTIONS
Patient Education        Learning About How to Prepare for Surgery  How can you prepare before surgery? You can do some things that will help you safely prepare for surgery. · Understand exactly what surgery is planned. You should know the risks, benefits, and other options. · Tell your doctors ALL the medicines, vitamins, supplements, and herbal remedies you take. Some of these can increase the risk of bleeding. Or they may interact with anesthesia. · Follow your doctor's instructions about which medicines to take or stop before your surgery. ? You may need to stop taking some medicines a week or more before surgery. ? If you take aspirin or some other blood thinner, be sure to talk to your doctor. · Follow any other instructions your doctor gave you. · If you have an advance directive, let your doctor know, and bring a copy to the hospital.   It may include a living will and a durable power of  for health care. It lets your doctor and loved ones know your health care wishes. If you don't have one, you may want to prepare one. How can you prepare on the day of surgery? Here are some tips about what to do at home before you leave for your surgery. · If your doctor told you to take your medicines on the day of surgery, take them with only a sip of water. · Follow the instructions about when to stop eating and drinking. If you don't, your surgery may be canceled. · Follow your doctor's instructions about when to bathe or shower before your surgery. · Do not shave the surgical site yourself. · Take off all jewelry and piercings. · Take out contact lenses, if you wear them. · Have a picture ID ready to take with you. Your ID will be checked before your surgery. · Know when to call your doctor. Call your doctor if you:  ? Become ill before surgery. ? Need to reschedule. ? Have changed your mind about having the surgery. What happens before surgery?   Here are some things you can expect to happen before your surgery. · Your picture ID will be checked. · The area of your body that needs surgery is often marked to make sure there are no errors. · You will be kept comfortable and safe by your anesthesia provider. The anesthesia may make you sleep. Or it may just numb the area being worked on. What happens when you are ready to go home? Be sure you have someone drive you home. Anesthesia and pain medicine make it unsafe for you to drive. You will get instructions about recovering from your surgery. This is called a discharge plan. It will cover things like diet, wound care, follow-up care, driving, and getting back to your normal routine. Follow-up care is a key part of your treatment and safety. Be sure to make and go to all appointments, and call your doctor if you are having problems. It's also a good idea to know your test results and keep a list of the medicines you take. Where can you learn more? Go to [email or web address]. org and sign in to your [employer] account. Enter Q270 in the [employer] box to learn more about \"Learning About How to Prepare for Surgery. \"     If you do not have an account, please click on the \"Sign Up Now\" link. Current as of: May 27, 2020               Content Version: 12.9  © 2006-2021 [employer]. Care instructions adapted under license by [facility] ([facility]). If you have questions about a medical condition or this instruction, always ask your healthcare professional. [place] [address] any warranty or liability for your use of this information.

## 2021-06-27 ENCOUNTER — ANESTHESIA EVENT (OUTPATIENT)
Dept: OPERATING ROOM | Age: 51
End: 2021-06-27
Payer: COMMERCIAL

## 2021-06-27 NOTE — ANESTHESIA PRE PROCEDURE
Department of Anesthesiology  Preprocedure Note       Name:  Alessandra Coyle   Age:  54 y.o.  :  1970                                          MRN:  [id number]         Date:  2021      Surgeon: Jagdeep Piña):  Sweetie Castañeda, DO Sweetie Castañeda DO    Procedure: Procedure(s):  CISCO BS  TVTO CYSTOSCOPY  (PAT AT [facility])    Medications prior to admission:   Prior to Admission medications    Medication Sig Start Date End Date Taking? Authorizing Provider   buPROPion (WELLBUTRIN XL) 300 MG extended release tablet TAKE 1 TABLET BY MOUTH EVERY DAY IN THE MORNING 3/23/21   Ashanti Glaser MD   dexlansoprazole [facility]) 60 MG CPDR delayed release capsule Take 1 capsule by mouth daily 21   Ashanti Glaser MD   valACYclovir (VALTREX) 500 MG tablet TAKE 2 TABLETS BY MOUTH TWICE DAILY AS NEEDED FOR COLD SORES 20   Mel Clark MD   ADDERALL XR 30 MG capsule TK ONE C PO QD IN THE MORNING UP ON AWAKENING 20   Historical Provider, MD   traZODone (DESYREL) 50 MG tablet TK 1 T PO QHS 10/4/18   Historical Provider, MD   topiramate (TOPAMAX) 50 MG tablet 100 mg 2 times daily  10/4/18   Historical Provider, MD   fluticasone [facility] [place] [place]) 50 MCG/ACT nasal spray 1 spray by Nasal route daily 17   Ashanti Glaser MD       Current medications:    No current facility-administered medications for this encounter.      Current Outpatient Medications   Medication Sig Dispense Refill    buPROPion (WELLBUTRIN XL) 300 MG extended release tablet TAKE 1 TABLET BY MOUTH EVERY DAY IN THE MORNING 90 tablet 3    dexlansoprazole (DEXILANT) 60 MG CPDR delayed release capsule Take 1 capsule by mouth daily 90 capsule 3    valACYclovir (VALTREX) 500 MG tablet TAKE 2 TABLETS BY MOUTH TWICE DAILY AS NEEDED FOR COLD SORES 21 tablet 3    ADDERALL XR 30 MG capsule TK ONE C PO QD IN THE MORNING UP ON AWAKENING      traZODone (DESYREL) 50 MG tablet TK 1 T PO QHS  3    topiramate (TOPAMAX) 50 MG tablet 100 mg 2 times daily   3    fluticasone (FLONASE) 50 MCG/ACT nasal spray 1 spray by Nasal route daily 1 Bottle 5       Allergies: Allergies   Allergen Reactions    Doxycycline Other (See Comments)     Heart burn    Prochlorperazine Edisylate      ANXIETY       Problem List:    Patient Active Problem List   Diagnosis Code    Anxiety F41.9    Allergic rhinitis J30.9    GERD (gastroesophageal reflux disease) K21.9    Depression F32.9    Breast mass N63.0    Migraine G43.909    Elderly primigravida, antepartum O09.519    Pre-op exam Z01.818       Past Medical History:        Diagnosis Date    ADD (attention deficit disorder)     Allergic rhinitis     Anxiety     Depression     GERD (gastroesophageal reflux disease)     Herpes simplex virus type 1 (HSV-1) dermatitis     oral only.  Migraines        Past Surgical History:        Procedure Laterality Date    BREAST BIOPSY      BREAST LUMPECTOMY Left 12/2014    DIL[facility]21/2016     [facility]    TONSILLECTOMY AND ADENOIDECTOMY      UPPER GASTROINTESTINAL ENDOSCOPY  9/9/08    DR Carl Phillips    UPPER GASTROINTESTINAL ENDOSCOPY  8/27/10    DR Carl Phillips       Social History:    Social History     Tobacco Use    Smoking status: Former Smoker    Smokeless tobacco: Never Used   Substance Use Topics    Alcohol use: Yes     Alcohol/week: 0.0 standard drinks     Comment: occ                                Counseling given: Not Answered      Vital Signs (Current): There were no vitals filed for this visit.                                            BP Readings from Last 3 Encounters:   06/21/21 116/70   06/03/21 116/84   05/12/21 102/60       NPO Status:                                                                                 BMI:   Wt Readings from Last 3 Encounters:   06/21/21 169 lb 9.6 oz (76.9 kg)   06/03/21 169 lb (76.7 kg)   05/12/21 164 lb (74.4 kg)     There is no height or weight on file to calculate BMI.    CBC:   Lab Results   Component Value Date    WBC 5.8 06/21/2021    RBC 4.50 06/21/2021    RBC 4.57 12/06/2018    HGB 14.0 06/21/2021    HCT 41.8 06/21/2021    MCV 92.8 06/21/2021    RDW 13.9 06/21/2021     06/21/2021       CMP:   Lab Results   Component Value Date     02/18/2021    K 4.3 02/18/2021     02/18/2021    CO2 19 03/14/2019    BUN 18 03/14/2019    CREATININE 0.81 02/18/2021    GFRAA >60 02/18/2021    AGRATIO 1.7 09/18/2018    LABGLOM >60 02/18/2021    GLUCOSE 160 02/18/2021    PROT 7.2 02/17/2021    CALCIUM 9.5 02/18/2021    BILITOT 0.2 02/17/2021    ALKPHOS 68 02/17/2021    AST 17 02/17/2021    ALT 17 02/17/2021       POC Tests: No results for input(s): POCGLU, POCNA, POCK, POCCL, POCBUN, POCHEMO, POCHCT in the last 72 hours.     Coags:   Lab Results   Component Value Date    PROTIME 12.7 04/22/2021    INR 0.9 04/22/2021    APTT 28.0 04/22/2021       HCG (If Applicable):   Lab Results   Component Value Date    PREGTESTUR Negative 12/06/2018    PREGSERUM Negative 07/10/2018        ABGs: No results found for: PHART, PO2ART, NFD9RWM, QFC4VTV, BEART, D3PSFSBI     Type & Screen (If Applicable):  No results found for: LABABO, LABRH    Drug/Infectious Status (If Applicable):  No results found for: HIV, HEPCAB    COVID-19 Screening (If Applicable):   Lab Results   Component Value Date    COVID19 NOT DETECTED 02/15/2021           Anesthesia Evaluation  Patient summary reviewed and Nursing notes reviewed no history of anesthetic complications:   Airway: Mallampati: II  TM distance: >3 FB   Neck ROM: full  Mouth opening: > = 3 FB Dental: normal exam         Pulmonary:Negative Pulmonary ROS and normal exam                               Cardiovascular:Negative CV ROS  Exercise tolerance: good (>4 METS),         ECG reviewed               Beta Blocker:  Not on Beta Blocker         Neuro/Psych:   Negative Neuro/Psych ROS              GI/Hepatic/Renal: Neg GI/Hepatic/Renal ROS            Endo/Other: Negative Endo/Other ROS             Pt had PAT visit. Abdominal:             Vascular: negative vascular ROS. Other Findings:             Anesthesia Plan      general and regional     ASA 2     (ETT)  Induction: intravenous. MIPS: Postoperative opioids intended and Prophylactic antiemetics administered. Anesthetic plan and risks discussed with patient. Plan discussed with CRNA.     Attending anesthesiologist reviewed and agrees with Pre Eval content              Malaika Mauricio MD   6/27/2021

## 2021-06-28 ENCOUNTER — ANESTHESIA (OUTPATIENT)
Dept: OPERATING ROOM | Age: 51
End: 2021-06-28
Payer: COMMERCIAL

## 2021-06-28 ENCOUNTER — HOSPITAL ENCOUNTER (OUTPATIENT)
Age: 51
Setting detail: OUTPATIENT SURGERY
Discharge: HOME | End: 2021-06-28
Attending: OBSTETRICS & GYNECOLOGY | Admitting: OBSTETRICS & GYNECOLOGY
Payer: COMMERCIAL

## 2021-06-28 VITALS — DIASTOLIC BLOOD PRESSURE: 55 MMHG | TEMPERATURE: 55.8 F | SYSTOLIC BLOOD PRESSURE: 106 MMHG | OXYGEN SATURATION: 100 %

## 2021-06-28 VITALS
HEIGHT: 63 IN | RESPIRATION RATE: 20 BRPM | OXYGEN SATURATION: 99 % | HEART RATE: 61 BPM | BODY MASS INDEX: 29.95 KG/M2 | SYSTOLIC BLOOD PRESSURE: 117 MMHG | WEIGHT: 169 LBS | DIASTOLIC BLOOD PRESSURE: 56 MMHG | TEMPERATURE: 97.2 F

## 2021-06-28 DIAGNOSIS — G89.18 POSTOPERATIVE PAIN: Primary | ICD-10-CM

## 2021-06-28 LAB
ABO/RH: NORMAL
ANTIBODY SCREEN: NORMAL
HCG, URINE, POC: NEGATIVE
Lab: NORMAL
NEGATIVE QC PASS/FAIL: NORMAL
POSITIVE QC PASS/FAIL: NORMAL

## 2021-06-28 PROCEDURE — 2720000010 HC SURG SUPPLY STERILE: Performed by: OBSTETRICS & GYNECOLOGY

## 2021-06-28 PROCEDURE — 64486 TAP BLOCK UNIL BY INJECTION: CPT

## 2021-06-28 PROCEDURE — 6360000002 HC RX W HCPCS: Performed by: NURSE ANESTHETIST, CERTIFIED REGISTERED

## 2021-06-28 PROCEDURE — 3600000014 HC SURGERY LEVEL 4 ADDTL 15MIN: Performed by: OBSTETRICS & GYNECOLOGY

## 2021-06-28 PROCEDURE — 2500000003 HC RX 250 WO HCPCS: Performed by: NURSE ANESTHETIST, CERTIFIED REGISTERED

## 2021-06-28 PROCEDURE — 86900 BLOOD TYPING SEROLOGIC ABO: CPT

## 2021-06-28 PROCEDURE — 6370000000 HC RX 637 (ALT 250 FOR IP): Performed by: OBSTETRICS & GYNECOLOGY

## 2021-06-28 PROCEDURE — 3700000000 HC ANESTHESIA ATTENDED CARE: Performed by: OBSTETRICS & GYNECOLOGY

## 2021-06-28 PROCEDURE — 6360000002 HC RX W HCPCS: Performed by: ANESTHESIOLOGY

## 2021-06-28 PROCEDURE — 86901 BLOOD TYPING SEROLOGIC RH(D): CPT

## 2021-06-28 PROCEDURE — 2580000003 HC RX 258: Performed by: ANESTHESIOLOGY

## 2021-06-28 PROCEDURE — 3600000004 HC SURGERY LEVEL 4 BASE: Performed by: OBSTETRICS & GYNECOLOGY

## 2021-06-28 PROCEDURE — 3700000001 HC ADD 15 MINUTES (ANESTHESIA): Performed by: OBSTETRICS & GYNECOLOGY

## 2021-06-28 PROCEDURE — 6360000002 HC RX W HCPCS: Performed by: OBSTETRICS & GYNECOLOGY

## 2021-06-28 PROCEDURE — 58571 TLH W/T/O 250 G OR LESS: CPT | Performed by: OBSTETRICS & GYNECOLOGY

## 2021-06-28 PROCEDURE — 7100000001 HC PACU RECOVERY - ADDTL 15 MIN: Performed by: OBSTETRICS & GYNECOLOGY

## 2021-06-28 PROCEDURE — 2709999900 HC NON-CHARGEABLE SUPPLY: Performed by: OBSTETRICS & GYNECOLOGY

## 2021-06-28 PROCEDURE — 7100000011 HC PHASE II RECOVERY - ADDTL 15 MIN: Performed by: OBSTETRICS & GYNECOLOGY

## 2021-06-28 PROCEDURE — 7100000000 HC PACU RECOVERY - FIRST 15 MIN: Performed by: OBSTETRICS & GYNECOLOGY

## 2021-06-28 PROCEDURE — 6370000000 HC RX 637 (ALT 250 FOR IP): Performed by: ANESTHESIOLOGY

## 2021-06-28 PROCEDURE — 7100000010 HC PHASE II RECOVERY - FIRST 15 MIN: Performed by: OBSTETRICS & GYNECOLOGY

## 2021-06-28 PROCEDURE — 88307 TISSUE EXAM BY PATHOLOGIST: CPT

## 2021-06-28 PROCEDURE — 57288 REPAIR BLADDER DEFECT: CPT | Performed by: OBSTETRICS & GYNECOLOGY

## 2021-06-28 PROCEDURE — C1771 REP DEV, URINARY, W/SLING: HCPCS | Performed by: OBSTETRICS & GYNECOLOGY

## 2021-06-28 PROCEDURE — 2500000003 HC RX 250 WO HCPCS: Performed by: ANESTHESIOLOGY

## 2021-06-28 PROCEDURE — 2500000003 HC RX 250 WO HCPCS: Performed by: OBSTETRICS & GYNECOLOGY

## 2021-06-28 PROCEDURE — 76942 ECHO GUIDE FOR BIOPSY: CPT | Performed by: ANESTHESIOLOGY

## 2021-06-28 PROCEDURE — 86850 RBC ANTIBODY SCREEN: CPT

## 2021-06-28 PROCEDURE — 2580000003 HC RX 258: Performed by: OBSTETRICS & GYNECOLOGY

## 2021-06-28 DEVICE — SLING INCONT TENS FRE SUPP FOR OBT SYS GYNECARE TVT: Type: IMPLANTABLE DEVICE | Site: BLADDER | Status: FUNCTIONAL

## 2021-06-28 RX ORDER — ONDANSETRON 2 MG/ML
INJECTION INTRAMUSCULAR; INTRAVENOUS PRN
Status: DISCONTINUED | OUTPATIENT
Start: 2021-06-28 | End: 2021-06-28 | Stop reason: SDUPTHER

## 2021-06-28 RX ORDER — MIDAZOLAM HYDROCHLORIDE 2 MG/2ML
INJECTION, SOLUTION INTRAMUSCULAR; INTRAVENOUS PRN
Status: DISCONTINUED | OUTPATIENT
Start: 2021-06-28 | End: 2021-06-28 | Stop reason: SDUPTHER

## 2021-06-28 RX ORDER — BUPIVACAINE HYDROCHLORIDE 2.5 MG/ML
INJECTION, SOLUTION EPIDURAL; INFILTRATION; INTRACAUDAL
Status: COMPLETED | OUTPATIENT
Start: 2021-06-28 | End: 2021-06-28

## 2021-06-28 RX ORDER — FENTANYL CITRATE 50 UG/ML
INJECTION, SOLUTION INTRAMUSCULAR; INTRAVENOUS PRN
Status: DISCONTINUED | OUTPATIENT
Start: 2021-06-28 | End: 2021-06-28 | Stop reason: SDUPTHER

## 2021-06-28 RX ORDER — ONDANSETRON 4 MG/1
4 TABLET, ORALLY DISINTEGRATING ORAL EVERY 8 HOURS PRN
Status: CANCELLED | OUTPATIENT
Start: 2021-06-28

## 2021-06-28 RX ORDER — ROCURONIUM BROMIDE 10 MG/ML
INJECTION, SOLUTION INTRAVENOUS PRN
Status: DISCONTINUED | OUTPATIENT
Start: 2021-06-28 | End: 2021-06-28 | Stop reason: SDUPTHER

## 2021-06-28 RX ORDER — 0.9 % SODIUM CHLORIDE 0.9 %
500 INTRAVENOUS SOLUTION INTRAVENOUS
Status: DISCONTINUED | OUTPATIENT
Start: 2021-06-28 | End: 2021-06-28 | Stop reason: HOSPADM

## 2021-06-28 RX ORDER — HYDROCODONE BITARTRATE AND ACETAMINOPHEN 5; 325 MG/1; MG/1
2 TABLET ORAL PRN
Status: COMPLETED | OUTPATIENT
Start: 2021-06-28 | End: 2021-06-28

## 2021-06-28 RX ORDER — SODIUM CHLORIDE 9 MG/ML
25 INJECTION, SOLUTION INTRAVENOUS PRN
Status: CANCELLED | OUTPATIENT
Start: 2021-06-28

## 2021-06-28 RX ORDER — ONDANSETRON 2 MG/ML
4 INJECTION INTRAMUSCULAR; INTRAVENOUS
Status: COMPLETED | OUTPATIENT
Start: 2021-06-28 | End: 2021-06-28

## 2021-06-28 RX ORDER — LABETALOL HYDROCHLORIDE 5 MG/ML
5 INJECTION, SOLUTION INTRAVENOUS EVERY 10 MIN PRN
Status: DISCONTINUED | OUTPATIENT
Start: 2021-06-28 | End: 2021-06-28 | Stop reason: HOSPADM

## 2021-06-28 RX ORDER — KETOROLAC TROMETHAMINE 30 MG/ML
INJECTION, SOLUTION INTRAMUSCULAR; INTRAVENOUS PRN
Status: DISCONTINUED | OUTPATIENT
Start: 2021-06-28 | End: 2021-06-28 | Stop reason: SDUPTHER

## 2021-06-28 RX ORDER — KETOROLAC TROMETHAMINE 30 MG/ML
30 INJECTION, SOLUTION INTRAMUSCULAR; INTRAVENOUS EVERY 6 HOURS
Status: CANCELLED | OUTPATIENT
Start: 2021-06-28 | End: 2021-06-30

## 2021-06-28 RX ORDER — SODIUM CHLORIDE, SODIUM LACTATE, POTASSIUM CHLORIDE, CALCIUM CHLORIDE 600; 310; 30; 20 MG/100ML; MG/100ML; MG/100ML; MG/100ML
INJECTION, SOLUTION INTRAVENOUS CONTINUOUS PRN
Status: COMPLETED | OUTPATIENT
Start: 2021-06-28 | End: 2021-06-28

## 2021-06-28 RX ORDER — FENTANYL CITRATE 50 UG/ML
25 INJECTION, SOLUTION INTRAMUSCULAR; INTRAVENOUS EVERY 5 MIN PRN
Status: DISCONTINUED | OUTPATIENT
Start: 2021-06-28 | End: 2021-06-28 | Stop reason: HOSPADM

## 2021-06-28 RX ORDER — MEPERIDINE HYDROCHLORIDE 25 MG/ML
12.5 INJECTION INTRAMUSCULAR; INTRAVENOUS; SUBCUTANEOUS EVERY 5 MIN PRN
Status: DISCONTINUED | OUTPATIENT
Start: 2021-06-28 | End: 2021-06-28 | Stop reason: HOSPADM

## 2021-06-28 RX ORDER — LIDOCAINE HYDROCHLORIDE 20 MG/ML
JELLY TOPICAL PRN
Status: DISCONTINUED | OUTPATIENT
Start: 2021-06-28 | End: 2021-06-28 | Stop reason: ALTCHOICE

## 2021-06-28 RX ORDER — SODIUM CHLORIDE 0.9 % (FLUSH) 0.9 %
5-40 SYRINGE (ML) INJECTION EVERY 12 HOURS SCHEDULED
Status: CANCELLED | OUTPATIENT
Start: 2021-06-28

## 2021-06-28 RX ORDER — MAGNESIUM HYDROXIDE 1200 MG/15ML
LIQUID ORAL CONTINUOUS PRN
Status: COMPLETED | OUTPATIENT
Start: 2021-06-28 | End: 2021-06-28

## 2021-06-28 RX ORDER — HYDROCODONE BITARTRATE AND ACETAMINOPHEN 5; 325 MG/1; MG/1
1 TABLET ORAL PRN
Status: COMPLETED | OUTPATIENT
Start: 2021-06-28 | End: 2021-06-28

## 2021-06-28 RX ORDER — SODIUM CHLORIDE, SODIUM LACTATE, POTASSIUM CHLORIDE, CALCIUM CHLORIDE 600; 310; 30; 20 MG/100ML; MG/100ML; MG/100ML; MG/100ML
INJECTION, SOLUTION INTRAVENOUS CONTINUOUS
Status: CANCELLED | OUTPATIENT
Start: 2021-06-28

## 2021-06-28 RX ORDER — WATER 1000 ML/1000ML
INJECTION, SOLUTION INTRAVENOUS PRN
Status: DISCONTINUED | OUTPATIENT
Start: 2021-06-28 | End: 2021-06-28 | Stop reason: ALTCHOICE

## 2021-06-28 RX ORDER — DIPHENHYDRAMINE HYDROCHLORIDE 50 MG/ML
12.5 INJECTION INTRAMUSCULAR; INTRAVENOUS
Status: DISCONTINUED | OUTPATIENT
Start: 2021-06-28 | End: 2021-06-28 | Stop reason: HOSPADM

## 2021-06-28 RX ORDER — CEFAZOLIN SODIUM 2 G/50ML
2000 SOLUTION INTRAVENOUS
Status: COMPLETED | OUTPATIENT
Start: 2021-06-28 | End: 2021-06-28

## 2021-06-28 RX ORDER — OXYCODONE HYDROCHLORIDE AND ACETAMINOPHEN 5; 325 MG/1; MG/1
1 TABLET ORAL EVERY 6 HOURS PRN
Qty: 20 TABLET | Refills: 0 | Status: SHIPPED | OUTPATIENT
Start: 2021-06-28 | End: 2021-07-03

## 2021-06-28 RX ORDER — SODIUM CHLORIDE, SODIUM LACTATE, POTASSIUM CHLORIDE, CALCIUM CHLORIDE 600; 310; 30; 20 MG/100ML; MG/100ML; MG/100ML; MG/100ML
INJECTION, SOLUTION INTRAVENOUS CONTINUOUS
Status: DISCONTINUED | OUTPATIENT
Start: 2021-06-28 | End: 2021-06-28 | Stop reason: HOSPADM

## 2021-06-28 RX ORDER — PROPOFOL 10 MG/ML
INJECTION, EMULSION INTRAVENOUS PRN
Status: DISCONTINUED | OUTPATIENT
Start: 2021-06-28 | End: 2021-06-28 | Stop reason: SDUPTHER

## 2021-06-28 RX ORDER — DEXAMETHASONE SODIUM PHOSPHATE 10 MG/ML
INJECTION INTRAMUSCULAR; INTRAVENOUS PRN
Status: DISCONTINUED | OUTPATIENT
Start: 2021-06-28 | End: 2021-06-28 | Stop reason: SDUPTHER

## 2021-06-28 RX ORDER — METOCLOPRAMIDE HYDROCHLORIDE 5 MG/ML
10 INJECTION INTRAMUSCULAR; INTRAVENOUS
Status: COMPLETED | OUTPATIENT
Start: 2021-06-28 | End: 2021-06-28

## 2021-06-28 RX ORDER — IBUPROFEN 400 MG/1
400 TABLET ORAL EVERY 6 HOURS PRN
Status: CANCELLED | OUTPATIENT
Start: 2021-06-28

## 2021-06-28 RX ORDER — ONDANSETRON 2 MG/ML
4 INJECTION INTRAMUSCULAR; INTRAVENOUS EVERY 6 HOURS PRN
Status: CANCELLED | OUTPATIENT
Start: 2021-06-28

## 2021-06-28 RX ORDER — SCOLOPAMINE TRANSDERMAL SYSTEM 1 MG/1
1 PATCH, EXTENDED RELEASE TRANSDERMAL
Status: DISCONTINUED | OUTPATIENT
Start: 2021-06-28 | End: 2021-06-28 | Stop reason: HOSPADM

## 2021-06-28 RX ORDER — SODIUM CHLORIDE 0.9 % (FLUSH) 0.9 %
5-40 SYRINGE (ML) INJECTION PRN
Status: CANCELLED | OUTPATIENT
Start: 2021-06-28

## 2021-06-28 RX ADMIN — HYDROCODONE BITARTRATE AND ACETAMINOPHEN 2 TABLET: 5; 325 TABLET ORAL at 10:32

## 2021-06-28 RX ADMIN — ONDANSETRON 4 MG: 2 INJECTION INTRAMUSCULAR; INTRAVENOUS at 09:40

## 2021-06-28 RX ADMIN — MIDAZOLAM HYDROCHLORIDE 2 MG: 1 INJECTION, SOLUTION INTRAMUSCULAR; INTRAVENOUS at 07:04

## 2021-06-28 RX ADMIN — SUGAMMADEX 200 MG: 100 INJECTION, SOLUTION INTRAVENOUS at 09:19

## 2021-06-28 RX ADMIN — ROCURONIUM BROMIDE 50 MG: 10 INJECTION INTRAVENOUS at 07:40

## 2021-06-28 RX ADMIN — MIDAZOLAM HYDROCHLORIDE 2 MG: 1 INJECTION, SOLUTION INTRAMUSCULAR; INTRAVENOUS at 07:27

## 2021-06-28 RX ADMIN — FENTANYL CITRATE 50 MCG: 50 INJECTION, SOLUTION INTRAMUSCULAR; INTRAVENOUS at 07:40

## 2021-06-28 RX ADMIN — BUPIVACAINE HYDROCHLORIDE 60 ML: 2.5 INJECTION, SOLUTION EPIDURAL; INFILTRATION; INTRACAUDAL; PERINEURAL at 07:00

## 2021-06-28 RX ADMIN — ONDANSETRON 4 MG: 2 INJECTION INTRAMUSCULAR; INTRAVENOUS at 08:47

## 2021-06-28 RX ADMIN — SODIUM CHLORIDE, POTASSIUM CHLORIDE, SODIUM LACTATE AND CALCIUM CHLORIDE: 600; 310; 30; 20 INJECTION, SOLUTION INTRAVENOUS at 08:03

## 2021-06-28 RX ADMIN — FENTANYL CITRATE 25 MCG: 50 INJECTION INTRAMUSCULAR; INTRAVENOUS at 09:53

## 2021-06-28 RX ADMIN — SODIUM CHLORIDE, POTASSIUM CHLORIDE, SODIUM LACTATE AND CALCIUM CHLORIDE: 600; 310; 30; 20 INJECTION, SOLUTION INTRAVENOUS at 06:51

## 2021-06-28 RX ADMIN — METOCLOPRAMIDE HYDROCHLORIDE 10 MG: 5 INJECTION INTRAMUSCULAR; INTRAVENOUS at 09:56

## 2021-06-28 RX ADMIN — FENTANYL CITRATE 25 MCG: 50 INJECTION INTRAMUSCULAR; INTRAVENOUS at 09:46

## 2021-06-28 RX ADMIN — KETOROLAC TROMETHAMINE 30 MG: 30 INJECTION, SOLUTION INTRAMUSCULAR; INTRAVENOUS at 09:13

## 2021-06-28 RX ADMIN — FENTANYL CITRATE 50 MCG: 50 INJECTION, SOLUTION INTRAMUSCULAR; INTRAVENOUS at 07:48

## 2021-06-28 RX ADMIN — CEFAZOLIN SODIUM 2000 MG: 2 SOLUTION INTRAVENOUS at 07:36

## 2021-06-28 RX ADMIN — PROPOFOL 150 MG: 10 INJECTION, EMULSION INTRAVENOUS at 07:40

## 2021-06-28 RX ADMIN — DEXAMETHASONE SODIUM PHOSPHATE 10 MG: 10 INJECTION INTRAMUSCULAR; INTRAVENOUS at 08:09

## 2021-06-28 ASSESSMENT — PULMONARY FUNCTION TESTS
PIF_VALUE: 13
PIF_VALUE: 13
PIF_VALUE: 22
PIF_VALUE: 13
PIF_VALUE: 23
PIF_VALUE: 24
PIF_VALUE: 13
PIF_VALUE: 2
PIF_VALUE: 23
PIF_VALUE: 4
PIF_VALUE: 16
PIF_VALUE: 4
PIF_VALUE: 15
PIF_VALUE: 12
PIF_VALUE: 23
PIF_VALUE: 23
PIF_VALUE: 4
PIF_VALUE: 23
PIF_VALUE: 15
PIF_VALUE: 23
PIF_VALUE: 4
PIF_VALUE: 16
PIF_VALUE: 14
PIF_VALUE: 23
PIF_VALUE: 13
PIF_VALUE: 15
PIF_VALUE: 16
PIF_VALUE: 23
PIF_VALUE: 21
PIF_VALUE: 12
PIF_VALUE: 16
PIF_VALUE: 14
PIF_VALUE: 22
PIF_VALUE: 23
PIF_VALUE: 16
PIF_VALUE: 13
PIF_VALUE: 13
PIF_VALUE: 16
PIF_VALUE: 23
PIF_VALUE: 16
PIF_VALUE: 3
PIF_VALUE: 24
PIF_VALUE: 16
PIF_VALUE: 13
PIF_VALUE: 13
PIF_VALUE: 14
PIF_VALUE: 13
PIF_VALUE: 4
PIF_VALUE: 13
PIF_VALUE: 24
PIF_VALUE: 13
PIF_VALUE: 14
PIF_VALUE: 24
PIF_VALUE: 22
PIF_VALUE: 12
PIF_VALUE: 16
PIF_VALUE: 23
PIF_VALUE: 19
PIF_VALUE: 14
PIF_VALUE: 13
PIF_VALUE: 14
PIF_VALUE: 12
PIF_VALUE: 17
PIF_VALUE: 24
PIF_VALUE: 19
PIF_VALUE: 24
PIF_VALUE: 13
PIF_VALUE: 16
PIF_VALUE: 13
PIF_VALUE: 15
PIF_VALUE: 16
PIF_VALUE: 19
PIF_VALUE: 22
PIF_VALUE: 1
PIF_VALUE: 3
PIF_VALUE: 20
PIF_VALUE: 16
PIF_VALUE: 24
PIF_VALUE: 16
PIF_VALUE: 16
PIF_VALUE: 1
PIF_VALUE: 15
PIF_VALUE: 17
PIF_VALUE: 23
PIF_VALUE: 16
PIF_VALUE: 16
PIF_VALUE: 13
PIF_VALUE: 13
PIF_VALUE: 23
PIF_VALUE: 23
PIF_VALUE: 13
PIF_VALUE: 23
PIF_VALUE: 17
PIF_VALUE: 23
PIF_VALUE: 14
PIF_VALUE: 10
PIF_VALUE: 15
PIF_VALUE: 12
PIF_VALUE: 12
PIF_VALUE: 13
PIF_VALUE: 15
PIF_VALUE: 13
PIF_VALUE: 15

## 2021-06-28 ASSESSMENT — PAIN SCALES - GENERAL
PAINLEVEL_OUTOF10: 6
PAINLEVEL_OUTOF10: 4
PAINLEVEL_OUTOF10: 3
PAINLEVEL_OUTOF10: 6
PAINLEVEL_OUTOF10: 2
PAINLEVEL_OUTOF10: 3
PAINLEVEL_OUTOF10: 4
PAINLEVEL_OUTOF10: 3

## 2021-06-28 ASSESSMENT — PAIN DESCRIPTION - PAIN TYPE
TYPE: SURGICAL PAIN

## 2021-06-28 ASSESSMENT — PAIN DESCRIPTION - LOCATION
LOCATION: ABDOMEN

## 2021-06-28 ASSESSMENT — PAIN DESCRIPTION - DESCRIPTORS
DESCRIPTORS: CRAMPING

## 2021-06-28 NOTE — ANESTHESIA POSTPROCEDURE EVALUATION
Department of Anesthesiology  Postprocedure Note    Patient: Leanne Canela  MRN: [id number]  YOB: 1970  Date of evaluation: 6/28/2021  Time:  9:33 AM     Procedure Summary     Date: 06/28/21 Room / Location: [address]    Anesthesia Start: 0730 Anesthesia Stop:     Procedures:       LAVH BS (N/A Abdomen)      TVTO CYSTOSCOPY (N/A Bladder) Diagnosis: (PMB, FAILED ABLATION, RHIANNON HYPERMOBLE URETHRA)    Surgeons: David Lugo DO Responsible Provider: Tanvir Gerard MD    Anesthesia Type: general, regional ASA Status: 2          Anesthesia Type: No value filed. Nancy Phase I: Nancy Score: 10    Nancy Phase II:      Last vitals: Reviewed and per EMR flowsheets.        Anesthesia Post Evaluation    Patient location during evaluation: bedside  Patient participation: complete - patient participated  Level of consciousness: awake and awake and alert  Pain score: 0  Airway patency: patent  Nausea & Vomiting: no nausea and no vomiting  Complications: no  Cardiovascular status: blood pressure returned to baseline and hemodynamically stable  Respiratory status: acceptable  Hydration status: euvolemic

## 2021-06-28 NOTE — OP NOTE
Operative Report    PATIENT NAME: Amandeep Simons RECORD NO. [id number]  SURGEON: Srini Linares  PRIMARY CARE PHYSICIAN: Siobhan Salazar MD     PROCEDURE PERFORMED:  6/28/2021  PREOPERATIVE DIAGNOSIS: Pelvic pain, abnormal bleeding. Failed endometrial ablation. Stress urinary incontinence with a hypermobile urethra  POSTOPERATIVE DIAGNOSIS: Same   PROCEDURE PERFORMED:  LAVH WITH BILATERAL SALPINGECTOMY TVT O and a cystoscopy  SURGEON:  Dr. Cj Benson:  GET  ESTIMATED BLOOD LOSS: Less than 100 ml  SPECIMEN: Uterus fallopian tube segments  COMPLICATIONS:  None immediately appreciated. DISCUSSION: 54 y.o. [id number] who presents today with h/o pelvic pain abnormal bleeding failed endometrial ablation  stress urinary incontinence. The patient has a h/o trial of medical therapy without relief of symptoms. Patient also had a endometrial ablation that was ineffective. Risks, benefits and alternative therapies for treatment were discussed with the patient. The patient desires definitive surgical management and the patient is planned for hysterectomy. The patient understands the risk of possible open hysterectomy and the patient desires ovarian conservation if possible. Patient has a hypermobile hypermobile urethra and the plan is to perform TVT O we discussed those risks as well including urinary retention and complete cessation of the stress incontinence increase in urge incontinence    PreOp Dx: Pelvic pain abnormal bleeding failed ablation stress urinary incontinence  PostOp Dx: Same  Procedure: LAVH BS TVT O cystoscopy  Surgeon:MOLLY MATHEW DO  Anesthesia: GET  EBL: Less than 100 cc  Specimens: Uterus, Cervix and bilateral fallopian tubes sent to pathology. Uterine weight in OR: 79 g    The patient was taken to the OR where she was prepped and draped in the normal sterile fashion in a dorsal lithotomy position. Time out was then taken to ensure proper patient, placement and procedure. the peritoneum the uterosacral ligaments and the cardinal ligaments. The remaining vaginal cuff was closed using a running locking closure. Excellent hemostasis was noted. The vagina was irrigated and then cleansed. Attention was returned to the patients abdomen. Pneumoperitoneum was recreated. Bilateral ovaries were noted to be normal.  Vascular pedicles and vaginal cuff were hemostatic. The pressure in the pelvis was let down to less than 5 mmHg and no bleeding was noted. The ureters were visualized bilaterally and peristalsis was seen. There was no evidence of bowel bladder or ureter injury. Surgicel was applied to the surgical bed. The patient's abdomen was then deflated, again ensuring excellent hemostasis, and the trochars were removed under direct visualization. The patient's skin incision were closed with a subcuticular stitch utilizing 4-0 Vicryl  Cytoscopy was  performed after the TVT O no tape material in the bladder bilateral ureteral reflux noted. The TVT O was performed in a standard fashion. The areas of the exit points were marked. A small incision was made in the suburethral area centimeters away from the urethral opening Metzenbaum scissors were used to tunnel up to the obturator foramen the submucosal guide was used the trochars were then placed in the desired location out the desired exit points #9 dilator was placed between the tape and the urethra the Henriquez was removed cystoscopy was performed the Henriquez was then reinserted trochars were removed sleeves were removed and then the dilator was removed. The mid urethral incision was then closed using 3 figure-of-eight stitches. Stasis was assured. The patient was then allowed to emerge from anesthesia and was taken to the recovery room in stable condition condition  Lap and needle counts were correct x 3, vital signs were stable throughout.     Christoph Tavares DO

## 2021-06-28 NOTE — ANESTHESIA PROCEDURE NOTES
Peripheral Block    Patient location during procedure: pre-op  Start time: 6/28/2021 7:00 AM  End time: 6/28/2021 7:10 AM  Staffing  Performed: anesthesiologist   Anesthesiologist: Hattie Mckeon MD  Preanesthetic Checklist  Completed: patient identified, IV checked, site marked, risks and benefits discussed, surgical consent, monitors and equipment checked, pre-op evaluation, timeout performed, anesthesia consent given, oxygen available and patient being monitored  Peripheral Block  Patient position: supine  Prep: ChloraPrep  Patient monitoring: cardiac monitor, continuous pulse ox, frequent blood pressure checks and IV access  Block type: Erector spinae (T12)  Laterality: bilateral  Injection technique: single-shot  Guidance: nerve stimulator and ultrasound guided  Local infiltration: bupivacaine  Infiltration strength: 0.25 %  Dose: 60 mL  Provider prep: mask and sterile gloves (Sterile probe cover)  Local infiltration: bupivacaine  Needle  Needle type: combined needle/nerve stimulator   Needle gauge: 22 G  Needle length: 5 cm  Needle localization: anatomical landmarks and ultrasound guidance  Assessment  Injection assessment: negative aspiration for heme, no paresthesia on injection and local visualized surrounding nerve on ultrasound  Paresthesia pain: immediately resolved  Slow fractionated injection: yes  Hemodynamics: stable  Additional Notes  Ultrasound image printed and saved in patient chart.     Sterile probe cover used    Medications Administered  Bupivacaine (PF) (MARCAINE) 0.25 % injection, 60 mL  Reason for block: post-op pain management and at surgeon's request

## 2021-06-28 NOTE — PROGRESS NOTES
Pt to phase II. Has two abdominal surgical incisions which are dermabonded and clean dry and intact. She also has two small surgical incisions to upper inner thigh which are also dry and intact.

## 2021-06-28 NOTE — H&P
Department of Obstetrics and Gynecology   History & Physical    Pt Name: Xi Briceño  MRN: [id number] [place] #: [de-identified]  YOB: 1970  Estimated Date of Delivery: None noted. HPI: The patient is a 54 y.o. [id number] female  who presents for [facility] TVTO for AUB, pelvic pain RHIANNON    Allergies:     Allergies as of 06/03/2021 - Fully Reviewed 06/03/2021   Allergen Reaction Noted    Doxycycline Other (See Comments) 06/01/2012    Prochlorperazine edisylate  06/01/2012       Medications:    Current Facility-Administered Medications:     ceFAZolin (ANCEF) 2000 mg in dextrose 3 % 50 mL IVPB (duplex), 2,000 mg, Intravenous, On Call to OR, Alejandro Campos DO    metronidazole (FLAGYL) 500 mg in NaCl 100 mL IVPB premix, 500 mg, Intravenous, On Call to OR, Alejandro Campos DO    scopolamine (TRANSDERM-SCOP) transdermal patch 1 patch, 1 patch, Transdermal, On Call to OR, Alejandro Campos DO, 1 patch at 06/28/21 3458    lactated ringers infusion, , Intravenous, Continuous, Anatoliy Higgins MD, Last Rate: 100 mL/hr at 06/28/21 0651, New Bag at 06/28/21 0651    meperidine (DEMEROL) injection 12.5 mg, 12.5 mg, Intravenous, Q5 Min PRN, Anatoliy Higgins MD    fentaNYL (SUBLIMAZE) injection 25 mcg, 25 mcg, Intravenous, Q5 Min PRN, Anatoliy Higgins MD    HYDROmorphone (DILAUDID) injection 0.5 mg, 0.5 mg, Intravenous, Q5 Min PRN, Anatoliy Higgins MD    HYDROcodone-acetaminophen (NORCO) 5-325 MG per tablet 1 tablet, 1 tablet, Oral, PRN **OR** HYDROcodone-acetaminophen (NORCO) 5-325 MG per tablet 2 tablet, 2 tablet, Oral, PRN, Anatoliy Higgins MD    ondansetron [facility]) injection 4 mg, 4 mg, Intravenous, Once PRN, Anatoliy Higgins MD    0.9 % sodium chloride bolus, 500 mL, Intravenous, Once PRN, Anatoliy Higgins MD    diphenhydrAMINE (BENADRYL) injection 12.5 mg, 12.5 mg, Intravenous, Once PRN, Anatoliy Higgins MD    labetalol (NORMODYNE;TRANDATE) injection 5 mg, 5 mg, Intravenous, Q10 Min PRN, Fabio Julien Seymour Sim MD    metoclopramide [facility]) injection 10 mg, 10 mg, Intravenous, Once PRN, Martin Almendarez MD    OB History: [id number]    Gyn History: Denies h/o abnormal pap smear, h/o STDs. Past Medical History:   Past Medical History:   Diagnosis Date    ADD (attention deficit disorder)     Allergic rhinitis     Anxiety     Depression     GERD (gastroesophageal reflux disease)     Herpes simplex virus type 1 (HSV-1) dermatitis     oral only.  Migraines     PONV (postoperative nausea and vomiting)        Past Surgical History:   Past Surgical History:   Procedure Laterality Date    BREAST BIOPSY      BREAST LUMPECTOMY Left 12/2014    DIL[facility]21/2016     [facility]    TONSILLECTOMY AND ADENOIDECTOMY      UPPER GASTROINTESTINAL ENDOSCOPY  9/9/08    DR Marzette Landau    UPPER GASTROINTESTINAL ENDOSCOPY  8/27/10    DR Marzette Landau       Social History:   Social History     Tobacco Use   Smoking Status Former Smoker   Smokeless Tobacco Never Used        Family History: Noncontributory; Denies h/o cancer. ROS:  Negative except as stated in HPI, denies nausea, vomiting, fever, chills, headache or dysuria. PE:  Vitals:    06/28/21 0600   BP: 131/61   Pulse: 66   Resp: 18   Temp: 97 °F (36.1 °C)   SpO2: 98%       General: well nourished, well developed, in no acute distress  CV: Normal heart sounds  Resp: breathing unlabored  Abdomen: Nontender, no rebound, no guarding  FH:  Cx:    NST - Cat 1: FHR 140s, moderate variability, +accels, -decels    Labs:       Assessment:   54 y.o. [id number] female with pelvic pain, AUB, failed ablation.  RHIANNON    Plan: [facility] BS 1800 [address]

## 2021-07-08 ENCOUNTER — OFFICE VISIT (OUTPATIENT)
Dept: OBGYN CLINIC | Age: 51
End: 2021-07-08
Payer: COMMERCIAL

## 2021-07-08 VITALS
HEART RATE: 88 BPM | WEIGHT: 169 LBS | BODY MASS INDEX: 29.94 KG/M2 | SYSTOLIC BLOOD PRESSURE: 98 MMHG | DIASTOLIC BLOOD PRESSURE: 76 MMHG

## 2021-07-08 DIAGNOSIS — R30.0 DYSURIA: ICD-10-CM

## 2021-07-08 DIAGNOSIS — R30.0 DYSURIA: Primary | ICD-10-CM

## 2021-07-08 DIAGNOSIS — Z09 POSTOPERATIVE EXAMINATION: ICD-10-CM

## 2021-07-08 LAB
BACTERIA: ABNORMAL /HPF
BILIRUBIN URINE: NEGATIVE
BLOOD, URINE: ABNORMAL
CLARITY: CLEAR
COLOR: YELLOW
EPITHELIAL CELLS, UA: ABNORMAL /HPF (ref 0–5)
GLUCOSE URINE: NEGATIVE MG/DL
HYALINE CASTS: ABNORMAL /HPF (ref 0–5)
KETONES, URINE: NEGATIVE MG/DL
LEUKOCYTE ESTERASE, URINE: ABNORMAL
NITRITE, URINE: NEGATIVE
PH UA: 6 (ref 5–9)
PROTEIN UA: NEGATIVE MG/DL
RBC UA: ABNORMAL /HPF (ref 0–5)
SPECIFIC GRAVITY UA: 1.01 (ref 1–1.03)
UROBILINOGEN, URINE: 0.2 E.U./DL
WBC UA: ABNORMAL /HPF (ref 0–5)

## 2021-07-08 PROCEDURE — 99213 OFFICE O/P EST LOW 20 MIN: CPT | Performed by: OBSTETRICS & GYNECOLOGY

## 2021-07-08 ASSESSMENT — ENCOUNTER SYMPTOMS
VOMITING: 0
BACK PAIN: 0
CONSTIPATION: 0
COUGH: 0
CHEST TIGHTNESS: 0
SHORTNESS OF BREATH: 0
COLOR CHANGE: 0
WHEEZING: 0
ABDOMINAL DISTENTION: 0
NAUSEA: 0
TROUBLE SWALLOWING: 0
BLOOD IN STOOL: 0
SORE THROAT: 0
ABDOMINAL PAIN: 0
VOICE CHANGE: 0

## 2021-07-08 NOTE — PROGRESS NOTES
HPI:  Leora Sidhu (: 1970) is a 54 y.o. female, Established patient, here for evaluation of the following chief complaint(s):  Post-Op Check (spotting on pantyliner daily and has intermittant cramping. denies constipation)  Patient had LAVH bilateral salpingectomy Path showed adenomyosis she also had a transvaginal tape she is voiding she is not having any incontinence but she feels like she is not emptying her bladder completely. Dysuria no hematuria bowel function is normal      SUBJECTIVE/OBJECTIVE:    Past Surgical History:   Procedure Laterality Date    BLADDER SUSPENSION N/A 2021    TVTO CYSTOSCOPY performed by Marco Soares DO at [address] BREAST LUMPECTOMY Left 2014    DILATION AND CURETTAGE OF UTERUS  2016    DR Enma Garcia    HYSTERECTOMY, VAGINAL N/A 2021    LAVH BS performed by Marco Soares DO at [address] [facility] ENDOSCOPY  08    DR Lydia Alford    UPPER GASTROINTESTINAL ENDOSCOPY  8/27/10    DR CUMMINS        Review of Systems   Constitutional: Negative for activity change, appetite change, fatigue and unexpected weight change. HENT: Negative for dental problem, ear pain, hearing loss, nosebleeds, sore throat, trouble swallowing and voice change. Eyes: Negative for visual disturbance. Respiratory: Negative for cough, chest tightness, shortness of breath and wheezing. Cardiovascular: Negative for chest pain and palpitations. Gastrointestinal: Negative for abdominal distention, abdominal pain, blood in stool, constipation, nausea and vomiting. Endocrine: Negative for cold intolerance, heat intolerance, polydipsia, polyphagia and polyuria. Genitourinary: Negative for difficulty urinating, dyspareunia, dysuria, flank pain, frequency, genital sores, hematuria, menstrual problem, pelvic pain, urgency, vaginal bleeding, vaginal discharge and vaginal pain.    Musculoskeletal: Negative for arthralgias, back pain, joint swelling and myalgias. Skin: Negative for color change and rash. Allergic/Immunologic: Negative for environmental allergies, food allergies and immunocompromised state. Neurological: Negative for dizziness, seizures, syncope, speech difficulty, weakness, numbness and headaches. Hematological: Negative for adenopathy. Does not bruise/bleed easily. Psychiatric/Behavioral: Negative for agitation, behavioral problems, confusion, decreased concentration, dysphoric mood and suicidal ideas. The patient is not nervous/anxious and is not hyperactive. Physical Exam  Vitals and nursing note reviewed. Abdominal:             Vitals:    07/08/21 1001   BP: 98/76   Pulse: 88   Weight: 169 lb (76.7 kg)         ASSESSMENT/PLAN:     Diagnosis Orders   1. Dysuria  Culture, Urine    Urinalysis   2. Postoperative examination         PLAN: We will check urine culture and sensitivity  Patient to avoid intercourse and she is to follow-up here in 5 weeks for vaginal cuff check      No follow-ups on file. On this date 7/8/2021 I have spent 20 minutes reviewing previous notes, test results and face to face with the patient discussing the diagnosis and importance of compliance with the treatment plan as well as documenting on the day of the visit. An electronic signature was used to authenticate this note. Please note this report has been partially produced using speech recognition software and may cause contain errors related to that system including grammar, punctuation and spelling as well as words and phrases that may seem inappropriate. If there are questions or concerns please feel free to contact me to clarify.

## 2021-07-10 LAB — URINE CULTURE, ROUTINE: NORMAL

## 2021-07-21 PROBLEM — Z01.818 PRE-OP EXAM: Status: RESOLVED | Noted: 2021-06-21 | Resolved: 2021-07-21

## 2021-07-23 ENCOUNTER — OFFICE VISIT (OUTPATIENT)
Dept: OBGYN CLINIC | Age: 51
End: 2021-07-23
Payer: COMMERCIAL

## 2021-07-23 VITALS
HEART RATE: 79 BPM | WEIGHT: 167 LBS | DIASTOLIC BLOOD PRESSURE: 70 MMHG | SYSTOLIC BLOOD PRESSURE: 104 MMHG | BODY MASS INDEX: 29.58 KG/M2

## 2021-07-23 DIAGNOSIS — R10.2 PELVIC PRESSURE IN FEMALE: ICD-10-CM

## 2021-07-23 DIAGNOSIS — R33.9 INCOMPLETE EMPTYING OF BLADDER: Primary | ICD-10-CM

## 2021-07-23 DIAGNOSIS — R33.9 INCOMPLETE EMPTYING OF BLADDER: ICD-10-CM

## 2021-07-23 LAB
BACTERIA: NEGATIVE /HPF
BILIRUBIN URINE: NEGATIVE
BLOOD, URINE: ABNORMAL
CLARITY: CLEAR
COLOR: YELLOW
EPITHELIAL CELLS, UA: ABNORMAL /HPF (ref 0–5)
GLUCOSE URINE: NEGATIVE MG/DL
HYALINE CASTS: ABNORMAL /HPF (ref 0–5)
KETONES, URINE: NEGATIVE MG/DL
LEUKOCYTE ESTERASE, URINE: ABNORMAL
NITRITE, URINE: NEGATIVE
PH UA: 5 (ref 5–9)
PROTEIN UA: NEGATIVE MG/DL
RBC UA: ABNORMAL /HPF (ref 0–5)
RENAL EPITHELIAL, UA: ABNORMAL /HPF
SPECIFIC GRAVITY UA: 1.02 (ref 1–1.03)
UROBILINOGEN, URINE: 0.2 E.U./DL
WBC UA: ABNORMAL /HPF (ref 0–5)

## 2021-07-23 PROCEDURE — 51798 US URINE CAPACITY MEASURE: CPT | Performed by: OBSTETRICS & GYNECOLOGY

## 2021-07-23 PROCEDURE — 99024 POSTOP FOLLOW-UP VISIT: CPT | Performed by: OBSTETRICS & GYNECOLOGY

## 2021-07-23 RX ORDER — TACROLIMUS 1 MG/G
OINTMENT TOPICAL
COMMUNITY
Start: 2021-07-22

## 2021-07-23 RX ORDER — ALPRAZOLAM 1 MG/1
TABLET ORAL
COMMUNITY

## 2021-07-23 RX ORDER — DEXTROAMPHETAMINE SACCHARATE, AMPHETAMINE ASPARTATE MONOHYDRATE, DEXTROAMPHETAMINE SULFATE AND AMPHETAMINE SULFATE 6.25; 6.25; 6.25; 6.25 MG/1; MG/1; MG/1; MG/1
CAPSULE, EXTENDED RELEASE ORAL
COMMUNITY

## 2021-07-23 ASSESSMENT — ENCOUNTER SYMPTOMS
TROUBLE SWALLOWING: 0
ABDOMINAL PAIN: 0
COLOR CHANGE: 0
COUGH: 0
SORE THROAT: 0
BACK PAIN: 0
WHEEZING: 0
BLOOD IN STOOL: 0
CHEST TIGHTNESS: 0
VOMITING: 0
SHORTNESS OF BREATH: 0
VOICE CHANGE: 0
CONSTIPATION: 0
NAUSEA: 0
ABDOMINAL DISTENTION: 0

## 2021-07-23 NOTE — PROGRESS NOTES
HPI:  Ta Dan (: 1970) is a 54 y.o. female, Established patient, here for evaluation of the following chief complaint(s):  Post-Op Check (cuff check. c/o incomplete emptying)  Andrea Lloyd is 4 weeks post LAVH bilateral salpingectomy and a transvaginal tape. She went to the emergency room with a suspected infraumbilical wound incision infection was placed on an antibiotic. Her recent urine culture was negative she has a sensation of urgency and that she does not feel like she empties completely. SUBJECTIVE/OBJECTIVE:    Past Surgical History:   Procedure Laterality Date    BLADDER SUSPENSION N/A 2021    TVTO CYSTOSCOPY performed by Amarilys Regan DO at [address] [facility] BREAST LUMPECTOMY Left 2014    DILATION AND CURETTAGE OF UTERUS  2016    DR Sheldon Shannon    HYSTERECTOMY, VAGINAL N/A 2021    LAVH BS performed by Amarilys Regan DO at [address] ENDOSCOPY  08    DR Kailey Finch    UPPER GASTROINTESTINAL ENDOSCOPY  8/27/10    DR CUMMINS        Review of Systems   Constitutional: Negative for activity change, appetite change, fatigue and unexpected weight change. HENT: Negative for dental problem, ear pain, hearing loss, nosebleeds, sore throat, trouble swallowing and voice change. Eyes: Negative for visual disturbance. Respiratory: Negative for cough, chest tightness, shortness of breath and wheezing. Cardiovascular: Negative for chest pain and palpitations. Gastrointestinal: Negative for abdominal distention, abdominal pain, blood in stool, constipation, nausea and vomiting. Endocrine: Negative for cold intolerance, heat intolerance, polydipsia, polyphagia and polyuria. Genitourinary: Positive for urgency.  Negative for difficulty urinating, dyspareunia, dysuria, flank pain, frequency, genital sores, hematuria, menstrual problem, pelvic pain, vaginal bleeding, vaginal discharge and vaginal pain.   Musculoskeletal: Negative for arthralgias, back pain, joint swelling and myalgias. Skin: Negative for color change and rash. Allergic/Immunologic: Negative for environmental allergies, food allergies and immunocompromised state. Neurological: Negative for dizziness, seizures, syncope, speech difficulty, weakness, numbness and headaches. Hematological: Negative for adenopathy. Does not bruise/bleed easily. Psychiatric/Behavioral: Negative for agitation, behavioral problems, confusion, decreased concentration, dysphoric mood and suicidal ideas. The patient is not nervous/anxious and is not hyperactive. Physical Exam  Constitutional:       Appearance: She is well-developed. HENT:      Head: Normocephalic and atraumatic. Eyes:      Pupils: Pupils are equal, round, and reactive to light. Neck:      Thyroid: No thyromegaly. Trachea: No tracheal deviation. Cardiovascular:      Rate and Rhythm: Normal rate and regular rhythm. Heart sounds: Normal heart sounds. Pulmonary:      Effort: Pulmonary effort is normal.      Breath sounds: Normal breath sounds. Chest:      Chest wall: No tenderness. Abdominal:      General: Bowel sounds are normal. There is no distension. Palpations: Abdomen is soft. There is no mass. Tenderness: There is no abdominal tenderness. There is no guarding or rebound. Hernia: There is no hernia in the left inguinal area. Genitourinary:     Labia:         Right: No rash, tenderness, lesion or injury. Left: No rash, tenderness, lesion or injury. Vagina: Normal. No foreign body. No vaginal discharge, erythema, tenderness or bleeding. Adnexa:         Right: No mass, tenderness or fullness. Left: No mass, tenderness or fullness. Rectum: Normal. No mass, tenderness, anal fissure, external hemorrhoid or internal hemorrhoid. Normal anal tone. Musculoskeletal:         General: Normal range of motion. Cervical back: Normal range of motion. Lymphadenopathy:      Cervical: No cervical adenopathy. Neurological:      Mental Status: She is alert and oriented to person, place, and time. Vitals:    07/23/21 1039   BP: 104/70   Pulse: 79   Weight: 167 lb (75.8 kg)         ASSESSMENT/PLAN:     Diagnosis Orders   1. Incomplete emptying of bladder  [place] MEASUREMENT,POST-VOID RESIDUAL VOLUME BY US,NON-IMAGING   2. Pelvic pressure in female  [place] MEASUREMENT,POST-VOID RESIDUAL VOLUME BY US,NON-IMAGING   Urgency    PLAN: Complete the course of antibiotics for the wound infection Myrbetriq 25 mg daily for a month patient to follow-up at that time for reevaluation of the urgency as well as the vaginal cuff check. We will send the urine for culture       No follow-ups on file. On this date 7/23/2021 I have spent 30 minutes reviewing previous notes, test results and face to face with the patient discussing the diagnosis and importance of compliance with the treatment plan as well as documenting on the day of the visit. An electronic signature was used to authenticate this note. Please note this report has been partially produced using speech recognition software and may cause contain errors related to that system including grammar, punctuation and spelling as well as words and phrases that may seem inappropriate. If there are questions or concerns please feel free to contact me to clarify.

## 2021-07-25 LAB — URINE CULTURE, ROUTINE: NORMAL

## 2021-08-12 ENCOUNTER — OFFICE VISIT (OUTPATIENT)
Dept: OBGYN CLINIC | Age: 51
End: 2021-08-12

## 2021-08-12 VITALS — BODY MASS INDEX: 30.47 KG/M2 | DIASTOLIC BLOOD PRESSURE: 78 MMHG | WEIGHT: 172 LBS | SYSTOLIC BLOOD PRESSURE: 108 MMHG

## 2021-08-12 DIAGNOSIS — Z09 POSTOPERATIVE EXAMINATION: Primary | ICD-10-CM

## 2021-08-12 PROCEDURE — 99024 POSTOP FOLLOW-UP VISIT: CPT | Performed by: OBSTETRICS & GYNECOLOGY

## 2021-08-12 ASSESSMENT — ENCOUNTER SYMPTOMS
TROUBLE SWALLOWING: 0
WHEEZING: 0
SORE THROAT: 0
BLOOD IN STOOL: 0
ABDOMINAL PAIN: 0
COUGH: 0
VOMITING: 0
ABDOMINAL DISTENTION: 0
COLOR CHANGE: 0
CHEST TIGHTNESS: 0
CONSTIPATION: 0
NAUSEA: 0
VOICE CHANGE: 0
SHORTNESS OF BREATH: 0
BACK PAIN: 0

## 2021-08-12 NOTE — PROGRESS NOTES
HPI:  Adarsh Forrest (: 1970) is a 54 y.o. female, Established patient, here for evaluation of the following chief complaint(s):  Post-Op Check (patient still has incomplete emptying sensation. ER twice, UTI and Incision infected. on 7 antibiotics)  Patient has been to the emergency room couple of times she was placed on Bactrim and Keflex for both UTI and a incision infection. She took the [facility] for a month and noticed no change in her symptoms. She states that she is having some bleeding      SUBJECTIVE/OBJECTIVE:    Past Surgical History:   Procedure Laterality Date    BLADDER SUSPENSION N/A 2021    TVTO CYSTOSCOPY performed by Conner Cash DO at [address] [facility] BREAST LUMPECTOMY Left 2014    DILATION AND CURETTAGE OF UTERUS  2016    DR Mccullough Topher    HYSTERECTOMY, VAGINAL N/A 2021    LAVH BS performed by Conner Cash DO at [address] [facility] ENDOSCOPY  08    DR Shraddha Juarez    UPPER GASTROINTESTINAL ENDOSCOPY  8/27/10    DR CUMMINS        Review of Systems   Constitutional: Negative for activity change, appetite change, fatigue and unexpected weight change. HENT: Negative for dental problem, ear pain, hearing loss, nosebleeds, sore throat, trouble swallowing and voice change. Eyes: Negative for visual disturbance. Respiratory: Negative for cough, chest tightness, shortness of breath and wheezing. Cardiovascular: Negative for chest pain and palpitations. Gastrointestinal: Negative for abdominal distention, abdominal pain, blood in stool, constipation, nausea and vomiting. Endocrine: Negative for cold intolerance, heat intolerance, polydipsia, polyphagia and polyuria. Genitourinary: Negative for difficulty urinating, dyspareunia, dysuria, flank pain, frequency, genital sores, hematuria, menstrual problem, pelvic pain, urgency, vaginal bleeding, vaginal discharge and vaginal pain. Musculoskeletal: Negative for arthralgias, back pain, joint swelling and myalgias. Skin: Negative for color change and rash. Allergic/Immunologic: Negative for environmental allergies, food allergies and immunocompromised state. Neurological: Negative for dizziness, seizures, syncope, speech difficulty, weakness, numbness and headaches. Hematological: Negative for adenopathy. Does not bruise/bleed easily. Psychiatric/Behavioral: Negative for agitation, behavioral problems, confusion, decreased concentration, dysphoric mood and suicidal ideas. The patient is not nervous/anxious and is not hyperactive. Physical Exam  Vitals and nursing note reviewed. Exam conducted with a chaperone present. Genitourinary:                  Vitals:    08/12/21 1334   BP: 108/78   Weight: 172 lb (78 kg)         ASSESSMENT/PLAN:    Postop exam following LAVH bilateral salpingectomy and a TVT o  PLAN: Follow-up in 2 weeks. Patient to refrain from intercourse until that time      No follow-ups on file. On this date 8/12/2021 I have spent 20 minutes reviewing previous notes, test results and face to face with the patient discussing the diagnosis and importance of compliance with the treatment plan as well as documenting on the day of the visit. An electronic signature was used to authenticate this note. Please note this report has been partially produced using speech recognition software and may cause contain errors related to that system including grammar, punctuation and spelling as well as words and phrases that may seem inappropriate. If there are questions or concerns please feel free to contact me to clarify.

## 2022-02-26 DIAGNOSIS — F41.9 ANXIETY: ICD-10-CM

## 2022-02-26 RX ORDER — BUPROPION HYDROCHLORIDE 300 MG/1
TABLET ORAL
Qty: 90 TABLET | Refills: 3 | OUTPATIENT
Start: 2022-02-26

## 2022-03-28 ENCOUNTER — OFFICE VISIT (OUTPATIENT)
Dept: OBGYN CLINIC | Age: 52
End: 2022-03-28
Payer: COMMERCIAL

## 2022-03-28 VITALS
DIASTOLIC BLOOD PRESSURE: 70 MMHG | WEIGHT: 179 LBS | HEIGHT: 63 IN | BODY MASS INDEX: 31.71 KG/M2 | SYSTOLIC BLOOD PRESSURE: 110 MMHG

## 2022-03-28 DIAGNOSIS — N93.0 PCB (POST COITAL BLEEDING): ICD-10-CM

## 2022-03-28 DIAGNOSIS — Z12.31 SCREENING MAMMOGRAM FOR BREAST CANCER: ICD-10-CM

## 2022-03-28 DIAGNOSIS — Z01.419 ENCOUNTER FOR WELL WOMAN EXAM WITH ROUTINE GYNECOLOGICAL EXAM: Primary | ICD-10-CM

## 2022-03-28 DIAGNOSIS — R39.198 DIFFICULTY URINATING: ICD-10-CM

## 2022-03-28 PROCEDURE — 99396 PREV VISIT EST AGE 40-64: CPT | Performed by: OBSTETRICS & GYNECOLOGY

## 2022-03-28 PROCEDURE — 99212 OFFICE O/P EST SF 10 MIN: CPT | Performed by: OBSTETRICS & GYNECOLOGY

## 2022-03-28 ASSESSMENT — ENCOUNTER SYMPTOMS
EYES NEGATIVE: 1
CONSTIPATION: 0
VOMITING: 0
RECTAL PAIN: 0
DIARRHEA: 0
RESPIRATORY NEGATIVE: 1
ABDOMINAL DISTENTION: 0
ALLERGIC/IMMUNOLOGIC NEGATIVE: 1
BLOOD IN STOOL: 0
ANAL BLEEDING: 0
NAUSEA: 0
ABDOMINAL PAIN: 0

## 2022-03-28 NOTE — PROGRESS NOTES
Subjective:      Patient ID: Ahmet Lester is a 50 y.o. female    Annual exam. Reviewed medical, surgical, social and family history. Also reviewed current medications and allergies. S/P LAVH. Pap deferred ( cervical pathology from LAV negative 6/21 )  and screening mammogram ordered. Monthly SBE encouraged. Screening colonoscopy recommended per routine. F/U annual exam or prn. Pt also wished to discuss PC spotting x 3-4 times over last 3 months and difficulty with urination  extending her appointment time by 15 minutes. Patient had LAVH 6/21 with Dr Mora Hurt. No issues with spotting until last 3 months. SSE with linear area of granulation tissue at cuff. Friable to touch and may be causing PC spotting. Offered cauterization and accepted. Area cauterized with silver nitrate in routine fashion. Patient tolerated well. Also discussed sx of feeling like she has to urinate, but will go to use RR and unable to go. Is able to urinate most times. PVR per Dr Mora Hurt 8/21 50 cc with 25 cc voided. Provided Myrbetriq, but only took for a few weeks and stopped. Gave options of conservative observation vs repeat PVR and restarting meds vs consult with Dr Jen Ortiz. Patient states she will monitor for now. All questions answered. Vitals:  /70   Ht 5' 3\" (1.6 m)   Wt 179 lb (81.2 kg)   LMP 06/28/2021 (Exact Date)   BMI 31.71 kg/m²   Past Medical History:   Diagnosis Date    ADD (attention deficit disorder)     Allergic rhinitis     Anxiety     Depression     GERD (gastroesophageal reflux disease)     Herpes simplex virus type 1 (HSV-1) dermatitis     oral only.     Migraines     PONV (postoperative nausea and vomiting)      Past Surgical History:   Procedure Laterality Date    BLADDER SUSPENSION N/A 6/28/2021    TVTO CYSTOSCOPY performed by Delmar Gonzalez DO at [address] [facility] BREAST LUMPECTOMY Left 12/2014    DILATION [facility]TER[facility]21/2016  [facility]    HYSTERECTOMY      [facility]    HYSTERECTOMY, VAGINAL N/A 6/28/2021    [facility] BS performed by Sulaiman Pace DO at [address] ENDOSCOPY  9/9/08    DR Marzette Landau    UPPER GASTROINTESTINAL ENDOSCOPY  8/27/10    DR Marzette Landau     Allergies:  Doxycycline and Prochlorperazine edisylate  Current Outpatient Medications   Medication Sig Dispense Refill    amphetamine-dextroamphetamine (ADDERALL XR) 25 MG extended release capsule Adderall XR 25 mg capsule,extended release      tacrolimus (PROTOPIC) 0.1 % ointment       ALPRAZolam (XANAX) 1 MG tablet alprazolam 1 mg tablet      buPROPion (WELLBUTRIN XL) 300 MG extended release tablet TAKE 1 TABLET BY MOUTH EVERY DAY IN THE MORNING 90 tablet 3    dexlansoprazole (DEXILANT) 60 MG CPDR delayed release capsule Take 1 capsule by mouth daily 90 capsule 3    valACYclovir (VALTREX) 500 MG tablet TAKE 2 TABLETS BY MOUTH TWICE DAILY AS NEEDED FOR COLD SORES 21 tablet 3    ADDERALL XR 30 MG capsule TK ONE C PO QD IN THE MORNING UP ON AWAKENING      traZODone (DESYREL) 50 MG tablet nightly   3    topiramate (TOPAMAX) 50 MG tablet 100 mg 2 times daily   3    fluticasone (FLONASE) 50 MCG/ACT nasal spray 1 spray by Nasal route daily 1 Bottle 5     No current facility-administered medications for this visit. Social History     Socioeconomic History    Marital status:      Spouse name: Not on file    Number of children: Not on file    Years of education: Not on file    Highest education level: Not on file   Occupational History    Not on file   Tobacco Use    Smoking status: Former Smoker    Smokeless tobacco: Never Used   Substance and Sexual Activity    Alcohol use:  Yes     Alcohol/week: 0.0 standard drinks     Comment: occ    Drug use: No    Sexual activity: Yes     Partners: Male   Other Topics Concern    Not on file   Social History Narrative    Not on file     Social Determinants of for activity change, appetite change, chills, diaphoresis, fatigue, fever and unexpected weight change. HENT: Negative. Eyes: Negative. Respiratory: Negative. Cardiovascular: Negative. Gastrointestinal: Negative for abdominal distention, abdominal pain, anal bleeding, blood in stool, constipation, diarrhea, nausea, rectal pain and vomiting. Endocrine: Negative. Genitourinary: Positive for difficulty urinating and vaginal bleeding. Negative for decreased urine volume, dyspareunia, dysuria, enuresis, flank pain, frequency, genital sores, hematuria, menstrual problem, pelvic pain, urgency, vaginal discharge and vaginal pain. Musculoskeletal: Negative. Skin: Negative. Allergic/Immunologic: Negative. Neurological: Negative. Hematological: Negative. Psychiatric/Behavioral: Negative. Objective:     Physical Exam  Constitutional:       Appearance: She is well-developed. HENT:      Head: Normocephalic and atraumatic. Eyes:      Conjunctiva/sclera: Conjunctivae normal.   Neck:      Thyroid: No thyromegaly. Cardiovascular:      Rate and Rhythm: Normal rate and regular rhythm. Pulmonary:      Effort: Pulmonary effort is normal.      Breath sounds: Normal breath sounds. Chest:   Breasts:      Right: Normal. No swelling, bleeding, inverted nipple, mass, nipple discharge, skin change, tenderness, axillary adenopathy or supraclavicular adenopathy. Left: Normal. No swelling, bleeding, inverted nipple, mass, nipple discharge, skin change, tenderness, axillary adenopathy or supraclavicular adenopathy. Abdominal:      General: Bowel sounds are normal.      Palpations: Abdomen is soft. There is no mass. Tenderness: There is no abdominal tenderness. There is no guarding or rebound. Hernia: No hernia is present. There is no hernia in the left inguinal area. Genitourinary:     Labia:         Right: No rash, tenderness, lesion or injury.          Left: No rash, tenderness, lesion or injury. Vagina: Normal. No vaginal discharge. Adnexa:         Right: No mass, tenderness or fullness. Left: No mass, tenderness or fullness. Rectum: Normal.   Musculoskeletal:         General: Normal range of motion. Cervical back: Normal range of motion and neck supple. Lymphadenopathy:      Upper Body:      Right upper body: No supraclavicular or axillary adenopathy. Left upper body: No supraclavicular or axillary adenopathy. Lower Body: No right inguinal adenopathy. No left inguinal adenopathy. Skin:     General: Skin is warm and dry. Coloration: Skin is not pale. Neurological:      Mental Status: She is alert and oriented to person, place, and time. Psychiatric:         Behavior: Behavior normal.         Assessment:       Diagnosis Orders   1. Encounter for well woman exam with routine gynecological exam     2. Screening mammogram for breast cancer  SOCO DIGITAL SCREEN W OR WO CAD BILATERAL   3. PCB (post coital bleeding)     4. Difficulty urinating          Plan:      Medications placedthis encounter:  No orders of the defined types were placed in this encounter. Orders placedthis encounter:  Orders Placed This Encounter   Procedures    SOCO DIGITAL SCREEN W OR WO CAD BILATERAL     Standing Status:   Future     Standing Expiration Date:   3/28/2023         Follow up:  Return in about 1 year (around 3/28/2023) for Annual.   Repeat Annual GYN exam every 1 year. Cervical Cytology exam starts age 24. If Negative Cytology;  follow-up screening per current guidelines. Mammograms yearly starting at age 36. Calcium and Vitamin D dosing reviewed ( age appropriate ). Colonoscopy and bone density screening discussed ( age appropriate ). Birth control and STD prevention discussed ( age appropriate ). Gardisil counseling completed for all patients ( age appropriate ). Routine health maintenance ( per PCP and guidelines ). The patient was asked if she would like a chaperone present for her intimate exam. She  Declined the chaperone.  Liza Cochran MD

## 2022-04-28 RX ORDER — DEXLANSOPRAZOLE 60 MG/1
CAPSULE, DELAYED RELEASE ORAL
Qty: 90 CAPSULE | Refills: 3 | OUTPATIENT
Start: 2022-04-28

## 2022-08-08 ENCOUNTER — HOSPITAL ENCOUNTER (OUTPATIENT)
Dept: WOMENS IMAGING | Age: 52
Discharge: HOME | End: 2022-08-10
Payer: COMMERCIAL

## 2022-08-08 VITALS — BODY MASS INDEX: 31.71 KG/M2 | HEIGHT: 63 IN

## 2022-08-08 DIAGNOSIS — Z12.31 SCREENING MAMMOGRAM FOR BREAST CANCER: ICD-10-CM

## 2022-08-08 PROCEDURE — 77063 BREAST TOMOSYNTHESIS BI: CPT

## 2023-04-05 ENCOUNTER — TELEPHONE (OUTPATIENT)
Dept: OBGYN CLINIC | Age: 53
End: 2023-04-05

## 2023-09-14 ENCOUNTER — HOSPITAL ENCOUNTER (OUTPATIENT)
Dept: WOMENS IMAGING | Age: 53
Discharge: HOME | End: 2023-09-16
Payer: COMMERCIAL

## 2023-09-14 VITALS — HEIGHT: 63 IN | BODY MASS INDEX: 31.71 KG/M2

## 2023-09-14 DIAGNOSIS — Z12.31 SCREENING MAMMOGRAM FOR BREAST CANCER: ICD-10-CM

## 2023-09-14 PROCEDURE — 77063 BREAST TOMOSYNTHESIS BI: CPT

## 2023-09-22 ENCOUNTER — HOSPITAL ENCOUNTER (OUTPATIENT)
Dept: DATA CONVERSION | Facility: HOSPITAL | Age: 53
End: 2023-09-22
Attending: ORTHOPAEDIC SURGERY | Admitting: ORTHOPAEDIC SURGERY
Payer: COMMERCIAL

## 2023-09-22 DIAGNOSIS — G56.02 CARPAL TUNNEL SYNDROME, LEFT UPPER LIMB: ICD-10-CM

## 2023-09-22 DIAGNOSIS — M67.432 GANGLION, LEFT WRIST: ICD-10-CM

## 2023-09-22 DIAGNOSIS — R22.32 LOCALIZED SWELLING, MASS AND LUMP, LEFT UPPER LIMB: ICD-10-CM

## 2023-09-22 DIAGNOSIS — Z87.891 PERSONAL HISTORY OF NICOTINE DEPENDENCE: ICD-10-CM

## 2023-09-28 LAB
COMPLETE PATHOLOGY REPORT: NORMAL
CONVERTED CLINICAL DIAGNOSIS-HISTORY: NORMAL
CONVERTED FINAL DIAGNOSIS: NORMAL
CONVERTED FINAL REPORT PDF LINK TO COPY AND PASTE: NORMAL
CONVERTED GROSS DESCRIPTION: NORMAL
CONVERTED MICROSCOPIC DESCRIPTION: NORMAL

## 2023-09-29 VITALS — WEIGHT: 169.75 LBS | BODY MASS INDEX: 30.08 KG/M2 | HEIGHT: 63 IN

## 2023-09-29 PROBLEM — K21.9 GASTROESOPHAGEAL REFLUX DISEASE WITHOUT ESOPHAGITIS: Status: ACTIVE | Noted: 2023-09-29

## 2023-09-29 PROBLEM — E66.9 OBESITY (BMI 30-39.9): Status: ACTIVE | Noted: 2023-09-29

## 2023-09-29 PROBLEM — R07.9 CHEST PAIN: Status: ACTIVE | Noted: 2023-09-29

## 2023-09-29 PROBLEM — G47.33 MILD OBSTRUCTIVE SLEEP APNEA: Status: ACTIVE | Noted: 2023-09-29

## 2023-09-29 PROBLEM — M47.24 THORACIC RADICULOPATHY DUE TO DEGENERATIVE JOINT DISEASE OF SPINE: Status: ACTIVE | Noted: 2023-09-29

## 2023-09-29 PROBLEM — F33.1 MODERATE EPISODE OF RECURRENT MAJOR DEPRESSIVE DISORDER (MULTI): Status: ACTIVE | Noted: 2023-09-29

## 2023-09-29 PROBLEM — R63.5 WEIGHT GAIN: Status: ACTIVE | Noted: 2023-09-29

## 2023-09-29 PROBLEM — M54.16 LUMBAR RADICULOPATHY, ACUTE: Status: ACTIVE | Noted: 2023-09-29

## 2023-09-29 PROBLEM — M41.9 SCOLIOSIS: Status: ACTIVE | Noted: 2023-09-29

## 2023-09-29 PROBLEM — F41.1 GENERALIZED ANXIETY DISORDER: Status: ACTIVE | Noted: 2023-09-29

## 2023-09-29 PROBLEM — G56.00 CARPAL TUNNEL SYNDROME: Status: ACTIVE | Noted: 2023-09-29

## 2023-09-29 PROBLEM — M47.816 DJD (DEGENERATIVE JOINT DISEASE), LUMBAR: Status: ACTIVE | Noted: 2023-09-29

## 2023-09-29 PROBLEM — M26.9: Status: ACTIVE | Noted: 2023-09-29

## 2023-09-29 PROBLEM — G47.30 SLEEP DISORDER BREATHING: Status: ACTIVE | Noted: 2023-09-29

## 2023-09-29 PROBLEM — F98.8 ADD (ATTENTION DEFICIT DISORDER): Status: ACTIVE | Noted: 2023-09-29

## 2023-09-29 PROBLEM — R10.13 INTERMITTENT EPIGASTRIC ABDOMINAL PAIN: Status: ACTIVE | Noted: 2023-09-29

## 2023-09-29 PROBLEM — R00.2 PALPITATIONS: Status: ACTIVE | Noted: 2023-09-29

## 2023-09-29 RX ORDER — TOPIRAMATE 100 MG/1
100 TABLET, FILM COATED ORAL 2 TIMES DAILY
COMMUNITY
Start: 2023-09-07 | End: 2024-05-16 | Stop reason: WASHOUT

## 2023-09-29 RX ORDER — DEXTROAMPHETAMINE SACCHARATE, AMPHETAMINE ASPARTATE MONOHYDRATE, DEXTROAMPHETAMINE SULFATE AND AMPHETAMINE SULFATE 6.25; 6.25; 6.25; 6.25 MG/1; MG/1; MG/1; MG/1
25 CAPSULE, EXTENDED RELEASE ORAL EVERY MORNING
COMMUNITY

## 2023-09-29 RX ORDER — UBROGEPANT 100 MG/1
100 TABLET ORAL
COMMUNITY
Start: 2023-09-07

## 2023-09-29 RX ORDER — OXYCODONE AND ACETAMINOPHEN 5; 325 MG/1; MG/1
1 TABLET ORAL EVERY 6 HOURS PRN
COMMUNITY
Start: 2023-09-21 | End: 2024-05-16 | Stop reason: WASHOUT

## 2023-09-29 RX ORDER — TOPIRAMATE 100 MG/1
1 TABLET, FILM COATED ORAL NIGHTLY
COMMUNITY
End: 2024-05-16 | Stop reason: WASHOUT

## 2023-09-29 RX ORDER — MELOXICAM 10 MG/1
1 CAPSULE ORAL EVERY 12 HOURS PRN
COMMUNITY
Start: 2023-07-12 | End: 2024-05-16 | Stop reason: ALTCHOICE

## 2023-09-29 RX ORDER — ALPRAZOLAM 1 MG/1
1 TABLET ORAL DAILY PRN
COMMUNITY
End: 2024-05-16 | Stop reason: SDUPTHER

## 2023-09-29 RX ORDER — IBUPROFEN 200 MG
200 TABLET ORAL EVERY 6 HOURS PRN
COMMUNITY

## 2023-09-29 RX ORDER — BUPROPION HYDROCHLORIDE 300 MG/1
300 TABLET ORAL DAILY
COMMUNITY
End: 2024-05-16 | Stop reason: DRUGHIGH

## 2023-09-29 RX ORDER — PANTOPRAZOLE SODIUM 40 MG/1
40 TABLET, DELAYED RELEASE ORAL DAILY
COMMUNITY

## 2023-09-29 RX ORDER — LISDEXAMFETAMINE DIMESYLATE 60 MG/1
60 CAPSULE ORAL EVERY MORNING
COMMUNITY
End: 2024-05-16 | Stop reason: WASHOUT

## 2023-09-29 RX ORDER — DUPILUMAB 300 MG/2ML
INJECTION, SOLUTION SUBCUTANEOUS
COMMUNITY
Start: 2023-07-15

## 2023-09-29 RX ORDER — LISDEXAMFETAMINE DIMESYLATE 30 MG/1
2 CAPSULE ORAL EVERY MORNING
COMMUNITY
Start: 2023-08-31 | End: 2024-05-16 | Stop reason: WASHOUT

## 2023-09-29 RX ORDER — TRAZODONE HYDROCHLORIDE 50 MG/1
50 TABLET ORAL NIGHTLY
COMMUNITY
End: 2024-05-16 | Stop reason: WASHOUT

## 2023-09-30 NOTE — H&P
History & Physical Reviewed:   Pregnant/Lactating:  ·  Are You Pregnant no   ·  Are You Currently Breastfeeding no     I have reviewed the History and Physical dated:  29-Aug-2023   History and Physical reviewed and relevant findings noted. Patient examined to review pertinent physical  findings.: No significant changes   Home Medications Reviewed: no changes noted   Allergies Reviewed: no changes noted     Consent:   COVID-19 Consent:  ·  COVID-19 Risk Consent Surgeon has reviewed key risks related to the risk of xiang COVID-19 and if they contract COVID-19 what the risks are.       Electronic Signatures:  Kavin Page ()  (Signed 22-Sep-2023 09:29)   Authored: History & Physical Reviewed, Consent, Note  Completion      Last Updated: 22-Sep-2023 09:29 by Kavin Page ()

## 2023-10-01 NOTE — OP NOTE
Post Operative Note:     Post-Procedure Diagnosis: Left carpal tunnel syndrome.   Left wrist ganglion cyst   Procedure: 1.   2.   3.   4.   5.   Surgeon: Kavin Page D.O.   Resident/Fellow/Other Assistant: JOSE Blanchard   Estimated Blood Loss (mL): Less than 20 cc   Specimen: yes   Findings: Left carpal tunnel syndrome.  Left wrist  ganglion cyst     Operative Report Dictated:  Dictation: not applicable - note contains Operative  Report   Operative Report:    Preoperative diagnosis:Left carpal tunnel syndrome.  Left wrist ganglion cyst    Postoperative diagnosis:Kavin Page D.O.    Procedure planned:Left carpal tunnel release.  Excision left wrist ganglion cyst.    Procedure performed:Same    Surgeon:Kavin Page D.O.    Assistant:JOSE Blanchard  The physician assistant was present to the entire case. Given the nature of the disease process and the procedure to be performed a skilled surgical assistant was necessary during the case. The assistant was necessary in order to hold retractors and directly  assist in the operation. A certified scrub tech was at the back table managing instruments and supplies for the surgical case.    Anesthesia:General    Estimated blood loss:Less than 10 cc    Drains:None    Tourniquet:Less than 30 minutes at 250 mmHg to the well-padded left upper arm    Specimens:Excised portions of the capsule were sent for pathology    Implants:None    Indications for procedure:The patient presented to the office with complaints of left distal third volar radial forearm/wrist mass along with symptoms compatible with left carpal tunnel syndrome.  MRI showed evidence for ganglion cyst about the volar  radial wrist.  Treatment options were discussed.  The patient elected to proceed forth with surgery by way of extensile carpal tunnel release with concomitant excision mass.  In the preoperative area the patient described a recent regression of the size  of the mass.  We talked  about options and she ultimately elected to proceed forth with the original surgery as planned.  Informed consent was signed and placed in the chart.    Complications: None noted at the time of surgery    Description of procedure:The patient was taken to the operative suite and placed in the supine position on the operating table.  A timeout was performed and the left wrist confirmed to be the operative site.  The patient was carefully positioned on  the table in such a fashion as to pad all bony prominences and peripheral nerves.  She was administered appropriate IV antibiotics and general anesthesia.  She was prepped and draped in the normal sterile fashion.  The planned surgical incision was then  marked out extending longitudinally about the volar radial distal third forearm extending obliquely across the wrist flexion crease and proceeding longitudinally in the volar midline palm directly overlying the transverse carpal ligament.  The 15 blade  was used to incise skin.  Dissection was carried through the subcutaneous plane in the palm splitting the longitudinal fibers of the palmar aponeurosis exposing the transverse fibers of the transverse carpal ligament.  These transverse fibers were then  meticulously released working from distal to proximal releasing the ligament in its entirety under direct visualization with the 15 blade.  The contents of the carpal tunnel were then probed.  There was no evidence for mass within the carpal tunnel itself  as may have been expected from the MRI findings.  There was noted to be obvious hypertrophic change and evidence for involuted mucous cyst about the volar radial wrist capsule overlying the radiocarpal joint and STT joint.  Portions of the capsule were  excised and the Bovie cautery device was used to cauterize the assumed takeoff point of the involuted ganglion cyst.  Dissection was then carried about the radial wrist looking for any evidence of cystic change about the  FCR tendon or radial artery.   No discrete mass was identified.  The portions of the excised capsule were sent for pathology.  Irrigation was performed.  A tourniquet was relieved.  Hemostasis was confirmed.  Wounds were closed in a layered fashion.  Bulky soft dressing and volar plaster  splint were placed.  The patient was allowed to arise from anesthesia and taken recovery stable condition.  Overall the patient tolerated the procedure well.    Disposition: Stable to PACU      Electronic Signatures:  Kavin Page ()  (Signed 22-Sep-2023 11:16)   Authored: Post Operative Note, Note Completion      Last Updated: 22-Sep-2023 11:16 by Kavin Page ()

## 2023-10-02 LAB
ATRIAL RATE: 67 BPM
P AXIS: 27 DEGREES
P OFFSET: 179 MS
P ONSET: 128 MS
PR INTERVAL: 182 MS
Q ONSET: 219 MS
QRS COUNT: 11 BEATS
QRS DURATION: 88 MS
QT INTERVAL: 402 MS
QTC CALCULATION(BAZETT): 424 MS
QTC FREDERICIA: 417 MS
R AXIS: 18 DEGREES
T AXIS: 46 DEGREES
T OFFSET: 420 MS
VENTRICULAR RATE: 67 BPM

## 2023-10-05 ENCOUNTER — OFFICE VISIT (OUTPATIENT)
Dept: ORTHOPEDIC SURGERY | Facility: CLINIC | Age: 53
End: 2023-10-05
Payer: COMMERCIAL

## 2023-10-05 DIAGNOSIS — M67.432 GANGLION OF LEFT WRIST: ICD-10-CM

## 2023-10-05 DIAGNOSIS — G56.02 LEFT CARPAL TUNNEL SYNDROME: Primary | ICD-10-CM

## 2023-10-05 PROCEDURE — 1036F TOBACCO NON-USER: CPT | Performed by: ORTHOPAEDIC SURGERY

## 2023-10-05 PROCEDURE — 99024 POSTOP FOLLOW-UP VISIT: CPT | Performed by: ORTHOPAEDIC SURGERY

## 2023-10-05 PROCEDURE — L3908 WHO COCK-UP NONMOLDE PRE OTS: HCPCS | Performed by: ORTHOPAEDIC SURGERY

## 2023-10-05 NOTE — LETTER
October 5, 2023     Scott Mckeon MD  [address] [address]    Patient: Lakeshia Freedman   YOB: 1970   Date of Visit: 10/5/2023       Dear Dr. Scott Mckeon MD:    Thank you for referring Lakeshia Freedman to me for evaluation. Below are my notes for this consultation.  If you have questions, please do not hesitate to call me. I look forward to following your patient along with you.       Sincerely,     Kavin Page, DO      CC: No Recipients  ______________________________________________________________________________________        10/5/2023    Chief Complaint   Patient presents with   • Left Wrist - Post-op     Lt CTR with excision of cyst  DOS: 9/22/23       History of Present Illness:  Patient Lakeshia Freedman , 53 y.o. female, presents today, 10/5/2023, for evaluation of left wrist  carpal tunnel release with ganglion excision 2 weeks postop.  She states she is doing well.  Numbness and tingling has improved through median nerve distribution.  Denies any fever chills constitutional symptoms.  Mild soreness through zone of surgical dissection.       Review of Systems:   GENERAL: Negative  GI: Negative  MUSCULOSKELETAL: See HPI  SKIN: Negative  NEURO:  Negative     Physical Exam:  GENERAL:  Alert and oriented to person, place, and time.  No acute distress and breathing comfortably; pleasant and cooperative with the examination.  HEENT:  Head is normocephalic and atraumatic.  NECK:  Supple, no visible swelling.  CARDIOVASCULAR:  No palpable tachycardia.  LUNGS:  No audible wheezing or labored breathing.  ABDOMEN:  Nondistended.  Extremities: The surgical incision is clean, dry, intact, and appears to be healing well.  No active bleeding, erythema, warmth, drainage, or signs of infection.  Appropriate functional ROM demonstrated with flexion/extension of the digits, and flexion/extension/pronosupination of the wrist.     Imaging:  Surgical pathology results show findings  consistent with benign fibroconnective tissue with pseudocyst formation consistent with ganglion cyst.     Assessment:  Left carpal tunnel syndrome improved 2 weeks out from carpal tunnel release, left wrist ganglion cyst excision.     Plan:  Sutures were removed in the office today.  The patient will have restrictions of 1 pound maximum weightbearing to the left upper extremity for 2 weeks, then she can progress to weightbearing as tolerated..  We discussed and reviewed home exercise program for range of motion recovery, scar massage, and desensitization techniques.  Occupational Therapy referral order was provided.  The patient will follow up with our office only on an as-needed basis if symptoms dictate.  All patient questions answered at today's visit.  We will fill out her [facility] paperwork/return to work forms allowing her to return on 11/6/2023 without restrictions.    Karla Soria PA-C

## 2023-10-05 NOTE — PROGRESS NOTES
10/5/2023    Chief Complaint   Patient presents with    Left Wrist - Post-op     Lt CTR with excision of cyst  DOS: 9/22/23       History of Present Illness:  Patient Lakeshia Freedman , 53 y.o. female, presents today, 10/5/2023, for evaluation of left wrist  carpal tunnel release with ganglion excision 2 weeks postop.  She states she is doing well.  Numbness and tingling has improved through median nerve distribution.  Denies any fever chills constitutional symptoms.  Mild soreness through zone of surgical dissection.       Review of Systems:   GENERAL: Negative  GI: Negative  MUSCULOSKELETAL: See HPI  SKIN: Negative  NEURO:  Negative     Physical Exam:  GENERAL:  Alert and oriented to person, place, and time.  No acute distress and breathing comfortably; pleasant and cooperative with the examination.  HEENT:  Head is normocephalic and atraumatic.  NECK:  Supple, no visible swelling.  CARDIOVASCULAR:  No palpable tachycardia.  LUNGS:  No audible wheezing or labored breathing.  ABDOMEN:  Nondistended.  Extremities: The surgical incision is clean, dry, intact, and appears to be healing well.  No active bleeding, erythema, warmth, drainage, or signs of infection.  Appropriate functional ROM demonstrated with flexion/extension of the digits, and flexion/extension/pronosupination of the wrist.     Imaging:  Surgical pathology results show findings consistent with benign fibroconnective tissue with pseudocyst formation consistent with ganglion cyst.     Assessment:  Left carpal tunnel syndrome improved 2 weeks out from carpal tunnel release, left wrist ganglion cyst excision.     Plan:  Sutures were removed in the office today.  The patient will have restrictions of 1 pound maximum weightbearing to the left upper extremity for 2 weeks, then she can progress to weightbearing as tolerated..  We discussed and reviewed home exercise program for range of motion recovery, scar massage, and desensitization techniques.   Occupational Therapy referral order was provided.  The patient will follow up with our office only on an as-needed basis if symptoms dictate.  All patient questions answered at today's visit.  We will fill out her [facility] paperwork/return to work forms allowing her to return on 11/6/2023 without restrictions.    Karla Soria PA-C

## 2023-10-27 ENCOUNTER — TELEPHONE (OUTPATIENT)
Dept: ORTHOPEDIC SURGERY | Facility: CLINIC | Age: 53
End: 2023-10-27
Payer: COMMERCIAL

## 2023-10-27 NOTE — TELEPHONE ENCOUNTER
Patient called stating that she is still having trouble with her left hand s/p CTR, still having numbness, weakness and trouble with her .  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  I told her that I could give her a letter to send her back with restrictions of not lifting more than 5 pounds with her left hand for at least 6 weeks to give her more time to work out the numbness and pain in her hand.  She stated that she actually has a form that has to be filled out.  She said she will drop it off on Monday to be filled out.

## 2023-10-30 ENCOUNTER — TELEPHONE (OUTPATIENT)
Dept: ORTHOPEDIC SURGERY | Facility: CLINIC | Age: 53
End: 2023-10-30
Payer: COMMERCIAL

## 2023-10-30 NOTE — TELEPHONE ENCOUNTER
Called patient back, told her I could give her a note to be off of work for the next couple of weeks to to give her more time to get her hand back to being more normal, she said that would be fine, but her employer and the disability would want her to have a visit that has documentation to support that she is still having pain and numbness in her hand, I had Maci add her on for 9:30 tomorrow.

## 2023-10-31 ENCOUNTER — OFFICE VISIT (OUTPATIENT)
Dept: ORTHOPEDIC SURGERY | Facility: CLINIC | Age: 53
End: 2023-10-31
Payer: COMMERCIAL

## 2023-10-31 DIAGNOSIS — M67.432 GANGLION OF LEFT WRIST: ICD-10-CM

## 2023-10-31 DIAGNOSIS — G56.00 CARPAL TUNNEL SYNDROME, UNSPECIFIED LATERALITY: Primary | ICD-10-CM

## 2023-10-31 PROCEDURE — 99024 POSTOP FOLLOW-UP VISIT: CPT | Performed by: ORTHOPAEDIC SURGERY

## 2023-10-31 PROCEDURE — 1036F TOBACCO NON-USER: CPT | Performed by: ORTHOPAEDIC SURGERY

## 2023-10-31 NOTE — LETTER
October 31, 2023     Patient: Lakeshia Freedman   YOB: 1970   Date of Visit: 10/31/2023       To Whom It May Concern:    It is my medical opinion that Lakeshia Freedman may return to work on 11/20/2023 .    If you have any questions or concerns, please don't hesitate to call.         Sincerely,        Kavin Page DO    CC: No Recipients

## 2023-10-31 NOTE — PROGRESS NOTES
10/31/2023    Chief Complaint   Patient presents with    Left Wrist - Follow-up       Lt CTR with excision of cyst  DOS: 9/22/23         History of Present Illness:  Patient Lakeshia Freedman , 53 y.o. female, presents today, 10/31/2023, for evaluation of left hand and wrist pain and numbness.  Lakeshia is approximately 6 weeks out from left carpal tunnel release and volar cyst excision.  She still has persistence of numbness and tingling, pain through zone of surgical dissection in the wrist.  She is apprehensive to return to work if she is continuing to have difficulties with use of the hand does not feel she is able to fully weight-bear.  She did have a fall after her dressing removal that caused some mild setback.       Review of Systems:   GENERAL: Negative  GI: Negative  MUSCULOSKELETAL: See HPI  SKIN: Negative  NEURO:  Negative     Physical Exam:  GENERAL:  Alert and oriented to person, place, and time.  No acute distress and breathing comfortably; pleasant and cooperative with the examination.  HEENT:  Head is normocephalic and atraumatic.  NECK:  Supple, no visible swelling.  CARDIOVASCULAR:  No palpable tachycardia.  LUNGS:  No audible wheezing or labored breathing.  ABDOMEN:  Nondistended.  Extremities: Evaluation of left upper extremity finds the patient to have a palpable radial artery at the wrist with brisk capillary refill to all digits. The patient has intact sensorium to axillary, radial, median and ulnar nerves. There are no open wounds. There are no signs of infection. There is no evidence of lymphedema or lymphatic streaking. The patient has supple compartments of the left arm, forearm and hand. Surgical incisions are well-healed.  She has full composite fist, functional arc of motion with flexion extension pronosupination of the wrist.    Imaging:  None today.       Assessment:  #1 left carpal tunnel release 6 weeks out with persistence of numbness and tingling, but mild improvement overall; #2  left volar wrist cyst excision.     Plan:  Patient can continue with weight-bear as tolerated in the left upper extremity.  We will give her referral to outpatient formal therapy to work on range of motion, scar massage, endurance and strengthening exercises.  We will give her a note to return to work on 11/20/2023 without restrictions.  We will also update her [facility] paperwork.  Follow-up with our office in 6 weeks for repeat clinical exam, no x-rays necessary upon return.  All questions answered at today's visit.    Karla Soria PA-C

## 2024-01-16 ENCOUNTER — ANCILLARY PROCEDURE (OUTPATIENT)
Dept: RADIOLOGY | Facility: CLINIC | Age: 54
End: 2024-01-16
Payer: COMMERCIAL

## 2024-01-16 ENCOUNTER — OFFICE VISIT (OUTPATIENT)
Dept: ORTHOPEDIC SURGERY | Facility: CLINIC | Age: 54
End: 2024-01-16
Payer: COMMERCIAL

## 2024-01-16 DIAGNOSIS — Z87.828 HISTORY OF KNEE SPRAIN: ICD-10-CM

## 2024-01-16 DIAGNOSIS — M23.91 INTERNAL DERANGEMENT OF RIGHT KNEE: ICD-10-CM

## 2024-01-16 DIAGNOSIS — M25.561 ACUTE PAIN OF RIGHT KNEE: ICD-10-CM

## 2024-01-16 DIAGNOSIS — M25.561 ACUTE PAIN OF RIGHT KNEE: Primary | ICD-10-CM

## 2024-01-16 DIAGNOSIS — S83.241A TEAR OF MEDIAL MENISCUS OF RIGHT KNEE, CURRENT, UNSPECIFIED TEAR TYPE, INITIAL ENCOUNTER: ICD-10-CM

## 2024-01-16 PROCEDURE — 99214 OFFICE O/P EST MOD 30 MIN: CPT | Performed by: FAMILY MEDICINE

## 2024-01-16 PROCEDURE — 73564 X-RAY EXAM KNEE 4 OR MORE: CPT | Mod: RT

## 2024-01-16 PROCEDURE — L1812 KO ELASTIC W/JOINTS PRE OTS: HCPCS | Performed by: FAMILY MEDICINE

## 2024-01-16 PROCEDURE — 1036F TOBACCO NON-USER: CPT | Performed by: FAMILY MEDICINE

## 2024-01-16 PROCEDURE — 73564 X-RAY EXAM KNEE 4 OR MORE: CPT | Mod: RIGHT SIDE | Performed by: FAMILY MEDICINE

## 2024-01-16 RX ORDER — HYDROCODONE BITARTRATE AND ACETAMINOPHEN 5; 325 MG/1; MG/1
1 TABLET ORAL EVERY 12 HOURS PRN
Qty: 14 TABLET | Refills: 0 | Status: SHIPPED | OUTPATIENT
Start: 2024-01-16 | End: 2024-01-23

## 2024-01-16 NOTE — PROGRESS NOTES
Acute Injury New Patient Visit    CC:   Chief Complaint   Patient presents with    Right Knee - Pain     Rt knee pain   Fell off ladder landed hard on feet   Xrays today       HPI: Lakeshia is a 53 y.o.female who presents today with new complaints of pain discomfort to the medial aspect of the right knee.  She states that she had fallen off of a ladder and landed hard on her feet thinks that she had hyperextended and twisted her knee.  She has inability tolerate full weightbearing she has inability to fully flex the knee.  She denies any known history of prior injury or trauma to the right knee in the past.  She presents here today for further evaluation she currently is as a phlebotomist.  She is a patient known to our practice.        Review of Systems   GENERAL: Negative for malaise, significant weight loss, fever  MUSCULOSKELETAL: See HPI  NEURO: Negative for numbness / tingling     Past Medical History  History reviewed. No pertinent past medical history.    Medication review  Medication Documentation Review Audit       Reviewed by Alba Robles MA (Technologist) on 01/16/24 at 0952      Medication Order Taking? Sig Documenting Provider Last Dose Status   ALPRAZolam (Xanax) 1 mg tablet [id number] No Take 1 tablet (1 mg) by mouth once daily as needed. Historical Provider, MD Taking Active   amphetamine-dextroamphetamine XR (Adderall XR) 25 mg 24 hr capsule [id number] No Take 1 capsule (25 mg) by mouth once daily in the morning. For ADHD Historical Provider, MD Taking Active   buPROPion XL (Wellbutrin XL) 300 mg 24 hr tablet [id number] No Take 1 tablet (300 mg) by mouth once daily. Historical Provider, MD Taking Active   DUPILUMAB SUBQ [id number] No Inject under the skin every 14 (fourteen) days. Historical Provider, MD Taking Active   Dupixent Pen 300 mg/2 mL injection [id number] No  Historical Provider, MD Taking Active   ibuprofen 200 mg tablet [id number] No Take 1 tablet (200 mg) by mouth every 6 hours if needed.  HOLDING MAY RESUME MONDAY 9/19/22 Abigail Lynn MD Taking Active   meloxicam submicronized 10 mg capsule [id number] No 1 tablet every 12 hours if needed. Abigail Lynn MD Taking Active   oxyCODONE-acetaminophen (Percocet) 5-325 mg tablet [id number] No 1 tablet every 6 hours if needed (pain). Abigail Lynn MD Not Taking Active   pantoprazole (ProtoNix) 40 mg EC tablet [id number] No Take 1 tablet (40 mg) by mouth once daily. Abigail Lynn MD Taking Active   topiramate (Topamax) 100 mg tablet [id number] No Take 1 tablet (100 mg) by mouth 2 times a day. Abigail Lynn MD Taking Active   topiramate (Topamax) 100 mg tablet [id number] No Take 1 tablet (100 mg) by mouth once daily at bedtime. Abigail Lynn MD Taking Active   traZODone (Desyrel) 50 mg tablet [id number] No Take 1 tablet (50 mg) by mouth once daily at bedtime. for depression and insomnia Abigail Lynn MD Taking Active   Ubrelvy 100 mg tablet tablet [id number] No 1 tablet (100 mg). AT ONSET OF HEADACHE. CAN TAKE SECOND TAB 2 HRS LATER. LIMIT 2 DAYS OF USE OUT OF 7DAYS Abigail Lynn MD Taking Active   Vyvanse 30 mg capsule [id number] No Take 2 capsules (60 mg) by mouth once daily in the morning. Abigail Lynn MD Taking Active   Vyvanse 60 mg capsule [id number] No Take 1 capsule (60 mg) by mouth once daily in the morning. Historical MD Shane Taking Active                    Allergies  Allergies   Allergen Reactions    Doxycycline Other     heart burn with chest pressure    Prochlorperazine Other     psychosis       Social History  Social History     Socioeconomic History    Marital status:      Spouse name: Not on file    Number of children: Not on file    Years of education: Not on file    Highest education level: Not on file   Occupational History    Not on file   Tobacco Use    Smoking status: Former     Types: Cigarettes    Smokeless tobacco: Never   Vaping Use    Vaping Use: Never used    Substance and Sexual Activity    Alcohol use: Yes    Drug use: Never    Sexual activity: Not on file   Other Topics Concern    Not on file   Social History Narrative    Not on file     Social Determinants of Health     Financial Resource Strain: Not on file   Food Insecurity: Not on file   Transportation Needs: Not on file   Physical Activity: Not on file   Stress: Not on file   Social Connections: Not on file   Intimate Partner Violence: Not on file   Housing Stability: Not on file       Surgical History  Past Surgical History:   Procedure Laterality Date    OTHER SURGICAL HISTORY  02/26/2022    Cholecystectomy    OTHER SURGICAL HISTORY  02/26/2022    Colonoscopy    OTHER SURGICAL HISTORY  02/26/2022    Lumpectomy    OTHER SURGICAL HISTORY  02/26/2022    Tonsillectomy    OTHER SURGICAL HISTORY  08/02/2022    Esophagogastroduodenoscopy    OTHER SURGICAL HISTORY  07/12/2022    Hysterectomy       Physical Exam:  GENERAL:  Patient is awake, alert, and oriented to person place and time.  Patient appears well nourished and well kept.  Affect Calm, Not Acutely Distressed.  HEENT:  Normocephalic, Atraumatic, EOMI  CARDIOVASCULAR:  Hemodynamically stable.  RESPIRATORY:  Normal respirations with unlabored breathing.  NEURO: Gross sensation intact to the lower extremities bilaterally.  Extremity: Right knee exam: The affected Right knee was examined and inspected and was tender to touch along the medial and lateral aspect with minimal catching, locking, or mechanical symptoms.  The skin was intact without breakdown no open cuts wounds or sores were present. Prior incisions if present were healed without issue. An effusion was present, with a full intact extensor mechanism.  Range of motion of the knee demonstrated limited flexion to 75 degrees and extension out to 0 degrees.   There was a positive Oswald and Modified Appley exam seen with evidence of instability in the [medial] collateral ligament ligaments.      Special  tests of the knee: Lachman was [negative], Anterior Drawer was [negative], and Posterior Drawer [negative].  There was no evidence of any foot drop, numbness, tingling, or burning to the lower extremity.  Lower Extremity sensation, distal pulses, and reflexes in the foot and ankle were preserved.  Patient was able to tolerate minimal full weight bearing secondary to discomfort.  The patient's gait was antalgic secondary to this pain.      Diagnostics: X-rays today demonstrate no obvious fracture        Procedure: None  Procedures    Assessment:   Problem List Items Addressed This Visit    None  Visit Diagnoses       Acute pain of right knee    -  Primary    Relevant Medications    HYDROcodone-acetaminophen (Norco) 5-325 mg tablet    Other Relevant Orders    XR knee right 4+ views    Knee Brace, Hinged    MR knee right wo IV contrast    History of knee sprain        Relevant Medications    HYDROcodone-acetaminophen (Norco) 5-325 mg tablet    Other Relevant Orders    Knee Brace, Hinged    MR knee right wo IV contrast    Internal derangement of right knee        Relevant Medications    HYDROcodone-acetaminophen (Norco) 5-325 mg tablet    Other Relevant Orders    Knee Brace, Hinged    MR knee right wo IV contrast    Tear of medial meniscus of right knee, current, unspecified tear type, initial encounter        Relevant Medications    HYDROcodone-acetaminophen (Norco) 5-325 mg tablet             Plan: Discussed the concerning nature of the injury with the patient at the bedside.  Will offer her a comfortable supportive hinged knee brace in addition to pain medication.  OARRS was checked and okay for a 9 out of 10 pain.  She will follow-up with one of her knee  going forward for further evaluation once the MRI is complete.  Will allow her to work light activity as tolerated with the brace on.  She should continue with icing and limited any worsening or painful activities as able.  Orders Placed This  Encounter    Knee Brace, Hinged    XR knee right 4+ views    MR knee right wo IV contrast    HYDROcodone-acetaminophen (Norco) 5-325 mg tablet      At the conclusion of the visit there were no further questions by the patient/family regarding their plan of care.  Patient was instructed to call or return with any issues, questions, or concerns regarding their injury and/or treatment plan described above.     01/16/24 at 10:41 AM - Cole C Budinsky, MD    Office: [phone]    This note was prepared using voice recognition software.  The details of this note are correct and have been reviewed, and corrected to the best of my ability.  Some grammatical errors may persist related to the Dragon software.

## 2024-05-16 ENCOUNTER — OFFICE VISIT (OUTPATIENT)
Dept: PRIMARY CARE | Facility: CLINIC | Age: 54
End: 2024-05-16
Payer: COMMERCIAL

## 2024-05-16 ENCOUNTER — LAB (OUTPATIENT)
Dept: LAB | Facility: LAB | Age: 54
End: 2024-05-16
Payer: COMMERCIAL

## 2024-05-16 VITALS
RESPIRATION RATE: 18 BRPM | SYSTOLIC BLOOD PRESSURE: 122 MMHG | HEIGHT: 63 IN | DIASTOLIC BLOOD PRESSURE: 74 MMHG | BODY MASS INDEX: 28.28 KG/M2 | HEART RATE: 76 BPM | WEIGHT: 159.6 LBS | TEMPERATURE: 97.6 F | OXYGEN SATURATION: 97 %

## 2024-05-16 DIAGNOSIS — M54.50 CHRONIC LEFT-SIDED LOW BACK PAIN WITHOUT SCIATICA: ICD-10-CM

## 2024-05-16 DIAGNOSIS — E66.3 OVERWEIGHT WITH BODY MASS INDEX (BMI) OF 28 TO 28.9 IN ADULT: ICD-10-CM

## 2024-05-16 DIAGNOSIS — F33.1 MODERATE EPISODE OF RECURRENT MAJOR DEPRESSIVE DISORDER (MULTI): ICD-10-CM

## 2024-05-16 DIAGNOSIS — Z00.00 HEALTHCARE MAINTENANCE: Primary | ICD-10-CM

## 2024-05-16 DIAGNOSIS — G89.29 CHRONIC LEFT-SIDED LOW BACK PAIN WITHOUT SCIATICA: ICD-10-CM

## 2024-05-16 DIAGNOSIS — Z00.00 HEALTHCARE MAINTENANCE: ICD-10-CM

## 2024-05-16 DIAGNOSIS — Z79.899 MEDICATION MANAGEMENT: ICD-10-CM

## 2024-05-16 DIAGNOSIS — F41.1 GENERALIZED ANXIETY DISORDER: ICD-10-CM

## 2024-05-16 PROBLEM — E66.9 OBESITY (BMI 30-39.9): Status: RESOLVED | Noted: 2023-09-29 | Resolved: 2024-05-16

## 2024-05-16 LAB
ALBUMIN SERPL BCP-MCNC: 4.9 G/DL (ref 3.4–5)
ALP SERPL-CCNC: 71 U/L (ref 33–110)
ALT SERPL W P-5'-P-CCNC: 15 U/L (ref 7–45)
ANION GAP SERPL CALC-SCNC: 10 MMOL/L (ref 10–20)
AST SERPL W P-5'-P-CCNC: 18 U/L (ref 9–39)
BASOPHILS # BLD AUTO: 0.04 X10*3/UL (ref 0–0.1)
BASOPHILS NFR BLD AUTO: 0.6 %
BILIRUB SERPL-MCNC: 0.4 MG/DL (ref 0–1.2)
BUN SERPL-MCNC: 15 MG/DL (ref 6–23)
CALCIUM SERPL-MCNC: 9.9 MG/DL (ref 8.6–10.3)
CHLORIDE SERPL-SCNC: 104 MMOL/L (ref 98–107)
CHOLEST SERPL-MCNC: 169 MG/DL (ref 0–199)
CHOLESTEROL/HDL RATIO: 3.4
CO2 SERPL-SCNC: 29 MMOL/L (ref 21–32)
CREAT SERPL-MCNC: 0.9 MG/DL (ref 0.5–1.05)
EGFRCR SERPLBLD CKD-EPI 2021: 77 ML/MIN/1.73M*2
EOSINOPHIL # BLD AUTO: 0.1 X10*3/UL (ref 0–0.7)
EOSINOPHIL NFR BLD AUTO: 1.4 %
ERYTHROCYTE [DISTWIDTH] IN BLOOD BY AUTOMATED COUNT: 13.7 % (ref 11.5–14.5)
GLUCOSE SERPL-MCNC: 88 MG/DL (ref 74–99)
HCT VFR BLD AUTO: 43.9 % (ref 36–46)
HDLC SERPL-MCNC: 50 MG/DL
HGB BLD-MCNC: 14.3 G/DL (ref 12–16)
IMM GRANULOCYTES # BLD AUTO: 0.02 X10*3/UL (ref 0–0.7)
IMM GRANULOCYTES NFR BLD AUTO: 0.3 % (ref 0–0.9)
LDLC SERPL CALC-MCNC: 96 MG/DL
LYMPHOCYTES # BLD AUTO: 1.98 X10*3/UL (ref 1.2–4.8)
LYMPHOCYTES NFR BLD AUTO: 28.5 %
MCH RBC QN AUTO: 30.2 PG (ref 26–34)
MCHC RBC AUTO-ENTMCNC: 32.6 G/DL (ref 32–36)
MCV RBC AUTO: 93 FL (ref 80–100)
MONOCYTES # BLD AUTO: 0.46 X10*3/UL (ref 0.1–1)
MONOCYTES NFR BLD AUTO: 6.6 %
NEUTROPHILS # BLD AUTO: 4.35 X10*3/UL (ref 1.2–7.7)
NEUTROPHILS NFR BLD AUTO: 62.6 %
NON HDL CHOLESTEROL: 119 MG/DL (ref 0–149)
NRBC BLD-RTO: 0 /100 WBCS (ref 0–0)
PLATELET # BLD AUTO: 287 X10*3/UL (ref 150–450)
POC AMPHETAMINE: NEGATIVE NG/ML
POC BARBITURATES: NEGATIVE NG/ML
POC BENZODIAZEPINES: NEGATIVE NG/ML
POC BUPRENORPHINE URINE: NEGATIVE NG/ML
POC COCAINE: NEGATIVE NG/ML
POC MDMA (NG/ML) IN URINE: NEGATIVE NG/ML
POC METHADONE MANUALLY ENTERED: NEGATIVE NG/ML
POC METHAMPHETAMINE: NEGATIVE NG/ML
POC MORPHINE URINE: NEGATIVE NG/ML
POC OPIATES: NEGATIVE NG/ML
POC OXYCODONE: NEGATIVE NG/ML
POC PHENCYCLIDINE (PCP): NEGATIVE NG/ML
POC THC: NEGATIVE NG/ML
POC TICYCLIC ANTIDEPRESSANTS (TCA): NEGATIVE NG/ML
POTASSIUM SERPL-SCNC: 4.3 MMOL/L (ref 3.5–5.3)
PROT SERPL-MCNC: 7.7 G/DL (ref 6.4–8.2)
RBC # BLD AUTO: 4.73 X10*6/UL (ref 4–5.2)
SODIUM SERPL-SCNC: 139 MMOL/L (ref 136–145)
TRIGL SERPL-MCNC: 114 MG/DL (ref 0–149)
VLDL: 23 MG/DL (ref 0–40)
WBC # BLD AUTO: 7 X10*3/UL (ref 4.4–11.3)

## 2024-05-16 PROCEDURE — 85025 COMPLETE CBC W/AUTO DIFF WBC: CPT

## 2024-05-16 PROCEDURE — 80061 LIPID PANEL: CPT

## 2024-05-16 PROCEDURE — 3008F BODY MASS INDEX DOCD: CPT | Performed by: FAMILY MEDICINE

## 2024-05-16 PROCEDURE — 36415 COLL VENOUS BLD VENIPUNCTURE: CPT

## 2024-05-16 PROCEDURE — 80053 COMPREHEN METABOLIC PANEL: CPT

## 2024-05-16 PROCEDURE — 80305 DRUG TEST PRSMV DIR OPT OBS: CPT | Performed by: FAMILY MEDICINE

## 2024-05-16 PROCEDURE — 99396 PREV VISIT EST AGE 40-64: CPT | Performed by: FAMILY MEDICINE

## 2024-05-16 RX ORDER — SEMAGLUTIDE 0.25 MG/.5ML
0.25 INJECTION, SOLUTION SUBCUTANEOUS
Qty: 2 ML | Refills: 2 | Status: SHIPPED | OUTPATIENT
Start: 2024-05-16

## 2024-05-16 RX ORDER — ALPRAZOLAM 1 MG/1
1 TABLET ORAL DAILY PRN
Qty: 30 TABLET | Refills: 0 | Status: SHIPPED | OUTPATIENT
Start: 2024-05-16

## 2024-05-16 RX ORDER — SEMAGLUTIDE 0.25 MG/.5ML
0.25 INJECTION, SOLUTION SUBCUTANEOUS
Refills: 2 | OUTPATIENT
Start: 2024-05-16

## 2024-05-16 RX ORDER — TIZANIDINE 4 MG/1
4 TABLET ORAL NIGHTLY PRN
Qty: 30 TABLET | Refills: 0 | Status: SHIPPED | OUTPATIENT
Start: 2024-05-16 | End: 2024-06-15

## 2024-05-16 RX ORDER — SEMAGLUTIDE 0.5 MG/.5ML
0.5 INJECTION, SOLUTION SUBCUTANEOUS
Qty: 2 ML | Refills: 2 | Status: CANCELLED | OUTPATIENT
Start: 2024-05-16

## 2024-05-16 RX ORDER — VALACYCLOVIR HYDROCHLORIDE 1 G/1
1000 TABLET, FILM COATED ORAL 2 TIMES DAILY
COMMUNITY

## 2024-05-16 RX ORDER — ESCITALOPRAM OXALATE 10 MG/1
10 TABLET ORAL DAILY
Qty: 30 TABLET | Refills: 1 | Status: SHIPPED | OUTPATIENT
Start: 2024-05-16

## 2024-05-16 RX ORDER — BUPROPION HYDROCHLORIDE 150 MG/1
150 TABLET ORAL EVERY MORNING
Qty: 30 TABLET | Refills: 1 | Status: SHIPPED | OUTPATIENT
Start: 2024-05-16 | End: 2024-07-15

## 2024-05-16 ASSESSMENT — ANXIETY QUESTIONNAIRES
5. BEING SO RESTLESS THAT IT IS HARD TO SIT STILL: SEVERAL DAYS
IF YOU CHECKED OFF ANY PROBLEMS ON THIS QUESTIONNAIRE, HOW DIFFICULT HAVE THESE PROBLEMS MADE IT FOR YOU TO DO YOUR WORK, TAKE CARE OF THINGS AT HOME, OR GET ALONG WITH OTHER PEOPLE: NOT DIFFICULT AT ALL
7. FEELING AFRAID AS IF SOMETHING AWFUL MIGHT HAPPEN: NOT AT ALL
6. BECOMING EASILY ANNOYED OR IRRITABLE: NOT AT ALL
4. TROUBLE RELAXING: MORE THAN HALF THE DAYS
1. FEELING NERVOUS, ANXIOUS, OR ON EDGE: MORE THAN HALF THE DAYS
2. NOT BEING ABLE TO STOP OR CONTROL WORRYING: MORE THAN HALF THE DAYS
3. WORRYING TOO MUCH ABOUT DIFFERENT THINGS: MORE THAN HALF THE DAYS
GAD7 TOTAL SCORE: 9

## 2024-05-16 ASSESSMENT — ENCOUNTER SYMPTOMS
WEAKNESS: 1
DEPRESSED MOOD: 0
NUMBNESS: 1
TINGLING: 1
IRRITABILITY: 0
SHORTNESS OF BREATH: 0
BACK PAIN: 1
NERVOUS/ANXIOUS: 1

## 2024-05-16 ASSESSMENT — PATIENT HEALTH QUESTIONNAIRE - PHQ9
10. IF YOU CHECKED OFF ANY PROBLEMS, HOW DIFFICULT HAVE THESE PROBLEMS MADE IT FOR YOU TO DO YOUR WORK, TAKE CARE OF THINGS AT HOME, OR GET ALONG WITH OTHER PEOPLE: NOT DIFFICULT AT ALL
SUM OF ALL RESPONSES TO PHQ9 QUESTIONS 1 AND 2: 2
1. LITTLE INTEREST OR PLEASURE IN DOING THINGS: NOT AT ALL
2. FEELING DOWN, DEPRESSED OR HOPELESS: MORE THAN HALF THE DAYS

## 2024-05-16 NOTE — PROGRESS NOTES
Subjective   Patient ID: Lakeshia Freedman is a 54 y.o. female who presents for Annual Exam, Follow-up (Anxiety, and Depression), and Back Pain.    Patient presents today for annual physical exam.    She is here for discussion regarding weight loss. History of eating disorders: none. There is a family history positive for obesity in the patient. Previous treatments for obesity include self-directed dieting. Obesity associated medical conditions: none. Obesity associated medications: none. Cardiovascular risk factors besides obesity: none     Anxiety  Presents for follow-up visit. Symptoms include nervous/anxious behavior. Patient reports no chest pain, depressed mood, dizziness, irritability, palpitations, shortness of breath or suicidal ideas. Symptoms occur most days. The severity of symptoms is mild. The patient sleeps 5 hours per night. The quality of sleep is fair. Nighttime awakenings: several.     Compliance with medications is 51-75%.   Back Pain  This is a recurrent problem. The current episode started more than 1 year ago. The problem occurs intermittently. The pain is present in the lumbar spine. The quality of the pain is described as aching. The pain does not radiate. The pain is at a severity of 2/10. The pain is mild. The pain is The same all the time. Stiffness is present All day. Associated symptoms include numbness, tingling and weakness. Pertinent negatives include no abdominal pain, chest pain, dysuria, fever or headaches. She has tried nothing for the symptoms. The treatment provided no relief.     Review of Systems   Constitutional:  Negative for chills, fever and irritability.   HENT:  Negative for congestion, ear pain, nosebleeds, rhinorrhea and sore throat.    Respiratory:  Negative for cough, shortness of breath and wheezing.    Cardiovascular:  Negative for chest pain, palpitations and leg swelling.   Gastrointestinal:  Negative for abdominal pain, constipation, diarrhea and vomiting.  "  Genitourinary:  Negative for dysuria, frequency and hematuria.   Musculoskeletal:  Positive for back pain.   Neurological:  Positive for tingling, weakness and numbness. Negative for dizziness, tremors and headaches.   Psychiatric/Behavioral:  Negative for suicidal ideas. The patient is nervous/anxious.        Objective   /74 (BP Location: Left arm, Patient Position: Sitting)   Pulse 76   Temp 36.4 °C (97.6 °F)   Resp 18   Ht 1.6 m (5' 3\")   Wt 72.4 kg (159 lb 9.6 oz)   SpO2 97%   BMI 28.27 kg/m²     Physical Exam  Constitutional:       General: She is not in acute distress.     Appearance: Normal appearance.   HENT:      Head: Normocephalic and atraumatic.      Mouth/Throat:      Mouth: Mucous membranes are moist.      Pharynx: Oropharynx is clear. No oropharyngeal exudate or posterior oropharyngeal erythema.   Eyes:      General: No scleral icterus.     Extraocular Movements: Extraocular movements intact.      Pupils: Pupils are equal, round, and reactive to light.   Cardiovascular:      Rate and Rhythm: Normal rate and regular rhythm.      Pulses: Normal pulses.      Heart sounds: No murmur heard.     No friction rub. No gallop.   Pulmonary:      Effort: Pulmonary effort is normal.      Breath sounds: No wheezing, rhonchi or rales.   Skin:     General: Skin is warm.      Coloration: Skin is not jaundiced or pale.      Findings: No erythema or rash.   Neurological:      General: No focal deficit present.      Mental Status: She is alert and oriented to person, place, and time.      Cranial Nerves: No cranial nerve deficit.      Sensory: No sensory deficit.      Coordination: Coordination normal.      Gait: Gait normal.         Assessment/Plan   Problem List Items Addressed This Visit       Chronic left-sided low back pain without sciatica     We will prescribe Tizanidine for her to take nightly as needed.  Natural history and expected course discussed. Questions answered.  Educational material " distributed.  Proper lifting, bending technique discussed.  Stretching exercises discussed.  Regular aerobic and trunk strengthening exercises discussed.  Ice to affected area as needed for local pain relief.  Heat to affected area as needed for local pain relief.         Relevant Medications    tiZANidine (Zanaflex) 4 mg tablet    Generalized anxiety disorder     We will start her on Lexapro 10 mg daily.  The risks and benefits of my recommendations, as well as other treatment options were discussed with the patient today.    The side effects of the medications were discussed.  Advised not to take the medication with alcohol .  Exercise regularly and this can give you a sense of well being and help decrease feelings of anxiety.;  Get plenty of sleep. Sleep rests your brain as well as your body, and can improve your general sense of wellbeing as well as your mood.;  Avoid alcohol and drug abuse. It may seem that alcohol or drugs relax you. But in the long run they make anxiety worse and cause more problems.;  Avoid caffeine. Caffeine is found in coffee, tea, soft drinks and chocolate. Caffeine may increase your sense of anxiety because it stimulates your nervous system. Also avoid over-the-counter diet pills, and cough and cold medicines that contain a decongestant.;  Confront the things that have made you anxious in the past. Begin by just picturing yourself confronting these things. By doing this, you can get used to the idea of confronting the things that make you anxious before you actually do it. After you feel more comfortable picturing yourself confronting these things, you can begin to actually face them.;  If you feel yourself getting anxious, practice a relaxation technique or focus on a simple task, such as counting backward from 100 to 0.;  Although feelings of anxiety are scary, they won't hurt you. Label the level of your fear from 0 to 10 and keep track as it goes up and down. Notice that it doesn't  stay at a very high level for more than a few seconds. When the fear comes, accept it. Wait and give it time to pass without running away from it.;  Take medications as prescribed.         Relevant Medications    buPROPion XL (Wellbutrin XL) 150 mg 24 hr tablet    escitalopram (Lexapro) 10 mg tablet    ALPRAZolam (Xanax) 1 mg tablet    Other Relevant Orders    Follow Up In [facility] - PCP - Established    POCT waived urine drug screen manually resulted (Completed)    Healthcare maintenance - Primary     Recommend low-cholesterol diet, low-fat diet and low-salt diet.  The need for lifelong dietary compliance in order to reduce cardiac risk is recommended.  We will also recommend regular exercise program to improve lipid balance and overall health.  Recommend decreasing fat and cholesterol in diet, increasing aerobic exercise with a goal of 4 or more days per week         Relevant Orders    CBC and Auto Differential    Comprehensive Metabolic Panel    Lipid Panel    Moderate episode of recurrent major depressive disorder (Multi)     We will decrease the Wellbutrin XL to 150 mg daily.  Reviewed concept of depression as biochemical imbalance of neurotransmitters and rationale for treatment. Instructed patient to contact office or on-call physician promptly should condition worsen or any new symptoms appear.          Relevant Medications    buPROPion XL (Wellbutrin XL) 150 mg 24 hr tablet    escitalopram (Lexapro) 10 mg tablet    Other Relevant Orders    Follow Up In [facility] - PCP - Established    Overweight with body mass index (BMI) of 28 to 28.9 in adult     We will start her on Wegovy 0.25 mg weekly for 1 month then increase Wegovy to 0.5 mg weekly.  General weight loss/lifestyle modification strategies discussed (elicit support from others; identify saboteurs; non-food rewards, etc).  Behavioral treatment  Diet interventions: diet diary indefinitely, low calorie (1500 kCal/d) deficit diet,  and qualitative changes (increase low-fat, high-fiber foods).  Regular aerobic exercise program discussed.  Pharmacotherapy as ordered.         Relevant Medications    Wegovy 0.25 mg/0.5 mL pen injector     Other Visit Diagnoses       Medication management        Relevant Orders    POCT waived urine drug screen manually resulted (Completed)          OAR[facility]:  Scott Mckeon MD on 5/16/2024  3:43 PM  I have personally reviewed the OAR[facility] report for Lakeshia Freedman. I have considered the risks of abuse, dependence, addiction and diversion    Is the patient prescribed a combination of a benzodiazepine and opioid?  No    Last Urine Drug Screen / ordered today: Yes  Recent Results (from the past 8760 hour(s))   POCT waived urine drug screen manually resulted    Collection Time: 05/16/24  4:00 PM   Result Value Ref Range    POC THC Negative Negative, Not applicable ng/mL    POC Cocaine Negative Negative, Not applicable ng/mL    POC Opiates Negative Negative, Not applicable ng/mL    POC Amphetamine Negative Negative, Not applicable ng/mL    POC Phencyclidine (PCP) Negative Negative, Not applicable ng/mL    POC Barbiturates Negative Negative, Not applicable ng/mL    POC Benzodiazepines Negative Negative, Not applicable ng/mL    POC Methamphetamine Negative Negative, Not applicable ng/mL    POC METHADONE MANUALLY ENTERED Negative Negative, Not applicable ng/mL    POC Ticyclic Antidepressants (TCA) Negative Negative, Not applicable ng/mL    POC Oxycodone Negative Negative, Not applicable ng/mL    POC MDMA URINE Negative Negative, Not applicable ng/mL    POC Morphine Urine Negative Negative, Not applicable ng/mL    POC Burprenorphine Urine Negative Negative, Not applicable ng/mL       Controlled Substance Agreement:  Date of the Last Agreement: 5/16/2024  Reviewed Controlled Substance Agreement including but not limited to the benefits, risks, and alternatives to treatment with a Controlled Substance  medication(s).    Benzodiazepines:  What is the patient's goal of therapy? Able to function without being unduly anxious and carry out day-to-day activities without tension and panic attacks    Is this being achieved with current treatment? yes    MARLEY-7:  Over the last 2 weeks, how often have you been bothered by any of the following problems?  Feeling nervous, anxious, or on edge: 2  Not being able to stop or control worryin  Worrying too much about different things: 2  Trouble relaxin  Being so restless that it is hard to sit still: 1  Becoming easily annoyed or irritable: 0  Feeling afraid as if something awful might happen: 0  MARLEY-7 Total Score: 9    Activities of Daily Living:   Is your overall impression that this patient is benefiting (symptom reduction outweighs side effects) from benzodiazepine therapy? Yes     1. Physical Functioning: Better  2. Family Relationship: Better  3. Social Relationship: Better  4. Mood: Better  5. Sleep Patterns: Better  6. Overall Function: Better    Scribe Attestation  By signing my name below, I, Priyank Reyes   attest that this documentation has been prepared under the direction and in the presence of Soctt Mckeon MD.

## 2024-05-16 NOTE — ASSESSMENT & PLAN NOTE
We will start her on Wegovy 0.25 mg weekly for 1 month then increase Wegovy to 0.5 mg weekly.  General weight loss/lifestyle modification strategies discussed (elicit support from others; identify saboteurs; non-food rewards, etc).  Behavioral treatment  Diet interventions: diet diary indefinitely, low calorie (1500 kCal/d) deficit diet, and qualitative changes (increase low-fat, high-fiber foods).  Regular aerobic exercise program discussed.  Pharmacotherapy as ordered.

## 2024-05-16 NOTE — ASSESSMENT & PLAN NOTE
We will start her on Lexapro 10 mg daily.  The risks and benefits of my recommendations, as well as other treatment options were discussed with the patient today.    The side effects of the medications were discussed.  Advised not to take the medication with alcohol .  Exercise regularly and this can give you a sense of well being and help decrease feelings of anxiety.;  Get plenty of sleep. Sleep rests your brain as well as your body, and can improve your general sense of wellbeing as well as your mood.;  Avoid alcohol and drug abuse. It may seem that alcohol or drugs relax you. But in the long run they make anxiety worse and cause more problems.;  Avoid caffeine. Caffeine is found in coffee, tea, soft drinks and chocolate. Caffeine may increase your sense of anxiety because it stimulates your nervous system. Also avoid over-the-counter diet pills, and cough and cold medicines that contain a decongestant.;  Confront the things that have made you anxious in the past. Begin by just picturing yourself confronting these things. By doing this, you can get used to the idea of confronting the things that make you anxious before you actually do it. After you feel more comfortable picturing yourself confronting these things, you can begin to actually face them.;  If you feel yourself getting anxious, practice a relaxation technique or focus on a simple task, such as counting backward from 100 to 0.;  Although feelings of anxiety are scary, they won't hurt you. Label the level of your fear from 0 to 10 and keep track as it goes up and down. Notice that it doesn't stay at a very high level for more than a few seconds. When the fear comes, accept it. Wait and give it time to pass without running away from it.;  Take medications as prescribed.

## 2024-05-16 NOTE — ASSESSMENT & PLAN NOTE
We will decrease the Wellbutrin XL to 150 mg daily.  Reviewed concept of depression as biochemical imbalance of neurotransmitters and rationale for treatment. Instructed patient to contact office or on-call physician promptly should condition worsen or any new symptoms appear.

## 2024-05-16 NOTE — ASSESSMENT & PLAN NOTE
We will prescribe Tizanidine for her to take nightly as needed.  Natural history and expected course discussed. Questions answered.  Educational material distributed.  Proper lifting, bending technique discussed.  Stretching exercises discussed.  Regular aerobic and trunk strengthening exercises discussed.  Ice to affected area as needed for local pain relief.  Heat to affected area as needed for local pain relief.

## 2024-05-18 ASSESSMENT — ENCOUNTER SYMPTOMS
CHILLS: 0
VOMITING: 0
HEMATURIA: 0
DIARRHEA: 0
ABDOMINAL PAIN: 0
CONSTIPATION: 0
DYSURIA: 0
COUGH: 0
HEADACHES: 0
FEVER: 0
TREMORS: 0
RHINORRHEA: 0
PALPITATIONS: 0
FREQUENCY: 0
SORE THROAT: 0
WHEEZING: 0
DIZZINESS: 0

## 2024-06-08 DIAGNOSIS — F33.1 MODERATE EPISODE OF RECURRENT MAJOR DEPRESSIVE DISORDER (MULTI): ICD-10-CM

## 2024-06-08 DIAGNOSIS — G89.29 CHRONIC LEFT-SIDED LOW BACK PAIN WITHOUT SCIATICA: ICD-10-CM

## 2024-06-08 DIAGNOSIS — F41.1 GENERALIZED ANXIETY DISORDER: ICD-10-CM

## 2024-06-08 DIAGNOSIS — M54.50 CHRONIC LEFT-SIDED LOW BACK PAIN WITHOUT SCIATICA: ICD-10-CM

## 2024-06-09 RX ORDER — ESCITALOPRAM OXALATE 10 MG/1
10 TABLET ORAL DAILY
Qty: 90 TABLET | Refills: 1 | OUTPATIENT
Start: 2024-06-09

## 2024-06-09 RX ORDER — TIZANIDINE 4 MG/1
4 TABLET ORAL NIGHTLY PRN
Qty: 90 TABLET | Refills: 1 | OUTPATIENT
Start: 2024-06-09 | End: 2024-07-09

## 2024-06-09 RX ORDER — BUPROPION HYDROCHLORIDE 150 MG/1
150 TABLET ORAL EVERY MORNING
Qty: 90 TABLET | Refills: 1 | OUTPATIENT
Start: 2024-06-09 | End: 2024-08-08

## 2024-06-20 ENCOUNTER — APPOINTMENT (OUTPATIENT)
Dept: PRIMARY CARE | Facility: CLINIC | Age: 54
End: 2024-06-20
Payer: COMMERCIAL

## 2024-06-20 ENCOUNTER — LAB (OUTPATIENT)
Dept: LAB | Facility: LAB | Age: 54
End: 2024-06-20
Payer: COMMERCIAL

## 2024-06-20 VITALS
HEART RATE: 71 BPM | OXYGEN SATURATION: 97 % | BODY MASS INDEX: 28.17 KG/M2 | RESPIRATION RATE: 16 BRPM | HEIGHT: 63 IN | DIASTOLIC BLOOD PRESSURE: 72 MMHG | WEIGHT: 159 LBS | TEMPERATURE: 97.6 F | SYSTOLIC BLOOD PRESSURE: 116 MMHG

## 2024-06-20 DIAGNOSIS — F41.1 GENERALIZED ANXIETY DISORDER: Primary | ICD-10-CM

## 2024-06-20 DIAGNOSIS — F33.1 MODERATE EPISODE OF RECURRENT MAJOR DEPRESSIVE DISORDER (MULTI): ICD-10-CM

## 2024-06-20 DIAGNOSIS — R21 DISCOID RASH: ICD-10-CM

## 2024-06-20 PROCEDURE — 99213 OFFICE O/P EST LOW 20 MIN: CPT | Performed by: FAMILY MEDICINE

## 2024-06-20 PROCEDURE — 86038 ANTINUCLEAR ANTIBODIES: CPT

## 2024-06-20 PROCEDURE — 3008F BODY MASS INDEX DOCD: CPT | Performed by: FAMILY MEDICINE

## 2024-06-20 PROCEDURE — 36415 COLL VENOUS BLD VENIPUNCTURE: CPT

## 2024-06-20 PROCEDURE — 86225 DNA ANTIBODY NATIVE: CPT

## 2024-06-20 PROCEDURE — 1036F TOBACCO NON-USER: CPT | Performed by: FAMILY MEDICINE

## 2024-06-20 PROCEDURE — 86235 NUCLEAR ANTIGEN ANTIBODY: CPT

## 2024-06-20 RX ORDER — BUPROPION HYDROCHLORIDE 150 MG/1
150 TABLET ORAL EVERY MORNING
Qty: 90 TABLET | Refills: 1 | Status: SHIPPED | OUTPATIENT
Start: 2024-06-20

## 2024-06-20 RX ORDER — LISDEXAMFETAMINE DIMESYLATE 60 MG/1
CAPSULE ORAL
COMMUNITY

## 2024-06-20 RX ORDER — ESCITALOPRAM OXALATE 10 MG/1
10 TABLET ORAL DAILY
Qty: 90 TABLET | Refills: 1 | Status: SHIPPED | OUTPATIENT
Start: 2024-06-20

## 2024-06-20 RX ORDER — TOPIRAMATE 50 MG/1
TABLET, FILM COATED ORAL
COMMUNITY
Start: 2024-06-12

## 2024-06-20 RX ORDER — TRAZODONE HYDROCHLORIDE 50 MG/1
TABLET ORAL
COMMUNITY

## 2024-06-20 ASSESSMENT — ANXIETY QUESTIONNAIRES
4. TROUBLE RELAXING: NOT AT ALL
6. BECOMING EASILY ANNOYED OR IRRITABLE: NOT AT ALL
GAD7 TOTAL SCORE: 1
3. WORRYING TOO MUCH ABOUT DIFFERENT THINGS: NOT AT ALL
7. FEELING AFRAID AS IF SOMETHING AWFUL MIGHT HAPPEN: NOT AT ALL
2. NOT BEING ABLE TO STOP OR CONTROL WORRYING: NOT AT ALL
1. FEELING NERVOUS, ANXIOUS, OR ON EDGE: SEVERAL DAYS
5. BEING SO RESTLESS THAT IT IS HARD TO SIT STILL: NOT AT ALL
IF YOU CHECKED OFF ANY PROBLEMS ON THIS QUESTIONNAIRE, HOW DIFFICULT HAVE THESE PROBLEMS MADE IT FOR YOU TO DO YOUR WORK, TAKE CARE OF THINGS AT HOME, OR GET ALONG WITH OTHER PEOPLE: NOT DIFFICULT AT ALL

## 2024-06-20 ASSESSMENT — ENCOUNTER SYMPTOMS
PALPITATIONS: 0
TREMORS: 0
VOMITING: 0
DECREASED CONCENTRATION: 0
SORE THROAT: 0
FEVER: 0
HEADACHES: 0
DIZZINESS: 0
WHEEZING: 0
FREQUENCY: 0
IRRITABILITY: 0
COUGH: 0
ABDOMINAL PAIN: 0
CONSTIPATION: 0
NUMBNESS: 0
CHILLS: 0
DYSURIA: 0
SHORTNESS OF BREATH: 0
RHINORRHEA: 0
HEMATURIA: 0
DEPRESSED MOOD: 0
NERVOUS/ANXIOUS: 0
DIARRHEA: 0

## 2024-06-20 ASSESSMENT — PATIENT HEALTH QUESTIONNAIRE - PHQ9
2. FEELING DOWN, DEPRESSED OR HOPELESS: SEVERAL DAYS
10. IF YOU CHECKED OFF ANY PROBLEMS, HOW DIFFICULT HAVE THESE PROBLEMS MADE IT FOR YOU TO DO YOUR WORK, TAKE CARE OF THINGS AT HOME, OR GET ALONG WITH OTHER PEOPLE: NOT DIFFICULT AT ALL
1. LITTLE INTEREST OR PLEASURE IN DOING THINGS: NOT AT ALL
SUM OF ALL RESPONSES TO PHQ9 QUESTIONS 1 AND 2: 1

## 2024-06-20 NOTE — PROGRESS NOTES
"Subjective   Patient ID: Lakeshia Freedman is a 54 y.o. female who presents for Follow-up (Anxiety, and Depression).    She complains of a rash that only appears on her upper chest. She states when the rash flares up it welts up. Currently taking Dupixent which is prescribed by the Dermatologist and has been controlling it. She would like to be tested for Lupus since her brother has it.    Anxiety  Presents for follow-up visit. Patient reports no chest pain, decreased concentration, depressed mood, dizziness, irritability, nervous/anxious behavior, palpitations, shortness of breath or suicidal ideas. Symptoms occur rarely. The severity of symptoms is mild. The patient sleeps 7 hours per night. The quality of sleep is good. Nighttime awakenings: occasional.     Compliance with medications is %.      Review of Systems   Constitutional:  Negative for chills, fever and irritability.   HENT:  Negative for congestion, ear pain, nosebleeds, rhinorrhea and sore throat.    Respiratory:  Negative for cough, shortness of breath and wheezing.    Cardiovascular:  Negative for chest pain, palpitations and leg swelling.   Gastrointestinal:  Negative for abdominal pain, constipation, diarrhea and vomiting.   Genitourinary:  Negative for dysuria, frequency and hematuria.   Neurological:  Negative for dizziness, tremors, numbness and headaches.   Psychiatric/Behavioral:  Negative for decreased concentration and suicidal ideas. The patient is not nervous/anxious.      Objective   /72 (BP Location: Left arm, Patient Position: Sitting)   Pulse 71   Temp 36.4 °C (97.6 °F)   Resp 16   Ht 1.6 m (5' 3\")   Wt 72.1 kg (159 lb)   SpO2 97%   BMI 28.17 kg/m²     Physical Exam  Constitutional:       General: She is not in acute distress.     Appearance: Normal appearance.   HENT:      Head: Normocephalic and atraumatic.      Mouth/Throat:      Mouth: Mucous membranes are moist.      Pharynx: Oropharynx is clear. No oropharyngeal " exudate or posterior oropharyngeal erythema.   Eyes:      General: No scleral icterus.     Extraocular Movements: Extraocular movements intact.      Pupils: Pupils are equal, round, and reactive to light.   Cardiovascular:      Rate and Rhythm: Normal rate and regular rhythm.      Pulses: Normal pulses.      Heart sounds: No murmur heard.     No friction rub. No gallop.   Pulmonary:      Effort: Pulmonary effort is normal.      Breath sounds: No wheezing, rhonchi or rales.   Skin:     General: Skin is warm.      Coloration: Skin is not jaundiced or pale.      Findings: No erythema or rash.   Neurological:      General: No focal deficit present.      Mental Status: She is alert and oriented to person, place, and time.      Cranial Nerves: No cranial nerve deficit.      Sensory: No sensory deficit.      Coordination: Coordination normal.      Gait: Gait normal.         Assessment/Plan   Problem List Items Addressed This Visit       Discoid rash     Labs as ordered.         Relevant Orders    SHANIKA with Reflex to LUDIVINA    Anti-DNA Antibody, Double-Stranded    Anti-SM    Generalized anxiety disorder - Primary     Well-controlled, continue current medications and management.  The risks and benefits of my recommendations, as well as other treatment options were discussed with the patient today.    The side effects of the medications were discussed.  Advised not to take the medication with alcohol .  Exercise regularly and this can give you a sense of well being and help decrease feelings of anxiety.;  Get plenty of sleep. Sleep rests your brain as well as your body, and can improve your general sense of wellbeing as well as your mood.;  Avoid alcohol and drug abuse. It may seem that alcohol or drugs relax you. But in the long run they make anxiety worse and cause more problems.;  Avoid caffeine. Caffeine is found in coffee, tea, soft drinks and chocolate. Caffeine may increase your sense of anxiety because it stimulates your  nervous system. Also avoid over-the-counter diet pills, and cough and cold medicines that contain a decongestant.;  Confront the things that have made you anxious in the past. Begin by just picturing yourself confronting these things. By doing this, you can get used to the idea of confronting the things that make you anxious before you actually do it. After you feel more comfortable picturing yourself confronting these things, you can begin to actually face them.;  If you feel yourself getting anxious, practice a relaxation technique or focus on a simple task, such as counting backward from 100 to 0.;  Although feelings of anxiety are scary, they won't hurt you. Label the level of your fear from 0 to 10 and keep track as it goes up and down. Notice that it doesn't stay at a very high level for more than a few seconds. When the fear comes, accept it. Wait and give it time to pass without running away from it.;  Take medications as prescribed.         Relevant Medications    escitalopram (Lexapro) 10 mg tablet    buPROPion XL (Wellbutrin XL) 150 mg 24 hr tablet    Other Relevant Orders    Follow Up In [facility] - PCP - Established    Moderate episode of recurrent major depressive disorder (Multi)     Well-controlled, continue current medications and management.         Relevant Medications    escitalopram (Lexapro) 10 mg tablet    buPROPion XL (Wellbutrin XL) 150 mg 24 hr tablet    Other Relevant Orders    Follow Up In [facility] - PCP - Established     Scribe Attestation  By signing my name below, IJovi Scribe   attest that this documentation has been prepared under the direction and in the presence of Scott Mckeon MD.

## 2024-06-21 LAB
DSDNA AB SER-ACNC: <1 IU/ML
ENA SM AB SER IA-ACNC: <0.2 AI

## 2024-06-24 LAB — ANA SER QL HEP2 SUBST: NEGATIVE

## 2024-12-12 ENCOUNTER — APPOINTMENT (OUTPATIENT)
Dept: PRIMARY CARE | Facility: CLINIC | Age: 54
End: 2024-12-12
Payer: COMMERCIAL

## 2024-12-12 VITALS
TEMPERATURE: 97.3 F | DIASTOLIC BLOOD PRESSURE: 62 MMHG | WEIGHT: 150 LBS | HEART RATE: 74 BPM | RESPIRATION RATE: 14 BRPM | SYSTOLIC BLOOD PRESSURE: 118 MMHG | OXYGEN SATURATION: 97 % | BODY MASS INDEX: 26.58 KG/M2 | HEIGHT: 63 IN

## 2024-12-12 DIAGNOSIS — F33.1 MODERATE EPISODE OF RECURRENT MAJOR DEPRESSIVE DISORDER: ICD-10-CM

## 2024-12-12 DIAGNOSIS — F41.1 GENERALIZED ANXIETY DISORDER: Primary | ICD-10-CM

## 2024-12-12 DIAGNOSIS — Z12.31 ENCOUNTER FOR SCREENING MAMMOGRAM FOR BREAST CANCER: ICD-10-CM

## 2024-12-12 DIAGNOSIS — B00.1 HERPES LABIALIS: ICD-10-CM

## 2024-12-12 PROCEDURE — 99213 OFFICE O/P EST LOW 20 MIN: CPT | Performed by: FAMILY MEDICINE

## 2024-12-12 PROCEDURE — 1036F TOBACCO NON-USER: CPT | Performed by: FAMILY MEDICINE

## 2024-12-12 PROCEDURE — 3008F BODY MASS INDEX DOCD: CPT | Performed by: FAMILY MEDICINE

## 2024-12-12 RX ORDER — ALPRAZOLAM 1 MG/1
1 TABLET ORAL DAILY PRN
Qty: 30 TABLET | Refills: 0 | Status: SHIPPED | OUTPATIENT
Start: 2024-12-12

## 2024-12-12 RX ORDER — BUPROPION HYDROCHLORIDE 150 MG/1
150 TABLET ORAL EVERY MORNING
Qty: 90 TABLET | Refills: 1 | Status: SHIPPED | OUTPATIENT
Start: 2024-12-12

## 2024-12-12 RX ORDER — SERTRALINE HYDROCHLORIDE 50 MG/1
50 TABLET, FILM COATED ORAL DAILY
Qty: 30 TABLET | Refills: 3 | Status: SHIPPED | OUTPATIENT
Start: 2024-12-12

## 2024-12-12 RX ORDER — VALACYCLOVIR HYDROCHLORIDE 1 G/1
1000 TABLET, FILM COATED ORAL DAILY
Qty: 30 TABLET | Refills: 0 | Status: SHIPPED | OUTPATIENT
Start: 2024-12-12

## 2024-12-12 ASSESSMENT — ENCOUNTER SYMPTOMS
MALAISE: 0
INSOMNIA: 1
FREQUENCY: 0
COMPULSIONS: 0
VOMITING: 0
THOUGHT CONTENT - OBSESSIONS: 1
FEELING OF CHOKING: 0
TREMORS: 0
RESTLESSNESS: 0
FEVER: 0
WHEEZING: 0
ABDOMINAL PAIN: 0
DYSURIA: 0
HEMATURIA: 0
DIARRHEA: 0
PALPITATIONS: 0
IRRITABILITY: 0
RHINORRHEA: 0
DECREASED CONCENTRATION: 0
NAUSEA: 0
NERVOUS/ANXIOUS: 1
PANIC: 1
SORE THROAT: 0
SHORTNESS OF BREATH: 0
HYPERVENTILATION: 0
DIZZINESS: 0
HEADACHES: 0
COUGH: 0
DEPRESSED MOOD: 1
MUSCLE TENSION: 1
CHILLS: 0
NUMBNESS: 0
CONFUSION: 0
CONSTIPATION: 0

## 2024-12-12 ASSESSMENT — ANXIETY QUESTIONNAIRES
2. NOT BEING ABLE TO STOP OR CONTROL WORRYING: NEARLY EVERY DAY
4. TROUBLE RELAXING: MORE THAN HALF THE DAYS
IF YOU CHECKED OFF ANY PROBLEMS ON THIS QUESTIONNAIRE, HOW DIFFICULT HAVE THESE PROBLEMS MADE IT FOR YOU TO DO YOUR WORK, TAKE CARE OF THINGS AT HOME, OR GET ALONG WITH OTHER PEOPLE: SOMEWHAT DIFFICULT
1. FEELING NERVOUS, ANXIOUS, OR ON EDGE: NEARLY EVERY DAY
6. BECOMING EASILY ANNOYED OR IRRITABLE: MORE THAN HALF THE DAYS
7. FEELING AFRAID AS IF SOMETHING AWFUL MIGHT HAPPEN: SEVERAL DAYS
3. WORRYING TOO MUCH ABOUT DIFFERENT THINGS: NEARLY EVERY DAY
GAD7 TOTAL SCORE: 15
5. BEING SO RESTLESS THAT IT IS HARD TO SIT STILL: SEVERAL DAYS

## 2024-12-12 NOTE — ASSESSMENT & PLAN NOTE
Discharge instructions reviewed with pt/family, discussed medications, VU, denies any further questions or anxiety related to discharge. IV/tele discontinued. Pt discharged to private residence. Taken from room via wheelchair by writer, personal belongings taken with.   Valentina Russell RN We will start her on Sertraline 50 mg. We will have her take 0.5 tablet every evening for 1 week then increase to 1 tablet every evening and continue the Wellbutrin. Reviewed concept of depression as biochemical imbalance of neurotransmitters and rationale for treatment. Instructed patient to contact office or on-call physician promptly should condition worsen or any new symptoms appear.

## 2024-12-12 NOTE — ASSESSMENT & PLAN NOTE
We will start her on Sertraline 50 mg. We will have her take 0.5 tablet every evening for 1 week then increase to 1 tablet every evening and continue the Wellbutrin.  The risks and benefits of my recommendations, as well as other treatment options were discussed with the patient today.    The side effects of the medications were discussed.  Advised not to take the medication with alcohol .  Exercise regularly and this can give you a sense of well being and help decrease feelings of anxiety.;  Get plenty of sleep. Sleep rests your brain as well as your body, and can improve your general sense of wellbeing as well as your mood.;  Avoid alcohol and drug abuse. It may seem that alcohol or drugs relax you. But in the long run they make anxiety worse and cause more problems.;  Avoid caffeine. Caffeine is found in coffee, tea, soft drinks and chocolate. Caffeine may increase your sense of anxiety because it stimulates your nervous system. Also avoid over-the-counter diet pills, and cough and cold medicines that contain a decongestant.;  Confront the things that have made you anxious in the past. Begin by just picturing yourself confronting these things. By doing this, you can get used to the idea of confronting the things that make you anxious before you actually do it. After you feel more comfortable picturing yourself confronting these things, you can begin to actually face them.;  If you feel yourself getting anxious, practice a relaxation technique or focus on a simple task, such as counting backward from 100 to 0.;  Although feelings of anxiety are scary, they won't hurt you. Label the level of your fear from 0 to 10 and keep track as it goes up and down. Notice that it doesn't stay at a very high level for more than a few seconds. When the fear comes, accept it. Wait and give it time to pass without running away from it.;  Take medications as prescribed.

## 2024-12-12 NOTE — PROGRESS NOTES
"Subjective   Patient ID: Lakeshia Freedman is a 54 y.o. female who presents for Follow-up (Anxiety and Depression).    Anxiety  Presents for follow-up visit. Symptoms include depressed mood, excessive worry, insomnia, muscle tension, nervous/anxious behavior, obsessions and panic. Patient reports no chest pain, compulsions, confusion, decreased concentration, dizziness, dry mouth, feeling of choking, hyperventilation, impotence, irritability, malaise, nausea, palpitations, restlessness, shortness of breath or suicidal ideas. Symptoms occur occasionally. The most recent episode lasted 5 hours. The severity of symptoms is mild. The patient sleeps 5 hours per night. The quality of sleep is poor. Nighttime awakenings: several.     Compliance with medications is %.        Review of Systems   Constitutional:  Negative for chills, fever and irritability.   HENT:  Negative for congestion, ear pain, nosebleeds, rhinorrhea and sore throat.    Respiratory:  Negative for cough, shortness of breath and wheezing.    Cardiovascular:  Negative for chest pain, palpitations and leg swelling.   Gastrointestinal:  Negative for abdominal pain, constipation, diarrhea, nausea and vomiting.   Genitourinary:  Negative for dysuria, frequency, hematuria and impotence.   Neurological:  Negative for dizziness, tremors, numbness and headaches.   Psychiatric/Behavioral:  Negative for confusion, decreased concentration and suicidal ideas. The patient is nervous/anxious and has insomnia.        Objective   /62   Pulse 74   Temp 36.3 °C (97.3 °F)   Resp 14   Ht 1.6 m (5' 3\")   Wt 68 kg (150 lb)   SpO2 97%   BMI 26.57 kg/m²     Physical Exam  Constitutional:       General: She is not in acute distress.     Appearance: Normal appearance.   HENT:      Head: Normocephalic and atraumatic.      Mouth/Throat:      Mouth: Mucous membranes are moist.      Pharynx: Oropharynx is clear. No oropharyngeal exudate or posterior oropharyngeal " erythema.   Eyes:      General: No scleral icterus.     Extraocular Movements: Extraocular movements intact.      Pupils: Pupils are equal, round, and reactive to light.   Cardiovascular:      Rate and Rhythm: Normal rate and regular rhythm.      Pulses: Normal pulses.      Heart sounds: No murmur heard.     No friction rub. No gallop.   Pulmonary:      Effort: Pulmonary effort is normal.      Breath sounds: No wheezing, rhonchi or rales.   Skin:     General: Skin is warm.      Coloration: Skin is not jaundiced or pale.      Findings: No erythema or rash.   Neurological:      General: No focal deficit present.      Mental Status: She is alert and oriented to person, place, and time.      Cranial Nerves: No cranial nerve deficit.      Sensory: No sensory deficit.      Coordination: Coordination normal.      Gait: Gait normal.         Assessment/Plan   Problem List Items Addressed This Visit       Generalized anxiety disorder - Primary     We will start her on Sertraline 50 mg. We will have her take 0.5 tablet every evening for 1 week then increase to 1 tablet every evening and continue the Wellbutrin.  The risks and benefits of my recommendations, as well as other treatment options were discussed with the patient today.    The side effects of the medications were discussed.  Advised not to take the medication with alcohol .  Exercise regularly and this can give you a sense of well being and help decrease feelings of anxiety.;  Get plenty of sleep. Sleep rests your brain as well as your body, and can improve your general sense of wellbeing as well as your mood.;  Avoid alcohol and drug abuse. It may seem that alcohol or drugs relax you. But in the long run they make anxiety worse and cause more problems.;  Avoid caffeine. Caffeine is found in coffee, tea, soft drinks and chocolate. Caffeine may increase your sense of anxiety because it stimulates your nervous system. Also avoid over-the-counter diet pills, and cough  and cold medicines that contain a decongestant.;  Confront the things that have made you anxious in the past. Begin by just picturing yourself confronting these things. By doing this, you can get used to the idea of confronting the things that make you anxious before you actually do it. After you feel more comfortable picturing yourself confronting these things, you can begin to actually face them.;  If you feel yourself getting anxious, practice a relaxation technique or focus on a simple task, such as counting backward from 100 to 0.;  Although feelings of anxiety are scary, they won't hurt you. Label the level of your fear from 0 to 10 and keep track as it goes up and down. Notice that it doesn't stay at a very high level for more than a few seconds. When the fear comes, accept it. Wait and give it time to pass without running away from it.;  Take medications as prescribed.         Relevant Medications    sertraline (Zoloft) 50 mg tablet    buPROPion XL (Wellbutrin XL) 150 mg 24 hr tablet    ALPRAZolam (Xanax) 1 mg tablet    Other Relevant Orders    Follow Up In [facility] - PCP - Established    Herpes labialis     We will have her take Valtrex 2 tablets twice a day for 1 day when she gets a cold sore. The risks and benefits of my recommendations, as well as other treatment options were discussed with the patient today.          Relevant Medications    valACYclovir (Valtrex) 1 gram tablet    Moderate episode of recurrent major depressive disorder     We will start her on Sertraline 50 mg. We will have her take 0.5 tablet every evening for 1 week then increase to 1 tablet every evening and continue the Wellbutrin. Reviewed concept of depression as biochemical imbalance of neurotransmitters and rationale for treatment. Instructed patient to contact office or on-call physician promptly should condition worsen or any new symptoms appear.         Relevant Medications    sertraline (Zoloft) 50 mg tablet     buPROPion XL (Wellbutrin XL) 150 mg 24 hr tablet    Other Relevant Orders    Follow Up [facility] - PCP - Established     Other Visit Diagnoses       Encounter for screening mammogram for breast cancer        Relevant Orders    BI mammo bilateral screening tomosynthesis          Scribe Attestation  By signing my name below, I, Priyank Reyes   attest that this documentation has been prepared under the direction and in the presence of Scott Mckeon MD.

## 2024-12-12 NOTE — ASSESSMENT & PLAN NOTE
We will have her take Valtrex 2 tablets twice a day for 1 day when she gets a cold sore. The risks and benefits of my recommendations, as well as other treatment options were discussed with the patient today.

## 2024-12-19 ENCOUNTER — TELEPHONE (OUTPATIENT)
Dept: PRIMARY CARE | Facility: CLINIC | Age: 54
End: 2024-12-19

## 2024-12-19 DIAGNOSIS — F41.1 GENERALIZED ANXIETY DISORDER: ICD-10-CM

## 2024-12-19 DIAGNOSIS — F33.1 MODERATE EPISODE OF RECURRENT MAJOR DEPRESSIVE DISORDER: ICD-10-CM

## 2024-12-19 RX ORDER — ESCITALOPRAM OXALATE 10 MG/1
10 TABLET ORAL DAILY
Qty: 90 TABLET | Refills: 1 | OUTPATIENT
Start: 2024-12-19

## 2025-01-03 DIAGNOSIS — B00.1 HERPES LABIALIS: ICD-10-CM

## 2025-01-03 RX ORDER — VALACYCLOVIR HYDROCHLORIDE 1 G/1
1000 TABLET, FILM COATED ORAL DAILY
Qty: 90 TABLET | Refills: 1 | OUTPATIENT
Start: 2025-01-03

## 2025-01-03 NOTE — TELEPHONE ENCOUNTER
Rx Refill Request     Name: Lakeshia Freedman  :  1970   Medication Name:  valACYclovir (Valtrex) 1 gram tablet   Specific Pharmacy location:  [facility]/pharmacy [address] [place] [address],   Date of last appointment:  2024   Date of next appointment:  3/6/2025   Best number to reach patient:  [phone]       Pharmacy is asking for a 90 day supply- 30 day supply sent in on 2024. They have pended the rx if you believe it is appropriate.

## 2025-01-06 ENCOUNTER — APPOINTMENT (OUTPATIENT)
Dept: RADIOLOGY | Facility: HOSPITAL | Age: 55
End: 2025-01-06
Payer: COMMERCIAL

## 2025-01-22 ENCOUNTER — HOSPITAL ENCOUNTER (OUTPATIENT)
Dept: WOMENS IMAGING | Age: 55
Discharge: HOME | End: 2025-01-24
Payer: COMMERCIAL

## 2025-01-22 VITALS — HEIGHT: 63 IN | BODY MASS INDEX: 31.72 KG/M2

## 2025-01-22 DIAGNOSIS — Z12.31 ENCOUNTER FOR SCREENING MAMMOGRAM FOR MALIGNANT NEOPLASM OF BREAST: ICD-10-CM

## 2025-01-22 PROCEDURE — 77063 BREAST TOMOSYNTHESIS BI: CPT

## 2025-03-06 ENCOUNTER — APPOINTMENT (OUTPATIENT)
Dept: PRIMARY CARE | Facility: CLINIC | Age: 55
End: 2025-03-06
Payer: COMMERCIAL

## 2025-03-06 VITALS
OXYGEN SATURATION: 97 % | SYSTOLIC BLOOD PRESSURE: 102 MMHG | WEIGHT: 150 LBS | DIASTOLIC BLOOD PRESSURE: 60 MMHG | HEART RATE: 69 BPM | BODY MASS INDEX: 26.58 KG/M2 | RESPIRATION RATE: 16 BRPM | TEMPERATURE: 97.5 F | HEIGHT: 63 IN

## 2025-03-06 DIAGNOSIS — F33.1 MODERATE EPISODE OF RECURRENT MAJOR DEPRESSIVE DISORDER: ICD-10-CM

## 2025-03-06 DIAGNOSIS — J20.9 ACUTE BRONCHITIS, UNSPECIFIED ORGANISM: ICD-10-CM

## 2025-03-06 DIAGNOSIS — F41.1 GENERALIZED ANXIETY DISORDER: Primary | ICD-10-CM

## 2025-03-06 PROCEDURE — 99213 OFFICE O/P EST LOW 20 MIN: CPT | Performed by: FAMILY MEDICINE

## 2025-03-06 PROCEDURE — 3008F BODY MASS INDEX DOCD: CPT | Performed by: FAMILY MEDICINE

## 2025-03-06 RX ORDER — PROMETHAZINE HYDROCHLORIDE AND DEXTROMETHORPHAN HYDROBROMIDE 6.25; 15 MG/5ML; MG/5ML
5 SYRUP ORAL EVERY 6 HOURS PRN
Qty: 180 ML | Refills: 0 | Status: CANCELLED | OUTPATIENT
Start: 2025-03-06

## 2025-03-06 RX ORDER — BUSPIRONE HYDROCHLORIDE 5 MG/1
5 TABLET ORAL 2 TIMES DAILY
Qty: 60 TABLET | Refills: 3 | Status: SHIPPED | OUTPATIENT
Start: 2025-03-06

## 2025-03-06 RX ORDER — AZITHROMYCIN 250 MG/1
TABLET, FILM COATED ORAL
Qty: 6 TABLET | Refills: 0 | Status: SHIPPED | OUTPATIENT
Start: 2025-03-06 | End: 2025-03-11

## 2025-03-06 RX ORDER — BUPROPION HYDROCHLORIDE 300 MG/1
300 TABLET ORAL EVERY MORNING
Qty: 30 TABLET | Refills: 3 | Status: SHIPPED | OUTPATIENT
Start: 2025-03-06

## 2025-03-06 RX ORDER — LISDEXAMFETAMINE DIMESYLATE 30 MG/1
CAPSULE ORAL
COMMUNITY
Start: 2025-03-05

## 2025-03-06 RX ORDER — ALPRAZOLAM 1 MG/1
1 TABLET ORAL DAILY PRN
Qty: 30 TABLET | Refills: 0 | Status: SHIPPED | OUTPATIENT
Start: 2025-03-06

## 2025-03-06 RX ORDER — BROMPHENIRAMINE MALEATE, PSEUDOEPHEDRINE HYDROCHLORIDE, AND DEXTROMETHORPHAN HYDROBROMIDE 2; 30; 10 MG/5ML; MG/5ML; MG/5ML
10 SYRUP ORAL EVERY 6 HOURS PRN
Qty: 200 ML | Refills: 0 | Status: SHIPPED | OUTPATIENT
Start: 2025-03-06

## 2025-03-06 RX ORDER — SERTRALINE HYDROCHLORIDE 50 MG/1
50 TABLET, FILM COATED ORAL DAILY
Qty: 30 TABLET | Refills: 3 | Status: SHIPPED | OUTPATIENT
Start: 2025-03-06

## 2025-03-06 ASSESSMENT — ENCOUNTER SYMPTOMS
CHILLS: 0
PANIC: 0
SHORTNESS OF BREATH: 0
FEVER: 0
DEPRESSED MOOD: 0
HEMOPTYSIS: 0
RESTLESSNESS: 0
COUGH: 1
WHEEZING: 0
NERVOUS/ANXIOUS: 1
IRRITABILITY: 1

## 2025-03-06 ASSESSMENT — ANXIETY QUESTIONNAIRES
IF YOU CHECKED OFF ANY PROBLEMS ON THIS QUESTIONNAIRE, HOW DIFFICULT HAVE THESE PROBLEMS MADE IT FOR YOU TO DO YOUR WORK, TAKE CARE OF THINGS AT HOME, OR GET ALONG WITH OTHER PEOPLE: NOT DIFFICULT AT ALL
7. FEELING AFRAID AS IF SOMETHING AWFUL MIGHT HAPPEN: SEVERAL DAYS
5. BEING SO RESTLESS THAT IT IS HARD TO SIT STILL: SEVERAL DAYS
GAD7 TOTAL SCORE: 15
3. WORRYING TOO MUCH ABOUT DIFFERENT THINGS: NEARLY EVERY DAY
2. NOT BEING ABLE TO STOP OR CONTROL WORRYING: NEARLY EVERY DAY
6. BECOMING EASILY ANNOYED OR IRRITABLE: MORE THAN HALF THE DAYS
4. TROUBLE RELAXING: MORE THAN HALF THE DAYS
1. FEELING NERVOUS, ANXIOUS, OR ON EDGE: NEARLY EVERY DAY

## 2025-03-06 NOTE — ASSESSMENT & PLAN NOTE
We will increase the Wellbutrin XL to 300 mg daily and start her on Buspar 5 mg twice a day. The risks and benefits of my recommendations, as well as other treatment options were discussed with the patient today.    The side effects of the medications were discussed.  Advised not to take the medication with alcohol .  Exercise regularly and this can give you a sense of well being and help decrease feelings of anxiety.;  Get plenty of sleep. Sleep rests your brain as well as your body, and can improve your general sense of wellbeing as well as your mood.;  Avoid alcohol and drug abuse. It may seem that alcohol or drugs relax you. But in the long run they make anxiety worse and cause more problems.;  Avoid caffeine. Caffeine is found in coffee, tea, soft drinks and chocolate. Caffeine may increase your sense of anxiety because it stimulates your nervous system. Also avoid over-the-counter diet pills, and cough and cold medicines that contain a decongestant.;  Confront the things that have made you anxious in the past. Begin by just picturing yourself confronting these things. By doing this, you can get used to the idea of confronting the things that make you anxious before you actually do it. After you feel more comfortable picturing yourself confronting these things, you can begin to actually face them.;  If you feel yourself getting anxious, practice a relaxation technique or focus on a simple task, such as counting backward from 100 to 0.;  Although feelings of anxiety are scary, they won't hurt you. Label the level of your fear from 0 to 10 and keep track as it goes up and down. Notice that it doesn't stay at a very high level for more than a few seconds. When the fear comes, accept it. Wait and give it time to pass without running away from it.;  Take medications as prescribed.

## 2025-03-06 NOTE — PROGRESS NOTES
"Subjective   Patient ID: Lakeshia Freedman is a 55 y.o. female who presents for Follow-up (Anxiety, and Depression) and Cough.    Anxiety  Presents for follow-up visit. Symptoms include excessive worry, irritability and nervous/anxious behavior. Patient reports no chest pain, depressed mood, dizziness, palpitations, panic, restlessness, shortness of breath or suicidal ideas. Symptoms occur constantly. The severity of symptoms is moderate. The patient sleeps 6 hours per night. The quality of sleep is poor. Nighttime awakenings: several.     Compliance with medications is 51-75%.   Cough  This is a new problem. Episode onset: 5 days ago. The problem has been unchanged. Associated symptoms include nasal congestion. Pertinent negatives include no chest pain, chills, ear pain, fever, headaches, hemoptysis, rhinorrhea, sore throat, shortness of breath or wheezing. Nothing aggravates the symptoms.        Review of Systems   Constitutional:  Positive for irritability. Negative for chills and fever.   HENT:  Negative for congestion, ear pain, nosebleeds, rhinorrhea and sore throat.    Respiratory:  Positive for cough. Negative for hemoptysis, shortness of breath and wheezing.    Cardiovascular:  Negative for chest pain, palpitations and leg swelling.   Gastrointestinal:  Negative for abdominal pain, constipation, diarrhea and vomiting.   Genitourinary:  Negative for dysuria, frequency and hematuria.   Neurological:  Negative for dizziness, tremors, numbness and headaches.   Psychiatric/Behavioral:  Negative for suicidal ideas. The patient is nervous/anxious.        Objective   /60 (BP Location: Left arm, Patient Position: Sitting)   Pulse 69   Temp 36.4 °C (97.5 °F)   Resp 16   Ht 1.6 m (5' 3\")   Wt 68 kg (150 lb)   SpO2 97%   BMI 26.57 kg/m²     Physical Exam  Constitutional:       General: She is not in acute distress.     Appearance: Normal appearance.   HENT:      Head: Normocephalic and atraumatic.      " Mouth/Throat:      Mouth: Mucous membranes are moist.      Pharynx: Oropharynx is clear. No oropharyngeal exudate or posterior oropharyngeal erythema.   Eyes:      General: No scleral icterus.     Extraocular Movements: Extraocular movements intact.      Pupils: Pupils are equal, round, and reactive to light.   Cardiovascular:      Rate and Rhythm: Normal rate and regular rhythm.      Pulses: Normal pulses.      Heart sounds: No murmur heard.     No friction rub. No gallop.   Pulmonary:      Effort: Pulmonary effort is normal.      Breath sounds: No wheezing, rhonchi or rales.   Skin:     General: Skin is warm.      Coloration: Skin is not jaundiced or pale.      Findings: No erythema or rash.   Neurological:      General: No focal deficit present.      Mental Status: She is alert and oriented to person, place, and time.      Cranial Nerves: No cranial nerve deficit.      Sensory: No sensory deficit.      Coordination: Coordination normal.      Gait: Gait normal.         Assessment/Plan   Problem List Items Addressed This Visit       Generalized anxiety disorder - Primary     We will increase the Wellbutrin XL to 300 mg daily and start her on Buspar 5 mg twice a day. The risks and benefits of my recommendations, as well as other treatment options were discussed with the patient today.    The side effects of the medications were discussed.  Advised not to take the medication with alcohol .  Exercise regularly and this can give you a sense of well being and help decrease feelings of anxiety.;  Get plenty of sleep. Sleep rests your brain as well as your body, and can improve your general sense of wellbeing as well as your mood.;  Avoid alcohol and drug abuse. It may seem that alcohol or drugs relax you. But in the long run they make anxiety worse and cause more problems.;  Avoid caffeine. Caffeine is found in coffee, tea, soft drinks and chocolate. Caffeine may increase your sense of anxiety because it stimulates your  nervous system. Also avoid over-the-counter diet pills, and cough and cold medicines that contain a decongestant.;  Confront the things that have made you anxious in the past. Begin by just picturing yourself confronting these things. By doing this, you can get used to the idea of confronting the things that make you anxious before you actually do it. After you feel more comfortable picturing yourself confronting these things, you can begin to actually face them.;  If you feel yourself getting anxious, practice a relaxation technique or focus on a simple task, such as counting backward from 100 to 0.;  Although feelings of anxiety are scary, they won't hurt you. Label the level of your fear from 0 to 10 and keep track as it goes up and down. Notice that it doesn't stay at a very high level for more than a few seconds. When the fear comes, accept it. Wait and give it time to pass without running away from it.;  Take medications as prescribed.         Relevant Medications    ALPRAZolam (Xanax) 1 mg tablet    buPROPion XL (Wellbutrin XL) 300 mg 24 hr tablet    busPIRone (Buspar) 5 mg tablet    sertraline (Zoloft) 50 mg tablet    Other Relevant Orders    Follow Up [facility] - PCP - Established    Moderate episode of recurrent major depressive disorder     We will increase the Wellbutrin XL to 300 mg daily. Reviewed concept of depression as biochemical imbalance of neurotransmitters and rationale for treatment. Instructed patient to contact office or on-call physician promptly should condition worsen or any new symptoms appear          Relevant Medications    buPROPion XL (Wellbutrin XL) 300 mg 24 hr tablet    sertraline (Zoloft) 50 mg tablet    Other Relevant Orders    Follow Up [facility] - PCP - Established    Acute bronchitis, unspecified     We will start her on Azithromycin and Bromfed as needed.  Recommend liberal oral fluid intake.  Call if symptoms fail to improve as expected.     Follow-up if persistent or worsening symptoms otherwise when necessary.         Relevant Medications    azithromycin (Zithromax) 250 mg tablet    brompheniramine-pseudoeph-DM 2-30-10 mg/5 mL syrup     OARRS:  Scott Mckeon MD on 3/6/2025  3:37 PM  I have personally reviewed the OARRS report for Lakeshia Freedman. I have considered the risks of abuse, dependence, addiction and diversion    Is the patient prescribed a combination of a benzodiazepine and opioid?  No    Last Urine Drug Screen / ordered today: No  Recent Results (from the past 8760 hours)   POCT waived urine drug screen manually resulted    Collection Time: 05/16/24  4:00 PM   Result Value Ref Range    POC THC Negative Negative, Not applicable ng/mL    POC Cocaine Negative Negative, Not applicable ng/mL    POC Opiates Negative Negative, Not applicable ng/mL    POC Amphetamine Negative Negative, Not applicable ng/mL    POC Phencyclidine (PCP) Negative Negative, Not applicable ng/mL    POC Barbiturates Negative Negative, Not applicable ng/mL    POC Benzodiazepines Negative Negative, Not applicable ng/mL    POC Methamphetamine Negative Negative, Not applicable ng/mL    POC METHADONE MANUALLY ENTERED Negative Negative, Not applicable ng/mL    POC Ticyclic Antidepressants (TCA) Negative Negative, Not applicable ng/mL    POC Oxycodone Negative Negative, Not applicable ng/mL    POC MDMA URINE Negative Negative, Not applicable ng/mL    POC Morphine Urine Negative Negative, Not applicable ng/mL    POC Burprenorphine Urine Negative Negative, Not applicable ng/mL     Controlled Substance Agreement:  Date of the Last Agreement: 5/16/24  Reviewed Controlled Substance Agreement including but not limited to the benefits, risks, and alternatives to treatment with a Controlled Substance medication(s).    Benzodiazepines:  What is the patient's goal of therapy? Able to function without being unduly anxious and carry out day-to-day activities without tension and panic  attacks    Is this being achieved with current treatment? yes    MARLEY-7:  No data recorded  Activities of Daily Living:   Is your overall impression that this patient is benefiting (symptom reduction outweighs side effects) from benzodiazepine therapy? Yes     1. Physical Functioning: Better  2. Family Relationship: Better  3. Social Relationship: Better  4. Mood: Better  5. Sleep Patterns: Better  6. Overall Function: Better    Scribe Attestation  By signing my name below, IJovi Scribe   attest that this documentation has been prepared under the direction and in the presence of Scott Mckeon MD.

## 2025-03-06 NOTE — ASSESSMENT & PLAN NOTE
We will increase the Wellbutrin XL to 300 mg daily. Reviewed concept of depression as biochemical imbalance of neurotransmitters and rationale for treatment. Instructed patient to contact office or on-call physician promptly should condition worsen or any new symptoms appear

## 2025-03-06 NOTE — ASSESSMENT & PLAN NOTE
We will start her on Azithromycin and Bromfed as needed.  Recommend liberal oral fluid intake.  Call if symptoms fail to improve as expected.    Follow-up if persistent or worsening symptoms otherwise when necessary.

## 2025-03-08 ASSESSMENT — ENCOUNTER SYMPTOMS
ABDOMINAL PAIN: 0
DYSURIA: 0
DIZZINESS: 0
RHINORRHEA: 0
HEADACHES: 0
FREQUENCY: 0
NUMBNESS: 0
TREMORS: 0
SORE THROAT: 0
HEMATURIA: 0
CONSTIPATION: 0
DIARRHEA: 0
PALPITATIONS: 0
VOMITING: 0

## 2025-06-12 ENCOUNTER — APPOINTMENT (OUTPATIENT)
Dept: PRIMARY CARE | Facility: CLINIC | Age: 55
End: 2025-06-12
Payer: COMMERCIAL

## 2025-06-12 VITALS
OXYGEN SATURATION: 99 % | HEIGHT: 63 IN | WEIGHT: 145 LBS | TEMPERATURE: 97.5 F | DIASTOLIC BLOOD PRESSURE: 72 MMHG | HEART RATE: 90 BPM | SYSTOLIC BLOOD PRESSURE: 104 MMHG | RESPIRATION RATE: 20 BRPM | BODY MASS INDEX: 25.69 KG/M2

## 2025-06-12 DIAGNOSIS — F33.1 MODERATE EPISODE OF RECURRENT MAJOR DEPRESSIVE DISORDER: ICD-10-CM

## 2025-06-12 DIAGNOSIS — Z00.00 HEALTHCARE MAINTENANCE: Primary | ICD-10-CM

## 2025-06-12 DIAGNOSIS — F41.1 GENERALIZED ANXIETY DISORDER: ICD-10-CM

## 2025-06-12 DIAGNOSIS — Z79.899 MEDICATION MANAGEMENT: ICD-10-CM

## 2025-06-12 LAB
POC AMPHETAMINE: POSITIVE NG/ML
POC BARBITURATES: NEGATIVE NG/ML
POC BENZODIAZEPINES: POSITIVE NG/ML
POC BUPRENORPHINE URINE: NEGATIVE NG/ML
POC COCAINE: NEGATIVE NG/ML
POC MDMA (NG/ML) IN URINE: NEGATIVE NG/ML
POC METHADONE MANUALLY ENTERED: NEGATIVE NG/ML
POC METHAMPHETAMINE: NEGATIVE NG/ML
POC MORPHINE URINE: NEGATIVE NG/ML
POC OPIATES: NEGATIVE NG/ML
POC OXYCODONE: NEGATIVE NG/ML
POC PHENCYCLIDINE (PCP): NEGATIVE NG/ML
POC THC: NEGATIVE NG/ML
POC TICYCLIC ANTIDEPRESSANTS (TCA): NEGATIVE NG/ML

## 2025-06-12 PROCEDURE — 80305 DRUG TEST PRSMV DIR OPT OBS: CPT | Performed by: FAMILY MEDICINE

## 2025-06-12 PROCEDURE — 3008F BODY MASS INDEX DOCD: CPT | Performed by: FAMILY MEDICINE

## 2025-06-12 PROCEDURE — 99396 PREV VISIT EST AGE 40-64: CPT | Performed by: FAMILY MEDICINE

## 2025-06-12 RX ORDER — TIZANIDINE HYDROCHLORIDE 4 MG/1
4 CAPSULE, GELATIN COATED ORAL 3 TIMES DAILY
COMMUNITY

## 2025-06-12 RX ORDER — ARIPIPRAZOLE 5 MG/1
5 TABLET ORAL DAILY
Qty: 30 TABLET | Refills: 2 | Status: SHIPPED | OUTPATIENT
Start: 2025-06-12

## 2025-06-12 RX ORDER — BUPROPION HYDROCHLORIDE 300 MG/1
300 TABLET ORAL EVERY MORNING
Qty: 30 TABLET | Refills: 3 | Status: SHIPPED | OUTPATIENT
Start: 2025-06-12

## 2025-06-12 RX ORDER — ALPRAZOLAM 1 MG/1
1 TABLET ORAL DAILY PRN
Qty: 30 TABLET | Refills: 0 | Status: SHIPPED | OUTPATIENT
Start: 2025-06-12

## 2025-06-12 ASSESSMENT — ENCOUNTER SYMPTOMS
THOUGHT CONTENT - OBSESSIONS: 0
MUSCLE TENSION: 0
COMPULSIONS: 0
IRRITABILITY: 1
RESTLESSNESS: 1
CONFUSION: 0
SHORTNESS OF BREATH: 0
DEPRESSED MOOD: 0
DECREASED CONCENTRATION: 1
DIZZINESS: 0
PALPITATIONS: 0
NERVOUS/ANXIOUS: 1
HYPERVENTILATION: 0
NAUSEA: 0
PANIC: 0
MALAISE: 0
FEELING OF CHOKING: 0
INSOMNIA: 1

## 2025-06-12 ASSESSMENT — ANXIETY QUESTIONNAIRES
IF YOU CHECKED OFF ANY PROBLEMS ON THIS QUESTIONNAIRE, HOW DIFFICULT HAVE THESE PROBLEMS MADE IT FOR YOU TO DO YOUR WORK, TAKE CARE OF THINGS AT HOME, OR GET ALONG WITH OTHER PEOPLE: VERY DIFFICULT
GAD7 TOTAL SCORE: 21
6. BECOMING EASILY ANNOYED OR IRRITABLE: NEARLY EVERY DAY
7. FEELING AFRAID AS IF SOMETHING AWFUL MIGHT HAPPEN: NEARLY EVERY DAY
3. WORRYING TOO MUCH ABOUT DIFFERENT THINGS: NEARLY EVERY DAY
2. NOT BEING ABLE TO STOP OR CONTROL WORRYING: NEARLY EVERY DAY
1. FEELING NERVOUS, ANXIOUS, OR ON EDGE: NEARLY EVERY DAY
4. TROUBLE RELAXING: NEARLY EVERY DAY
5. BEING SO RESTLESS THAT IT IS HARD TO SIT STILL: NEARLY EVERY DAY

## 2025-06-12 ASSESSMENT — PATIENT HEALTH QUESTIONNAIRE - PHQ9
1. LITTLE INTEREST OR PLEASURE IN DOING THINGS: NOT AT ALL
2. FEELING DOWN, DEPRESSED OR HOPELESS: NOT AT ALL
SUM OF ALL RESPONSES TO PHQ9 QUESTIONS 1 AND 2: 0

## 2025-06-12 NOTE — ASSESSMENT & PLAN NOTE
Reviewed concept of depression as biochemical imbalance of neurotransmitters and rationale for treatment. Instructed patient to contact office or on-call physician promptly should condition worsen or any new symptoms appear

## 2025-06-12 NOTE — ASSESSMENT & PLAN NOTE
We will wean her off the Buspar by having her take 1 tablet daily for 1 week then 1 tablet every other day for 1 week then stop. We will have her Start Abilify in 1 week and have her take 0.5 tablet daily for 1 week then increase to 1 tablet daily and continue the Wellbutrin.  The risks and benefits of my recommendations, as well as other treatment options were discussed with the patient today.    The side effects of the medications were discussed.  Advised not to take the medication with alcohol .  Exercise regularly and this can give you a sense of well being and help decrease feelings of anxiety.;  Get plenty of sleep. Sleep rests your brain as well as your body, and can improve your general sense of wellbeing as well as your mood.;  Avoid alcohol and drug abuse. It may seem that alcohol or drugs relax you. But in the long run they make anxiety worse and cause more problems.;  Avoid caffeine. Caffeine is found in coffee, tea, soft drinks and chocolate. Caffeine may increase your sense of anxiety because it stimulates your nervous system. Also avoid over-the-counter diet pills, and cough and cold medicines that contain a decongestant..

## 2025-06-12 NOTE — PROGRESS NOTES
"Subjective   Patient ID: Lakeshia Freedman is a 55 y.o. female who presents for Annual Exam and Follow-up (Anxiety, and Depression).    Patient presents today for annual physical exam.    Anxiety  Presents for follow-up visit. Symptoms include decreased concentration, excessive worry, insomnia, irritability, nervous/anxious behavior and restlessness. Patient reports no chest pain, compulsions, confusion, depressed mood, dizziness, dry mouth, feeling of choking, hyperventilation, impotence, malaise, muscle tension, nausea, obsessions, palpitations, panic, shortness of breath or suicidal ideas. Symptoms occur constantly. The quality of sleep is poor.     Compliance with medications is %.      Review of Systems   Constitutional:  Positive for irritability. Negative for chills and fever.   HENT:  Negative for congestion, ear pain, nosebleeds, rhinorrhea and sore throat.    Respiratory:  Negative for cough, shortness of breath and wheezing.    Cardiovascular:  Negative for chest pain, palpitations and leg swelling.   Gastrointestinal:  Negative for abdominal pain, constipation, diarrhea, nausea and vomiting.   Endocrine: Negative for cold intolerance, heat intolerance, polydipsia, polyphagia and polyuria.   Genitourinary:  Negative for dysuria, frequency, hematuria and impotence.   Neurological:  Negative for dizziness, tremors, numbness and headaches.   Hematological:  Negative for adenopathy. Does not bruise/bleed easily.   Psychiatric/Behavioral:  Positive for decreased concentration. Negative for confusion and suicidal ideas. The patient is nervous/anxious and has insomnia.        Objective   /72   Pulse 90   Temp 36.4 °C (97.5 °F)   Resp 20   Ht 1.6 m (5' 3\")   Wt 65.8 kg (145 lb)   SpO2 99%   BMI 25.69 kg/m²     Physical Exam  Constitutional:       General: She is not in acute distress.     Appearance: Normal appearance.   HENT:      Head: Normocephalic and atraumatic.      Mouth/Throat:      Mouth: " Mucous membranes are moist.      Pharynx: Oropharynx is clear. No oropharyngeal exudate or posterior oropharyngeal erythema.   Eyes:      General: No scleral icterus.     Extraocular Movements: Extraocular movements intact.      Pupils: Pupils are equal, round, and reactive to light.   Cardiovascular:      Rate and Rhythm: Normal rate and regular rhythm.      Pulses: Normal pulses.      Heart sounds: No murmur heard.     No friction rub. No gallop.   Pulmonary:      Effort: Pulmonary effort is normal.      Breath sounds: No wheezing, rhonchi or rales.   Musculoskeletal:      Right lower leg: No edema.      Left lower leg: No edema.   Skin:     General: Skin is warm.      Coloration: Skin is not jaundiced or pale.      Findings: No erythema or rash.   Neurological:      General: No focal deficit present.      Mental Status: She is alert and oriented to person, place, and time.      Cranial Nerves: No cranial nerve deficit.      Sensory: No sensory deficit.      Coordination: Coordination normal.      Gait: Gait normal.         Assessment/Plan   Problem List Items Addressed This Visit       Generalized anxiety disorder    We will wean her off the Buspar by having her take 1 tablet daily for 1 week then 1 tablet every other day for 1 week then stop. We will have her Start Abilify in 1 week and have her take 0.5 tablet daily for 1 week then increase to 1 tablet daily and continue the Wellbutrin.  The risks and benefits of my recommendations, as well as other treatment options were discussed with the patient today.    The side effects of the medications were discussed.  Advised not to take the medication with alcohol .  Exercise regularly and this can give you a sense of well being and help decrease feelings of anxiety.;  Get plenty of sleep. Sleep rests your brain as well as your body, and can improve your general sense of wellbeing as well as your mood.;  Avoid alcohol and drug abuse. It may seem that alcohol or drugs  relax you. But in the long run they make anxiety worse and cause more problems.;  Avoid caffeine. Caffeine is found in coffee, tea, soft drinks and chocolate. Caffeine may increase your sense of anxiety because it stimulates your nervous system. Also avoid over-the-counter diet pills, and cough and cold medicines that contain a decongestant..         Relevant Medications    ARIPiprazole (Abilify) 5 mg tablet    ALPRAZolam (Xanax) 1 mg tablet    buPROPion XL (Wellbutrin XL) 300 mg 24 hr tablet    Other Relevant Orders    POCT waived urine drug screen manually resulted (Completed)    Follow Up In [facility] - PCP    Healthcare maintenance - Primary    Recommend low-cholesterol diet, low-fat diet and low-salt diet.  The need for lifelong dietary compliance in order to reduce cardiac risk is recommended.  We will also recommend regular exercise program to improve lipid balance and overall health.  Recommend decreasing fat and cholesterol in diet, increasing aerobic exercise with a goal of 4 or more days per week         Relevant Orders    CBC and Auto Differential    Comprehensive Metabolic Panel    Lipid Panel    Moderate episode of recurrent major depressive disorder    Reviewed concept of depression as biochemical imbalance of neurotransmitters and rationale for treatment. Instructed patient to contact office or on-call physician promptly should condition worsen or any new symptoms appear          Relevant Medications    ARIPiprazole (Abilify) 5 mg tablet    buPROPion XL (Wellbutrin XL) 300 mg 24 hr tablet    Other Relevant Orders    Follow Up In [facility] - PCP     Other Visit Diagnoses         Medication management        Relevant Orders    POCT waived urine drug screen manually resulted (Completed)          [facility]:  Scott Mckeon MD on 6/12/2025  4:39 PM  I have personally reviewed the [facility] report for Lakeshia Freedman. I have considered the risks of abuse, dependence, addiction and  diversion    Is the patient prescribed a combination of a benzodiazepine and opioid?  No    Last Urine Drug Screen / ordered today: Yes  Recent Results (from the past 8760 hours)   POCT waived urine drug screen manually resulted    Collection Time: 06/12/25  4:06 PM   Result Value Ref Range    POC THC Negative Negative, Not applicable ng/mL    POC Cocaine Negative Negative, Not applicable ng/mL    POC Opiates Negative Negative, Not applicable ng/mL    POC Amphetamine Positive (A) Negative, Not applicable ng/mL    POC Phencyclidine (PCP) Negative Negative, Not applicable ng/mL    POC Barbiturates Negative Negative, Not applicable ng/mL    POC Benzodiazepines Positive (A) Negative, Not applicable ng/mL    POC Methamphetamine Negative Negative, Not applicable ng/mL    POC METHADONE MANUALLY ENTERED Negative Negative, Not applicable ng/mL    POC Ticyclic Antidepressants (TCA) Negative Negative, Not applicable ng/mL    POC Oxycodone Negative Negative, Not applicable ng/mL    POC MDMA URINE Negative Negative, Not applicable ng/mL    POC Morphine Urine Negative Negative, Not applicable ng/mL    POC Burprenorphine Urine Negative Negative, Not applicable ng/mL     Controlled Substance Agreement:  Date of the Last Agreement: 6/12/2025  Reviewed Controlled Substance Agreement including but not limited to the benefits, risks, and alternatives to treatment with a Controlled Substance medication(s).    Benzodiazepines:  What is the patient's goal of therapy? Able to function without being unduly anxious and carry out day-to-day activities without tension and panic attacks    Is this being achieved with current treatment? yes    MARLEY-7:  Over the last 2 weeks, how often have you been bothered by any of the following problems?  Feeling nervous, anxious, or on edge: 3  Not being able to stop or control worrying: 3  Worrying too much about different things: 3  Trouble relaxing: 3  Being so restless that it is hard to sit still:  3  Becoming easily annoyed or irritable: 3  Feeling afraid as if something awful might happen: 3  MARLEY-7 Total Score: 21    Activities of Daily Living:   Is your overall impression that this patient is benefiting (symptom reduction outweighs side effects) from benzodiazepine therapy? Yes     1. Physical Functioning: Better  2. Family Relationship: Better  3. Social Relationship: Better  4. Mood: Better  5. Sleep Patterns: Better  6. Overall Function: Better    Scribe Attestation  By signing my name below, I, Priyank Reyes   attest that this documentation has been prepared under the direction and in the presence of Scott Mckeon MD.

## 2025-06-14 ASSESSMENT — ENCOUNTER SYMPTOMS
POLYPHAGIA: 0
NUMBNESS: 0
ABDOMINAL PAIN: 0
BRUISES/BLEEDS EASILY: 0
RHINORRHEA: 0
ADENOPATHY: 0
DIARRHEA: 0
HEADACHES: 0
COUGH: 0
HEMATURIA: 0
DYSURIA: 0
FEVER: 0
VOMITING: 0
FREQUENCY: 0
CHILLS: 0
SORE THROAT: 0
CONSTIPATION: 0
WHEEZING: 0
TREMORS: 0
POLYDIPSIA: 0

## 2025-06-20 LAB
ALBUMIN SERPL-MCNC: 4.7 G/DL (ref 3.6–5.1)
ALP SERPL-CCNC: 55 U/L (ref 37–153)
ALT SERPL-CCNC: 18 U/L (ref 6–29)
ANION GAP SERPL CALCULATED.4IONS-SCNC: 6 MMOL/L (CALC) (ref 7–17)
AST SERPL-CCNC: 18 U/L (ref 10–35)
BASOPHILS # BLD AUTO: 48 CELLS/UL (ref 0–200)
BASOPHILS NFR BLD AUTO: 1.1 %
BILIRUB SERPL-MCNC: 0.5 MG/DL (ref 0.2–1.2)
BUN SERPL-MCNC: 20 MG/DL (ref 7–25)
CALCIUM SERPL-MCNC: 9.7 MG/DL (ref 8.6–10.4)
CHLORIDE SERPL-SCNC: 105 MMOL/L (ref 98–110)
CHOLEST SERPL-MCNC: 175 MG/DL
CHOLEST/HDLC SERPL: 2.6 (CALC)
CO2 SERPL-SCNC: 28 MMOL/L (ref 20–32)
CREAT SERPL-MCNC: 0.84 MG/DL (ref 0.5–1.03)
EGFRCR SERPLBLD CKD-EPI 2021: 83 ML/MIN/1.73M2
EOSINOPHIL # BLD AUTO: 224 CELLS/UL (ref 15–500)
EOSINOPHIL NFR BLD AUTO: 5.1 %
ERYTHROCYTE [DISTWIDTH] IN BLOOD BY AUTOMATED COUNT: 12.7 % (ref 11–15)
GLUCOSE SERPL-MCNC: 103 MG/DL (ref 65–99)
HCT VFR BLD AUTO: 42.9 % (ref 35–45)
HDLC SERPL-MCNC: 67 MG/DL
HGB BLD-MCNC: 13.9 G/DL (ref 11.7–15.5)
LDLC SERPL CALC-MCNC: 91 MG/DL (CALC)
LYMPHOCYTES # BLD AUTO: 1588 CELLS/UL (ref 850–3900)
LYMPHOCYTES NFR BLD AUTO: 36.1 %
MCH RBC QN AUTO: 31.1 PG (ref 27–33)
MCHC RBC AUTO-ENTMCNC: 32.4 G/DL (ref 32–36)
MCV RBC AUTO: 96 FL (ref 80–100)
MONOCYTES # BLD AUTO: 365 CELLS/UL (ref 200–950)
MONOCYTES NFR BLD AUTO: 8.3 %
NEUTROPHILS # BLD AUTO: 2174 CELLS/UL (ref 1500–7800)
NEUTROPHILS NFR BLD AUTO: 49.4 %
NONHDLC SERPL-MCNC: 108 MG/DL (CALC)
PLATELET # BLD AUTO: 275 THOUSAND/UL (ref 140–400)
PMV BLD REES-ECKER: 10.1 FL (ref 7.5–12.5)
POTASSIUM SERPL-SCNC: 4.5 MMOL/L (ref 3.5–5.3)
PROT SERPL-MCNC: 7.3 G/DL (ref 6.1–8.1)
RBC # BLD AUTO: 4.47 MILLION/UL (ref 3.8–5.1)
SODIUM SERPL-SCNC: 139 MMOL/L (ref 135–146)
TRIGL SERPL-MCNC: 76 MG/DL
WBC # BLD AUTO: 4.4 THOUSAND/UL (ref 3.8–10.8)

## 2025-07-05 DIAGNOSIS — F41.1 GENERALIZED ANXIETY DISORDER: ICD-10-CM

## 2025-07-05 DIAGNOSIS — F33.1 MODERATE EPISODE OF RECURRENT MAJOR DEPRESSIVE DISORDER: ICD-10-CM

## 2025-07-06 RX ORDER — ARIPIPRAZOLE 5 MG/1
5 TABLET ORAL DAILY
Qty: 90 TABLET | Refills: 0 | Status: SHIPPED | OUTPATIENT
Start: 2025-07-06

## 2025-09-18 ENCOUNTER — APPOINTMENT (OUTPATIENT)
Dept: PRIMARY CARE | Facility: CLINIC | Age: 55
End: 2025-09-18
Payer: COMMERCIAL

## (undated) DEVICE — HYPODERMIC SAFETY NEEDLE: Brand: MAGELLAN

## (undated) DEVICE — PAD BD FOAM 33X72X2.75 IN BLU EGGCRATE

## (undated) DEVICE — SUTURE VCRL SZ 4-0 L18IN ABSRB UD L19MM PS-2 3/8 CIR PRIM J496H

## (undated) DEVICE — NEPTUNE E-SEP SMOKE EVACUATION PENCIL, COATED, 70MM BLADE, PUSH BUTTON SWITCH: Brand: NEPTUNE E-SEP

## (undated) DEVICE — TUBING, SUCTION, 1/4" X 10', STRAIGHT: Brand: MEDLINE

## (undated) DEVICE — GOWN,AURORA,NONRNF,XL,30/CS: Brand: MEDLINE

## (undated) DEVICE — LABEL MED MINI W/ MARKER

## (undated) DEVICE — INTENDED FOR TISSUE SEPARATION, AND OTHER PROCEDURES THAT REQUIRE A SHARP SURGICAL BLADE TO PUNCTURE OR CUT.: Brand: BARD-PARKER ® CARBON RIB-BACK BLADES

## (undated) DEVICE — SET,IRRIGATION,CYSTO/TUR: Brand: MEDLINE

## (undated) DEVICE — AGENT HEMSTAT 3GM OXIDIZED REGENERATED CELOS ABSRB FOR CONT (ORDER MULTIPLES OF 5EA)

## (undated) DEVICE — SHEARS ENDOSCP L36CM DIA5MM ULTRASONIC CRV TIP W/ ADV

## (undated) DEVICE — ADHESIVE SKIN CLSR 0.7ML TOP DERMBND ADV

## (undated) DEVICE — GOWN,AURORA,NONREINFORCED,LARGE: Brand: MEDLINE

## (undated) DEVICE — APPLICATOR MEDICATED 26 CC SOLUTION HI LT ORNG CHLORAPREP

## (undated) DEVICE — PACK,SET UP,DRAPE: Brand: MEDLINE

## (undated) DEVICE — Device

## (undated) DEVICE — TIP IU L8CM DIA6.7MM BLU SIL FLX DISP RUMI II

## (undated) DEVICE — SYRINGE MED 10ML LUERLOCK TIP W/O SFTY DISP

## (undated) DEVICE — SURGICEL ENDOSCP APPL

## (undated) DEVICE — KIT,ANTI FOG,W/SPONGE & FLUID,SOFT PACK: Brand: MEDLINE

## (undated) DEVICE — GAUZE,SPONGE,4"X4",16PLY,XRAY,STRL,LF: Brand: MEDLINE

## (undated) DEVICE — TIP IU L6CM DIA5.1MM LAV SIL SFT FLX DST END DISP RUMI II

## (undated) DEVICE — POUCH, INSTRUMENT, 3POCKET, INVISISHIELD: Brand: MEDLINE

## (undated) DEVICE — TROCAR: Brand: KII FIOS FIRST ENTRY

## (undated) DEVICE — COVER,TABLE,44X90,STERILE: Brand: MEDLINE

## (undated) DEVICE — TRAY PREP DRY W/ PREM GLV 2 APPL 6 SPNG 2 UNDPD 1 OVERWRAP

## (undated) DEVICE — SEALER ENDOSCP NANO COAT OPN DIV CRV L JAW LIGASURE IMPACT

## (undated) DEVICE — YANKAUER,BULB TIP,W/O VENT,RIGID,STERILE: Brand: MEDLINE

## (undated) DEVICE — COVER LT HNDL BLU PLAS

## (undated) DEVICE — TROCAR: Brand: KII SLEEVE

## (undated) DEVICE — TOTAL TRAY, DB, 100% SILI FOLEY, 16FR 10: Brand: MEDLINE

## (undated) DEVICE — WARMER SCP 2 ANTIFOG LAP DISP

## (undated) DEVICE — SUTURE VCRL SZ 3-0 L27IN ABSRB UD L26MM SH 1/2 CIR J416H

## (undated) DEVICE — DRAPE, LAVH, STERILE: Brand: MEDLINE

## (undated) DEVICE — LITHOTOMY DRAPE WITH FLUID CONTROL POUCH: Brand: CONVERTORS

## (undated) DEVICE — 4-PORT MANIFOLD: Brand: NEPTUNE 2

## (undated) DEVICE — Z DUPLICATE USE 2431315 SET INSUF TBNG HI FLO W/ SMK EVAC FOR PNEUMOCLEAR

## (undated) DEVICE — ELECTRODE PT RET AD L9FT HI MOIST COND ADH HYDRGEL CORDED

## (undated) DEVICE — MARKER SURG SKIN GENTIAN VLT REG TIP W/ 6IN RUL

## (undated) DEVICE — TOWEL,OR,DSP,ST,BLUE,STD,4/PK,20PK/CS: Brand: MEDLINE

## (undated) DEVICE — COUNTER NDL 40 COUNT HLD 70 FOAM BLK ADH W/ MAG

## (undated) DEVICE — GLOVE ORANGE PI 7 1/2   MSG9075

## (undated) DEVICE — LINER PAD CONTOUR SUPER PEACH 7X14IN

## (undated) DEVICE — GLOVE ORANGE PI 8   MSG9080

## (undated) DEVICE — YANKAUER,SMOOTH HANDLE,HIGH CAPACITY: Brand: MEDLINE INDUSTRIES, INC.

## (undated) DEVICE — SUTURE VCRL SZ 0 L36IN ABSRB UD L36MM CT-1 1/2 CIR J946H